# Patient Record
Sex: FEMALE | Race: WHITE | Employment: OTHER | URBAN - METROPOLITAN AREA
[De-identification: names, ages, dates, MRNs, and addresses within clinical notes are randomized per-mention and may not be internally consistent; named-entity substitution may affect disease eponyms.]

---

## 2017-01-20 ENCOUNTER — HOSPITAL ENCOUNTER (OUTPATIENT)
Dept: MAMMOGRAPHY | Age: 67
Discharge: HOME | End: 2017-01-20
Attending: FAMILY MEDICINE
Payer: COMMERCIAL

## 2017-01-20 DIAGNOSIS — Z12.31 VISIT FOR SCREENING MAMMOGRAM: ICD-10-CM

## 2017-01-20 PROCEDURE — 77067 SCR MAMMO BI INCL CAD: CPT

## 2017-04-06 PROBLEM — Z79.4 TYPE 2 DIABETES MELLITUS WITHOUT COMPLICATION, WITH LONG-TERM CURRENT USE OF INSULIN (HCC): Status: ACTIVE | Noted: 2017-04-06

## 2017-04-06 PROBLEM — E11.9 TYPE 2 DIABETES MELLITUS WITHOUT COMPLICATION, WITH LONG-TERM CURRENT USE OF INSULIN (HCC): Status: ACTIVE | Noted: 2017-04-06

## 2018-02-21 ENCOUNTER — HOSPITAL ENCOUNTER (OUTPATIENT)
Dept: MAMMOGRAPHY | Age: 68
Discharge: HOME | End: 2018-02-21
Attending: FAMILY MEDICINE
Payer: COMMERCIAL

## 2018-02-21 DIAGNOSIS — Z12.31 VISIT FOR SCREENING MAMMOGRAM: ICD-10-CM

## 2018-02-21 PROCEDURE — 77067 SCR MAMMO BI INCL CAD: CPT

## 2018-03-12 ENCOUNTER — HOSPITAL ENCOUNTER (OUTPATIENT)
Dept: PHYSICAL THERAPY | Age: 68
Discharge: HOME | End: 2018-03-12
Payer: COMMERCIAL

## 2018-03-12 PROCEDURE — G8991 OTHER PT/OT GOAL STATUS: HCPCS

## 2018-03-12 PROCEDURE — G8990 OTHER PT/OT CURRENT STATUS: HCPCS

## 2018-03-12 PROCEDURE — 97140 MANUAL THERAPY 1/> REGIONS: CPT

## 2018-03-12 PROCEDURE — 97166 OT EVAL MOD COMPLEX 45 MIN: CPT

## 2018-03-12 NOTE — PROGRESS NOTES
Ambulatory/Rehab Services H2 Model Falls Risk Assessment    Risk Factor Pts. ·   Confusion/Disorientation/Impulsivity  []    4 ·   Symptomatic Depression  []   2 ·   Altered Elimination  []   1 ·   Dizziness/Vertigo  []   1 ·   Gender (Male)  []   1 ·   Any administered antiepileptics (anticonvulsants):  []   2 ·   Any administered benzodiazepines:  []   1 ·   Visual Impairment (specify):  []   1 ·   Portable Oxygen Use  []   1 ·   Orthostatic ? BP  []   1 ·   History of Recent Falls (within 3 mos.)  []   5     Ability to Rise from Chair (choose one) Pts. ·   Ability to rise in a single movement  []   0 ·   Pushes up, successful in one attempt  [x]   1 ·   Multiple attempts, but successful  []   3 ·   Unable to rise without assistance  []   4   Total: (5 or greater = High Risk) 1     Falls Prevention Plan:   []                Physical Limitations to Exercise (specify):   []                Mobility Assistance Device (type):   []                Exercise/Equipment Adaptation (specify):    ©2010 [facility] Patent #[id number].  Federal Law prohibits the replication, distribution or use without written permission from [facility] [employer]

## 2018-03-12 NOTE — THERAPY EVALUATION
[facility]  : 1950  Primary: [facility]  Secondary: [address] [facility]  [address], [facility], [address]  [phone]   [phone]         OUTPATIENT OCCUPATIONAL THERAPY: Initial Assessment and Daily Note 3/12/2018    ICD-10: Treatment Diagnosis: I89.0, lymphedema not elsewhere classified  G89.0, pain not elsewhere classified  M62.81, muscle weakness generalized  Precautions/Allergies:   Codeine and Tramadol   Fall Risk Score: 1 (? 5 = High Risk)  MD Orders: eval and treat MEDICAL/REFERRING DIAGNOSIS:   Localized edema [R60.0]   DATE OF ONSET:    REFERRING PHYSICIAN: Ankur Flores MD  RETURN PHYSICIAN APPOINTMENT: to be determined      INITIAL ASSESSMENT:  Ms. Hayley Garcia presents with bilateral lower extremity 3+/5 pitting edema from her knees to her feet. She has history of THR in . She reports her swelling started in  and is not reversible. She has no tissue changes or history of cellulitis. 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 She is wearing 15-23 mmHg compression zipper socks daily at work. She reports tightness, limb heaviness, and pain. Pt is also diabetic with neuropathy in her hands and feet. She lives with her  and two adult children. She is here to manage her leg swelling. PLAN OF CARE:   PROBLEM LIST:  1. Edema/Girth  2. Decreased Knowledge of Precautions  3. Decreased Skin Integrity/Hygeine  4. Decreased [place] with Home Exercise Program INTERVENTIONS PLANNED  1. Skin care  2. Compression bandaging  3. Fitting for compression garment(s)  4. Manual therapy/Manual lymph drainage  5. Therapeutic exercise/Therapeutic activities  6. Patient Education  7. Compression pump trial prn  8.  kinesiotaping    TREATMENT PLAN:  Effective Dates: 3/12/18 TO 18.   Frequency/Duration:  2x a week for 90 days and upon reassessment will adjust frequency and duration as progress indicates. GOALS: (Goals have been discussed and agreed upon with patient.)  Short-Term Functional Goals: Time Frame: 45 days  1. The patient/caregiver will verbalize understanding of lymphedema precautions. 2. Patient will be independent with skin care regimen to decrease risk of cellulitis. 3. The patient/caregiver will be independent at donning and doffing  lower extremity compression bandages. 4. The patient/caregiver will be independent with self-manual lymph drainage techniques and show decrease in limb volume. 5. Patient will be independent in lymphatic exercises. Discharge Goals: Time Frame: 90 days  1. Patient's bilateral lower extremity circumferential measurements will decrease 5 to 10 cm on volumetric graph to maximize functional use in ADL's.    2. The patient/caregiver will be independent with home management of lymphedema. 3. Patient/caregiver will be independent donning and doffing bilateral lower extremity compression garment. Rehabilitation Potential For Stated Goals: Fair  Regarding Shabbir Huang's therapy, I certify that the treatment plan above will be carried out by a therapist or under their direction. Thank you for this referral,  Louis Carl OTR/L     Referring Physician Signature: Susie Scales MD _________________________  Date _________            The information in this section was collected on 3/12/2018   (except where otherwise noted). OCCUPATIONAL PROFILE & HISTORY:   History of Present Injury/Illness (Reason for Referral):    Ms. Ignacio Lucero presents with bilateral lower extremity 3+/5 pitting edema from her knees to her feet. She has history of THR in 2015. She reports her swelling started in 2017 and is not reversible. She has no tissue changes or history of cellulitis. 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   She is wearing 15-23 mmHg compression zipper socks daily at work. She reports tightness, limb heaviness, and pain. Pt is also diabetic with neuropathy in her hands and feet. She lives with her  and two adult children. She is here to manage her leg swelling. Past Medical History/Comorbidities:   Ms. Isidoro Light  has a past medical history of Chronic musculoskeletal pain; Cystocele with rectocele; Essential hypertension (2/9/2016); GERD (gastroesophageal reflux disease); Hip fracture requiring operative repair ([facility] (1/9/2015); Hip fracture, left ([facility] (1/7/2015); Hyperlipidemia; Hypothyroidism; Impaired mobility and ADLs; Pure hypercholesterolemia (2/9/2016); and Type 2 diabetes mellitus without complication ([facility] (1/8/2438). Ms. Isidoro Light  has a past surgical history that includes hx tonsillectomy; hx tubal ligation; hx dilation and curettage; hx colonoscopy; hx magali and bso; and hx hip fracture tx (Left). Social History/Living Environment:        Pt lives with her  and 2 adult children  Prior Level of Function/Work/Activity:  Pt works full time in a school [employer]. She is independent with ADLs/IADLs with difficulty donning and doffing socks and shoes due to s/p THR and limited hip flexion    Current Medications:    Current Outpatient Prescriptions:     TURMERIC, BULK,, by Does Not Apply route., Disp: , Rfl:     HUMALOG KWIKPEN 100 unit/mL kwikpen, INJECT 40  UNITS SUBCUTANEOUSLY AT BEDTIME, Disp: 15 Pen, Rfl: 11    ezetimibe (ZETIA) 10 mg tablet, Take 1 Tab by mouth daily. , Disp: 90 Tab, Rfl: 3    gabapentin (NEURONTIN) 600 mg tablet, TAKE ONE TABLET BY MOUTH THREE TIMES DAILY, Disp: 270 Tab, Rfl: 3    cyclobenzaprine (FLEXERIL) 10 mg tablet, Take 1 Tab by mouth daily. , Disp: 90 Tab, Rfl: 3    traMADol (ULTRAM) 50 mg tablet, TAKE ONE TABLET BY MOUTH TWICE DAILY AS NEEDED FOR 30 DAYS, Disp: 60 Tab, Rfl: 5    BD INSULIN PEN NEEDLE UF SHORT 31 gauge x 5/16\" ndle, USE  TWICE DAILY AS DIRECTED WITH  INSULIN  PENS, Disp: 100 Pen Needle, Rfl: 17    TOUJEO SOLOSTAR 300 unit/mL (1.5 mL) inpn, INJECT 70  UNITS SUBCUTANEOUSLY ONCE DAILY WITH DINNER, Disp: 18 Pen, Rfl: 11    glyBURIDE-metFORMIN (GLUCOVANCE) 5-500 mg per tablet, Take 1 Tab by mouth Daily (before breakfast). Indications: type 2 diabetes mellitus, Disp: 90 Tab, Rfl: 3    atorvastatin (LIPITOR) 80 mg tablet, Take 1 Tab by mouth daily. Indications: hyperlipidemia, Disp: 90 Tab, Rfl: 3    oxybutynin chloride XL (DITROPAN XL) 10 mg CR tablet, Take 1 Tab by mouth daily. , Disp: 30 Tab, Rfl: 11    meloxicam (MOBIC) 15 mg tablet, Take 1 Tab by mouth daily. , Disp: 30 Tab, Rfl: 11    cyanocobalamin 1,000 mcg tablet, Take 1,000 mcg by mouth daily. , Disp: , Rfl:     levothyroxine (SYNTHROID) 100 mcg tablet, TAKE ONE TABLET BY MOUTH ONCE DAILY BEFORE BREAKFAST FOR HYPOTHYROIDISM, Disp: 90 Tab, Rfl: 3    ONETOUCH ULTRA TEST strip, USE TO TEST BLOOD SUGARS THREE TIMES A DAY, Disp: 300 Strip, Rfl: 11    ONETOUCH ULTRA TEST strip, TEST BLOOD SUGAR TWICE A   DAY, Disp: 200 Strip, Rfl: 2    CALCIUM CARB/VIT D3/MINERALS (CALCIUM-VITAMIN D PO), Take  by mouth., Disp: , Rfl:     MULTIVITAMIN PO, Take  by mouth., Disp: , Rfl:             Date Last Reviewed:  3/12/2018     Complexity Level : Expanded review of therapy/medical records (1-2):  MODERATE COMPLEXITY   ASSESSMENT OF OCCUPATIONAL PERFORMANCE:   Palpation:          Pitting 3+/5 lower extremity edema from knees to feet bilaterally  Skin Integrity:          Excessive dryness, otherwise no tissue changes  Sensation:  Neuropathy in feet  Functional Mobility:  Ambulates without assistive device; independent  Activities of Daily Living Mod A shoes and socks due to limited hip flexion; has sock aid and long shoe horn at home  Edema/Girth:  3+ and pitting    Right Left    Initial Most Recent Initial Most Recent   Lower  Extremity 137 cm   139 cm        PRETREATMENT AFFECTED LIMB(s): right lower extremity  left lower extremity      Date:  3/12/18 3/12/18 Right / Left Right Left         Groin   []      []           8 inches   []      []           4 inches   []      []         PoplitealSpace   []      [] 32.0 cm 33.0 cm         8 inches   []      [] 33.0 33.5         4 inches   []      [] 29.5 30.0         Ankle   []      [] 24.0 24.5         Instep   []      [] 18.5 18.0       Measurements are taken in centimeters:  2.54 cm = 1 inch                Physical Skills Involved:  1. Sensation  2. Pain (Chronic)  3. Edema  4. Skin Integrity  5. tightness/heaviness Cognitive Skills Affected (resulting in the inability to perform in a timely and safe manner):  1. N/A Psychosocial Skills Affected:  1. Habits/Routines   Number of elements that affect the Plan of Care: 3-5:  MODERATE COMPLEXITY   CLINICAL DECISION MAKING:   Outcome Measure: Tool Used: Lymphedema Life Impact Scale   Score:  Initial: 14 Most Recent: X (Date: -- )   Interpretation of Score: The Lymphedema Life Impact Scale (LLIS) is a validated instrument that measures the physical, functional, and psychosocial concerns pertinent to patients with extremity lymphedema. The Scale's questionnaire is administered to patients to gauge impairments, activity limitations, and participation restrictions resulting from their lymphedema. Score 0 1-13 14-26 27-40 41-54 55-67 68   Modifier CH CI CJ CK CL CM CN     ? Other PT/OT Primary Functional Limitations:     - CURRENT STATUS: CJ - 20%-39% impaired, limited or restricted    - GOAL STATUS: CI - 1%-19% impaired, limited or restricted    - D/C STATUS:  ---------------To be determined---------------       Medical Necessity:   · Patient is expected to demonstrate progress in reduction of edema to reduce risk for cellulitis. Reason for Services/Other Comments:  · Patient continues to require skilled intervention due to bilateral lower extremity lymphedema.      Use of outcome tool(s) and clinical judgement create a POC that gives a: Questionable prediction of patient's progress: MODERATE COMPLEXITY   TREATMENT:   (In addition to Assessment/Re-Assessment sessions the following treatments were rendered)    Pre-treatment Symptoms/Complaints:  bilateral lower extremity 3+/5 pitting edema from her knees to her feet  Pain: Initial:   Pain Intensity 1: 2  Post Session:  2   Occupational Therapy Treatments:    OT eval(X  ) OT eval was completed. Pt received information on lymphedema and risk reduction/self management practices as outlined by the National Lymphedema Network. Therapeutic Exercise (minutes):     HEP:  As above; handouts given to patient for all exercises. Manual Therapy:  30 minutes    Pt was educated on lymphatic pathophysiology, complete decongestive therapy, precautions and skin integrity management. Manual Lymph Drainage:( minutes)           Lymph Nodes:    Cervical Supraclavicular Axillary Abdominal Inguinal Popliteal Antecubital   RIGHT []     []     []     []     []     []     []       LEFT []     []     []     []     []     []     []          Anastamoses:   Axillo-axillary Inguino-inguinal Axillo-inguinal Inguino-axillary   ANTERIOR []     []     []     []       POSTERIOR []     []     []     []       RIGHT []     []     []     []       LEFT []     []     []     []         Limbs:   []    RUE     []    LUE     []    RLE    []    LLE   Compression: ( minutes): Applied surgigrip stockinette for light compression. Pt will begin MLD and compression bandaging next treatment session    Treatment/Session Assessment:    · Response to Treatment:  Pt in agreement with POC and goals. · Compliance with Program/Exercises: Will assess as treatment progresses. · Recommendations/Intent for next treatment session: \"Next visit will focus on complete decongestive therapy\".   Total Treatment Duration:  OT Patient Time In/Time Out  Time In: 0400  Time Out: [address], OT

## 2018-03-26 ENCOUNTER — HOSPITAL ENCOUNTER (OUTPATIENT)
Dept: PHYSICAL THERAPY | Age: 68
Discharge: HOME | End: 2018-03-26
Payer: COMMERCIAL

## 2018-03-26 PROCEDURE — 97140 MANUAL THERAPY 1/> REGIONS: CPT

## 2018-03-26 NOTE — THERAPY EVALUATION
Alley Nunez  : 1950  Primary: [employer]  Secondary: [address] at [facility] Therapy  [address], [facility], [address]  [phone]   [phone]         OUTPATIENT OCCUPATIONAL THERAPY: Daily Note 3/26/2018    ICD-10: Treatment Diagnosis: I89.0, lymphedema not elsewhere classified  G89.0, pain not elsewhere classified  M62.81, muscle weakness generalized  Precautions/Allergies:   Codeine and Tramadol   Fall Risk Score: 1 (? 5 = High Risk)  MD Orders: eval and treat MEDICAL/REFERRING DIAGNOSIS:   Localized edema [R60.0]   DATE OF ONSET:    REFERRING PHYSICIAN: Jono Harper MD  RETURN PHYSICIAN APPOINTMENT: to be determined      INITIAL ASSESSMENT:  Ms. John Marley presents with bilateral lower extremity 3+/5 pitting edema from her knees to her feet. She has history of THR in . She reports her swelling started in  and is not reversible. She has no tissue changes or history of cellulitis. 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 She is wearing 15-23 mmHg compression zipper socks daily at work. She reports tightness, limb heaviness, and pain. Pt is also diabetic with neuropathy in her hands and feet. She lives with her  and two adult children. She is here to manage her leg swelling. PLAN OF CARE:   PROBLEM LIST:  1. Edema/Girth  2. Decreased Knowledge of Precautions  3. Decreased Skin Integrity/Hygeine  4. Decreased [place] with Home Exercise Program INTERVENTIONS PLANNED  1. Skin care  2. Compression bandaging  3. Fitting for compression garment(s)  4. Manual therapy/Manual lymph drainage  5. Therapeutic exercise/Therapeutic activities  6. Patient Education  7. Compression pump trial prn  8.  kinesiotaping    TREATMENT PLAN:  Effective Dates: 3/12/18 TO 18.   Frequency/Duration:  2x a week for 90 days and upon reassessment will adjust frequency and duration as progress indicates. GOALS: (Goals have been discussed and agreed upon with patient.)  Short-Term Functional Goals: Time Frame: 45 days  1. The patient/caregiver will verbalize understanding of lymphedema precautions. 2. Patient will be independent with skin care regimen to decrease risk of cellulitis. 3. The patient/caregiver will be independent at donning and doffing  lower extremity compression bandages. 4. The patient/caregiver will be independent with self-manual lymph drainage techniques and show decrease in limb volume. 5. Patient will be independent in lymphatic exercises. Discharge Goals: Time Frame: 90 days  1. Patient's bilateral lower extremity circumferential measurements will decrease 5 to 10 cm on volumetric graph to maximize functional use in ADL's.    2. The patient/caregiver will be independent with home management of lymphedema. 3. Patient/caregiver will be independent donning and doffing bilateral lower extremity compression garment. Rehabilitation Potential For Stated Goals: Fair  Regarding Michael Huang's therapy, I certify that the treatment plan above will be carried out by a therapist or under their direction. Thank you for this referral,  Bijan Blanchard OTR/L     Referring Physician Signature: Anahi Delgado MD _________________________  Date _________            The information in this section was collected on 3/26/2018   (except where otherwise noted). OCCUPATIONAL PROFILE & HISTORY:   History of Present Injury/Illness (Reason for Referral):    Ms. Chula Trejo presents with bilateral lower extremity 3+/5 pitting edema from her knees to her feet. She has history of THR in 2015. She reports her swelling started in 2017 and is not reversible. She has no tissue changes or history of cellulitis. 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 She is wearing 15-23 mmHg compression zipper socks daily at work. She reports tightness, limb heaviness, and pain. Pt is also diabetic with neuropathy in her hands and feet. She lives with her  and two adult children. She is here to manage her leg swelling. Past Medical History/Comorbidities:   Ms. Chula Trejo  has a past medical history of Chronic musculoskeletal pain; Cystocele with rectocele; Essential hypertension (2/9/2016); GERD (gastroesophageal reflux disease); Hip fracture requiring operative repair ([facility] (1/9/2015); [facility] fracture[facility] ([facility] (1/7/2015); Hyperlipidemia; Hypothyroidism; Impaired mobility and ADLs; Pure hypercholesterolemia (2/9/2016); and Type 2 diabetes mellitus without complication ([facility] (4/2/4547). Ms. Chula Trejo  has a past surgical history that includes hx tonsillectomy; hx tubal ligation; hx dilation and curettage; hx colonoscopy; hx magali and bso; and hx hip fracture tx (Left). Social History/Living Environment:        Pt lives with her  and 2 adult children  Prior Level of Function/Work/Activity:  Pt works full time in a school [employer]. She is independent with ADLs/IADLs with difficulty donning and doffing socks and shoes due to s/p THR and limited hip flexion    Current Medications:    Current Outpatient Prescriptions:     TURMERIC, BULK,, by Does Not Apply route., Disp: , Rfl:     HUMALOG KWIKPEN 100 unit/mL kwikpen, INJECT 40  UNITS SUBCUTANEOUSLY AT BEDTIME, Disp: 15 Pen, Rfl: 11    ezetimibe (ZETIA) 10 mg tablet, Take 1 Tab by mouth daily. , Disp: 90 Tab, Rfl: 3    gabapentin (NEURONTIN) 600 mg tablet, TAKE ONE TABLET BY MOUTH THREE TIMES DAILY, Disp: 270 Tab, Rfl: 3    cyclobenzaprine (FLEXERIL) 10 mg tablet, Take 1 Tab by mouth daily. , Disp: 90 Tab, Rfl: 3    traMADol (ULTRAM) 50 mg tablet, TAKE ONE TABLET BY MOUTH TWICE DAILY AS NEEDED FOR 30 DAYS, Disp: 60 Tab, Rfl: 5    BD INSULIN PEN NEEDLE UF SHORT 31 gauge x 5/16\" ndle, USE  TWICE DAILY AS DIRECTED WITH  INSULIN  PENS, Disp: 100 Pen Needle, Rfl: 17    TOUJEO SOLOSTAR 300 unit/mL (1.5 mL) inpn, INJECT 70  UNITS SUBCUTANEOUSLY ONCE DAILY WITH DINNER, Disp: 18 Pen, Rfl: 11    glyBURIDE-metFORMIN (GLUCOVANCE) 5-500 mg per tablet, Take 1 Tab by mouth Daily (before breakfast). Indications: type 2 diabetes mellitus, Disp: 90 Tab, Rfl: 3    atorvastatin (LIPITOR) 80 mg tablet, Take 1 Tab by mouth daily. Indications: hyperlipidemia, Disp: 90 Tab, Rfl: 3    oxybutynin chloride XL (DITROPAN XL) 10 mg CR tablet, Take 1 Tab by mouth daily. , Disp: 30 Tab, Rfl: 11    meloxicam (MOBIC) 15 mg tablet, Take 1 Tab by mouth daily. , Disp: 30 Tab, Rfl: 11    cyanocobalamin 1,000 mcg tablet, Take 1,000 mcg by mouth daily. , Disp: , Rfl:     levothyroxine (SYNTHROID) 100 mcg tablet, TAKE ONE TABLET BY MOUTH ONCE DAILY BEFORE BREAKFAST FOR HYPOTHYROIDISM, Disp: 90 Tab, Rfl: 3    ONETOUCH ULTRA TEST strip, USE TO TEST BLOOD SUGARS THREE TIMES A DAY, Disp: 300 Strip, Rfl: 11    ONETOUCH ULTRA TEST strip, TEST BLOOD SUGAR TWICE A   DAY, Disp: 200 Strip, Rfl: 2    CALCIUM CARB/VIT D3/MINERALS (CALCIUM-VITAMIN D PO), Take  by mouth., Disp: , Rfl:     MULTIVITAMIN PO, Take  by mouth., Disp: , Rfl:             Date Last Reviewed:  3/26/2018     Complexity Level : Expanded review of therapy/medical records (1-2):  MODERATE COMPLEXITY   ASSESSMENT OF OCCUPATIONAL PERFORMANCE:   Palpation:          Pitting 3+/5 lower extremity edema from knees to feet bilaterally  Skin Integrity:          Excessive dryness, otherwise no tissue changes  Sensation:  Neuropathy in feet  Functional Mobility:  Ambulates without assistive device; independent  Activities of Daily Living Mod A shoes and socks due to limited hip flexion; has sock aid and long shoe horn at home  Edema/Girth:  3+ and pitting    Right Left    Initial Most Recent Initial Most Recent   Lower  Extremity 137 cm   139 cm        PRETREATMENT AFFECTED LIMB(s): right lower extremity  left lower extremity      Date:  3/12/18 3/12/18        Right / Left Right Left         Groin   []      []           8 inches   []      []           4 inches   []      []         PoplitealSpace   []      [] 32.0 cm 33.0 cm         8 inches   []      [] 33.0 33.5         4 inches   []      [] 29.5 30.0         Ankle   []      [] 24.0 24.5         Instep   []      [] 18.5 18.0       Measurements are taken in centimeters:  2.54 cm = 1 inch                Physical Skills Involved:  1. Sensation  2. Pain (Chronic)  3. Edema  4. Skin Integrity  5. tightness/heaviness Cognitive Skills Affected (resulting in the inability to perform in a timely and safe manner):  1. N/A Psychosocial Skills Affected:  1. Habits/Routines   Number of elements that affect the Plan of Care: 3-5:  MODERATE COMPLEXITY   CLINICAL DECISION MAKING:   Outcome Measure: Tool Used: Lymphedema Life Impact Scale   Score:  Initial: 14 Most Recent: X (Date: -- )   Interpretation of Score: The Lymphedema Life Impact Scale (LLIS) is a validated instrument that measures the physical, functional, and psychosocial concerns pertinent to patients with extremity lymphedema. The Scale's questionnaire is administered to patients to gauge impairments, activity limitations, and participation restrictions resulting from their lymphedema. Score 0 1-13 14-26 27-40 41-54 55-67 68   Modifier CH CI CJ CK CL CM CN     ? Other PT/OT Primary Functional Limitations:     - CURRENT STATUS: CJ - 20%-39% impaired, limited or restricted    - GOAL STATUS: CI - 1%-19% impaired, limited or restricted    - D/C STATUS:  ---------------To be determined---------------       Medical Necessity:   · Patient is expected to demonstrate progress in reduction of edema to reduce risk for cellulitis. Reason for Services/Other Comments:  · Patient continues to require skilled intervention due to bilateral lower extremity lymphedema.      Use of outcome tool(s) and clinical judgement create a POC that gives a: Questionable prediction of patient's progress: MODERATE COMPLEXITY   TREATMENT:   (In addition to Assessment/Re-Assessment sessions the following treatments were rendered)  3/26/2018      Pre-treatment Symptoms/Complaints:  bilateral lower extremity 3+/5 pitting edema from her knees to her feet  Pain: Initial:   Pain Intensity 1: 2  Post Session:  2   Occupational Therapy Treatments:    OT eval(X  ) OT eval was completed. Pt received information on lymphedema and risk reduction/self management practices as outlined by the National Lymphedema Network. Therapeutic Exercise (minutes):     HEP:  As above; handouts given to patient for all exercises. Manual Therapy:  45 minutes   MLD followed by skin care and compression bandaging L LE          Manual Lymph Drainage:( 35 minutes)           Lymph Nodes:    Cervical Supraclavicular Axillary Abdominal Inguinal Popliteal Antecubital   RIGHT [x]     [x]     [x]     []     [x]     [x]     []       LEFT [x]     [x]     [x]     []     [x]     [x]     []          Anastamoses:   Axillo-axillary Inguino-inguinal Axillo-inguinal Inguino-axillary   ANTERIOR []     []     []     [x]       POSTERIOR []     []     []     []       RIGHT []     []     []     [x]       LEFT []     []     []     [x]         Limbs:   []    RUE     []    LUE     [x]    RLE    [x]    LLE   Compression: ( minutes): Applied Eucerin lotion B LEs. Multilayer compression bandaging L LE using surgigrip and 2 short stretch bandages. Surgigrip R LE. Pt is working in  full time and is unable to tolerate bandaging both legs consecutively. She had good results with surgigrip light compression over the weekend. Will request prescription for Easter Ink as this will enable her to have short stretch compression bilaterally and work. Treatment/Session Assessment:    · Response to Treatment:  Pt in agreement with POC and goals. · Compliance with Program/Exercises: Will assess as treatment progresses.   · Recommendations/Intent for next treatment session: \"Next visit will focus on complete decongestive therapy\".   Total Treatment Duration:  45 minutes  OT Patient Time In/Time Out  Time In: 0400  Time Out: 550 Uriel Kimble, OT

## 2018-04-02 ENCOUNTER — HOSPITAL ENCOUNTER (OUTPATIENT)
Dept: PHYSICAL THERAPY | Age: 68
Discharge: HOME | End: 2018-04-02
Payer: COMMERCIAL

## 2018-04-02 PROCEDURE — 97140 MANUAL THERAPY 1/> REGIONS: CPT

## 2018-04-02 NOTE — THERAPY EVALUATION
Vasquez Riosus  : 1950  Primary: [facility]  Secondary: [address] [place] at [facility] Therapy  [address], [facility], [address]  [phone]   [phone]         OUTPATIENT OCCUPATIONAL THERAPY: Daily Note 2018    ICD-10: Treatment Diagnosis: I89.0, lymphedema not elsewhere classified  G89.0, pain not elsewhere classified  M62.81, muscle weakness generalized  Precautions/Allergies:   Codeine and Tramadol   Fall Risk Score: 1 (? 5 = High Risk)  MD Orders: eval and treat MEDICAL/REFERRING DIAGNOSIS:   Localized edema [R60.0]   DATE OF ONSET:    REFERRING PHYSICIAN: Irena Keith MD  RETURN PHYSICIAN APPOINTMENT: to be determined      INITIAL ASSESSMENT:  Ms. Dionicio Piper presents with bilateral lower extremity 3+/5 pitting edema from her knees to her feet. She has history of THR in . She reports her swelling started in  and is not reversible. She has no tissue changes or history of cellulitis. 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 She is wearing 15-23 mmHg compression zipper socks daily at work. She reports tightness, limb heaviness, and pain. Pt is also diabetic with neuropathy in her hands and feet. She lives with her  and two adult children. She is here to manage her leg swelling. PLAN OF CARE:   PROBLEM LIST:  1. Edema/Girth  2. Decreased Knowledge of Precautions  3. Decreased Skin Integrity/Hygeine  4. Decreased [place] with Home Exercise Program INTERVENTIONS PLANNED  1. Skin care  2. Compression bandaging  3. Fitting for compression garment(s)  4. Manual therapy/Manual lymph drainage  5. Therapeutic exercise/Therapeutic activities  6. Patient Education  7. Compression pump trial prn  8.  kinesiotaping    TREATMENT PLAN:  Effective Dates: 3/12/18 TO 18.   Frequency/Duration:  2x a week for 90 days and upon reassessment will adjust frequency and duration as progress indicates. GOALS: (Goals have been discussed and agreed upon with patient.)  Short-Term Functional Goals: Time Frame: 45 days  1. The patient/caregiver will verbalize understanding of lymphedema precautions. 2. Patient will be independent with skin care regimen to decrease risk of cellulitis. 3. The patient/caregiver will be independent at donning and doffing  lower extremity compression bandages. 4. The patient/caregiver will be independent with self-manual lymph drainage techniques and show decrease in limb volume. 5. Patient will be independent in lymphatic exercises. Discharge Goals: Time Frame: 90 days  1. Patient's bilateral lower extremity circumferential measurements will decrease 5 to 10 cm on volumetric graph to maximize functional use in ADL's.    2. The patient/caregiver will be independent with home management of lymphedema. 3. Patient/caregiver will be independent donning and doffing bilateral lower extremity compression garment. Rehabilitation Potential For Stated Goals: Fair  Regarding Eduardo Huang's therapy, I certify that the treatment plan above will be carried out by a therapist or under their direction. Thank you for this referral,  Mora Hart OTR/L     Referring Physician Signature: Khloe Miner MD _________________________  Date _________            The information in this section was collected on 4/2/2018   (except where otherwise noted). OCCUPATIONAL PROFILE & HISTORY:   History of Present Injury/Illness (Reason for Referral):    Ms. Diamante Fuentes presents with bilateral lower extremity 3+/5 pitting edema from her knees to her feet. She has history of THR in 2015. She reports her swelling started in 2017 and is not reversible. She has no tissue changes or history of cellulitis. 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 She is wearing 15-23 mmHg compression zipper socks daily at work.   She reports tightness, limb heaviness, and pain. Pt is also diabetic with neuropathy in her hands and feet. She lives with her  and two adult children. She is here to manage her leg swelling. Past Medical History/Comorbidities:   Ms. Fidel Oleary  has a past medical history of Chronic musculoskeletal pain; Cystocele with rectocele; Essential hypertension (2/9/2016); GERD (gastroesophageal reflux disease); Hip fracture requiring operative repair ([facility] (1/9/2015); [facility] (1/7/2015); Hyperlipidemia; Hypothyroidism; Impaired mobility and ADLs; Pure hypercholesterolemia (2/9/2016); and Type 2 diabetes mellitus without complication ([facility] (7/0/3391). Ms. Fidel Oleary  has a past surgical history that includes hx tonsillectomy; hx tubal ligation; hx dilation and curettage; hx colonoscopy; hx magali and bso; and hx hip fracture tx (Left). Social History/Living Environment:        Pt lives with her  and 2 adult children  Prior Level of Function/Work/Activity:  Pt works full time in a school [employer]. She is independent with ADLs/IADLs with difficulty donning and doffing socks and shoes due to s/p THR and limited hip flexion    Current Medications:    Current Outpatient Prescriptions:     TURMERIC, BULK,, by Does Not Apply route., Disp: , Rfl:     HUMALOG KWIKPEN 100 unit/mL kwikpen, INJECT 40  UNITS SUBCUTANEOUSLY AT BEDTIME, Disp: 15 Pen, Rfl: 11    ezetimibe (ZETIA) 10 mg tablet, Take 1 Tab by mouth daily. , Disp: 90 Tab, Rfl: 3    gabapentin (NEURONTIN) 600 mg tablet, TAKE ONE TABLET BY MOUTH THREE TIMES DAILY, Disp: 270 Tab, Rfl: 3    cyclobenzaprine (FLEXERIL) 10 mg tablet, Take 1 Tab by mouth daily. , Disp: 90 Tab, Rfl: 3    traMADol (ULTRAM) 50 mg tablet, TAKE ONE TABLET BY MOUTH TWICE DAILY AS NEEDED FOR 30 DAYS, Disp: 60 Tab, Rfl: 5    BD INSULIN PEN NEEDLE UF SHORT 31 gauge x 5/16\" ndle, USE  TWICE DAILY AS DIRECTED WITH  INSULIN  PENS, Disp: 100 Pen Needle, Rfl: [facility]. 300 unit/mL (1.5 mL) inpn, INJECT 70  UNITS SUBCUTANEOUSLY ONCE DAILY WITH DINNER, Disp: 18 Pen, Rfl: 11    glyBURIDE-metFORMIN (GLUCOVANCE) 5-500 mg per tablet, Take 1 Tab by mouth Daily (before breakfast). Indications: type 2 diabetes mellitus, Disp: 90 Tab, Rfl: 3    atorvastatin (LIPITOR) 80 mg tablet, Take 1 Tab by mouth daily. Indications: hyperlipidemia, Disp: 90 Tab, Rfl: 3    oxybutynin chloride XL (DITROPAN XL) 10 mg CR tablet, Take 1 Tab by mouth daily. , Disp: 30 Tab, Rfl: 11    meloxicam (MOBIC) 15 mg tablet, Take 1 Tab by mouth daily. , Disp: 30 Tab, Rfl: 11    cyanocobalamin 1,000 mcg tablet, Take 1,000 mcg by mouth daily. , Disp: , Rfl:     levothyroxine (SYNTHROID) 100 mcg tablet, TAKE ONE TABLET BY MOUTH ONCE DAILY BEFORE BREAKFAST FOR HYPOTHYROIDISM, Disp: 90 Tab, Rfl: 3    ONETOUCH ULTRA TEST strip, USE TO TEST BLOOD SUGARS THREE TIMES A DAY, Disp: 300 Strip, Rfl: 11    ONETOUCH ULTRA TEST strip, TEST BLOOD SUGAR TWICE A   DAY, Disp: 200 Strip, Rfl: 2    CALCIUM CARB/VIT D3/MINERALS (CALCIUM-VITAMIN D PO), Take  by mouth., Disp: , Rfl:     MULTIVITAMIN PO, Take  by mouth., Disp: , Rfl:             Date Last Reviewed:  4/2/2018     Complexity Level : Expanded review of therapy/medical records (1-2):  MODERATE COMPLEXITY   ASSESSMENT OF OCCUPATIONAL PERFORMANCE:   Palpation:          Pitting 3+/5 lower extremity edema from knees to feet bilaterally  Skin Integrity:          Excessive dryness, otherwise no tissue changes  Sensation:  Neuropathy in feet  Functional Mobility:  Ambulates without assistive device; independent  Activities of Daily Living Mod A shoes and socks due to limited hip flexion; has sock aid and long shoe horn at home  Edema/Girth:  3+ and pitting    Right Left    Initial Most Recent Initial Most Recent   Lower  Extremity 137 cm   139 cm        PRETREATMENT AFFECTED LIMB(s): right lower extremity  left lower extremity      Date:  3/12/18 3/12/18        Right / Left Right Left         Groin   []      []           8 inches   []      []           4 inches   []      []         PoplitealSpace   []      [] 32.0 cm 33.0 cm         8 inches   []      [] 33.0 33.5         4 inches   []      [] 29.5 30.0         Ankle   []      [] 24.0 24.5         Instep   []      [] 18.5 18.0       Measurements are taken in centimeters:  2.54 cm = 1 inch                Physical Skills Involved:  1. Sensation  2. Pain (Chronic)  3. Edema  4. Skin Integrity  5. tightness/heaviness Cognitive Skills Affected (resulting in the inability to perform in a timely and safe manner):  1. N/A Psychosocial Skills Affected:  1. Habits/Routines   Number of elements that affect the Plan of Care: 3-5:  MODERATE COMPLEXITY   CLINICAL DECISION MAKING:   Outcome Measure: Tool Used: Lymphedema Life Impact Scale   Score:  Initial: 14 Most Recent: X (Date: -- )   Interpretation of Score: The Lymphedema Life Impact Scale (LLIS) is a validated instrument that measures the physical, functional, and psychosocial concerns pertinent to patients with extremity lymphedema. The Scale's questionnaire is administered to patients to gauge impairments, activity limitations, and participation restrictions resulting from their lymphedema. Score 0 1-13 14-26 27-40 41-54 55-67 68   Modifier CH CI CJ CK CL CM CN     ? Other PT/OT Primary Functional Limitations:     - CURRENT STATUS: CJ - 20%-39% impaired, limited or restricted    - GOAL STATUS: CI - 1%-19% impaired, limited or restricted    - D/C STATUS:  ---------------To be determined---------------       Medical Necessity:   · Patient is expected to demonstrate progress in reduction of edema to reduce risk for cellulitis. Reason for Services/Other Comments:  · Patient continues to require skilled intervention due to bilateral lower extremity lymphedema.      Use of outcome tool(s) and clinical judgement create a POC that gives a: Questionable prediction of patient's progress: MODERATE COMPLEXITY   TREATMENT:   (In addition to Assessment/Re-Assessment sessions the following treatments were rendered)  4/2/2018      Pre-treatment Symptoms/Complaints:  Pt with decrease of 13.5cm  R LE ans 9 cm L LE circumferencial volumetric measurements. Requested prescription for class 1 compression hose. Pain: Initial:   Pain Intensity 1: 2  Post Session:  2   Occupational Therapy Treatments:    OT eval(X  ) OT eval was completed. Pt received information on lymphedema and risk reduction/self management practices as outlined by the National Lymphedema Network. Therapeutic Exercise (minutes):     HEP:  As above; handouts given to patient for all exercises. Manual Therapy:  45 minutes   MLD followed by skin care and 20 minute compression pump trial.          Manual Lymph Drainage:( 40 minutes)           Lymph Nodes:    Cervical Supraclavicular Axillary Abdominal Inguinal Popliteal Antecubital   RIGHT [x]     [x]     [x]     []     [x]     [x]     []       LEFT [x]     [x]     [x]     []     [x]     [x]     []          Anastamoses:   Axillo-axillary Inguino-inguinal Axillo-inguinal Inguino-axillary   ANTERIOR []     []     []     [x]       POSTERIOR []     []     []     []       RIGHT []     []     []     [x]       LEFT []     []     []     [x]         Limbs:   []    RUE     []    LUE     [x]    RLE    [x]    LLE   Compression: (5  Minutes):  Surgrip applied. Pt deferred bandaging. Measured for compression hose and recommend Mediven 20-30 mm Hg, size 3, open toe natural standard length. Waiting for MD prescription. Treatment/Session Assessment:    · Response to Treatment:  Pt in agreement with POC and goals. · Compliance with Program/Exercises: Will assess as treatment progresses. · Recommendations/Intent for next treatment session: \"Next visit will focus on complete decongestive therapy\".   Total Treatment Duration:  45 minutes  OT Patient Time In/Time Out  Time In: 1100  Time Out: Jenn [address]

## 2018-04-05 ENCOUNTER — HOSPITAL ENCOUNTER (OUTPATIENT)
Dept: PHYSICAL THERAPY | Age: 68
Discharge: HOME | End: 2018-04-05
Payer: COMMERCIAL

## 2018-04-05 PROCEDURE — 97140 MANUAL THERAPY 1/> REGIONS: CPT

## 2018-04-09 ENCOUNTER — HOSPITAL ENCOUNTER (OUTPATIENT)
Dept: PHYSICAL THERAPY | Age: 68
Discharge: HOME | End: 2018-04-09
Payer: COMMERCIAL

## 2018-04-09 PROCEDURE — 97140 MANUAL THERAPY 1/> REGIONS: CPT

## 2018-04-09 NOTE — THERAPY EVALUATION
Blanca Hameed  : 1950  Primary: [employer]  Secondary: [address] at [facility]  [address], [facility], [address]  [phone]   [phone]         OUTPATIENT OCCUPATIONAL THERAPY: Daily Note 2018    ICD-10: Treatment Diagnosis: I89.0, lymphedema not elsewhere classified  G89.0, pain not elsewhere classified  M62.81, muscle weakness generalized  Precautions/Allergies:   Codeine and Tramadol   Fall Risk Score: 1 (? 5 = High Risk)  MD Orders: eval and treat MEDICAL/REFERRING DIAGNOSIS:   Localized edema [R60.0]   DATE OF ONSET:    REFERRING PHYSICIAN: Cameron Palm MD  RETURN PHYSICIAN APPOINTMENT: to be determined      INITIAL ASSESSMENT:  Ms. Fidel Oleary presents with bilateral lower extremity 3+/5 pitting edema from her knees to her feet. She has history of THR in . She reports her swelling started in  and is not reversible. She has no tissue changes or history of cellulitis. 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 She is wearing 15-23 mmHg compression zipper socks daily at work. She reports tightness, limb heaviness, and pain. Pt is also diabetic with neuropathy in her hands and feet. She lives with her  and two adult children. She is here to manage her leg swelling. PLAN OF CARE:   PROBLEM LIST:  1. Edema/Girth  2. Decreased Knowledge of Precautions  3. Decreased Skin Integrity/Hygeine  4. Decreased [place] with Home Exercise Program INTERVENTIONS PLANNED  1. Skin care  2. Compression bandaging  3. Fitting for compression garment(s)  4. Manual therapy/Manual lymph drainage  5. Therapeutic exercise/Therapeutic activities  6. Patient Education  7. Compression pump trial prn  8.  kinesiotaping    TREATMENT PLAN:  Effective Dates: 3/12/18 TO 18.   Frequency/Duration:  2x a week for 90 days and upon reassessment will adjust frequency and duration as progress indicates. GOALS: (Goals have been discussed and agreed upon with patient.)  Short-Term Functional Goals: Time Frame: 45 days  1. The patient/caregiver will verbalize understanding of lymphedema precautions. 2. Patient will be independent with skin care regimen to decrease risk of cellulitis. 3. The patient/caregiver will be independent at donning and doffing  lower extremity compression bandages. 4. The patient/caregiver will be independent with self-manual lymph drainage techniques and show decrease in limb volume. 5. Patient will be independent in lymphatic exercises. Discharge Goals: Time Frame: 90 days  1. Patient's bilateral lower extremity circumferential measurements will decrease 5 to 10 cm on volumetric graph to maximize functional use in ADL's.    2. The patient/caregiver will be independent with home management of lymphedema. 3. Patient/caregiver will be independent donning and doffing bilateral lower extremity compression garment. Rehabilitation Potential For Stated Goals: Fair  Regarding Marisol Huang's therapy, I certify that the treatment plan above will be carried out by a therapist or under their direction. Thank you for this referral,  Nina Valadez OTR/L     Referring Physician Signature: Afshan Benavidez MD _________________________  Date _________            The information in this section was collected on 4/9/2018   (except where otherwise noted). OCCUPATIONAL PROFILE & HISTORY:   History of Present Injury/Illness (Reason for Referral):    Ms. Lindajo Olszewski presents with bilateral lower extremity 3+/5 pitting edema from her knees to her feet. She has history of THR in 2015. She reports her swelling started in 2017 and is not reversible. She has no tissue changes or history of cellulitis. Ms. 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 She is wearing 15-23 mmHg compression zipper socks daily at work.   She reports tightness, limb heaviness, and pain. Pt is also diabetic with neuropathy in her hands and feet. She lives with her  and two adult children. She is here to manage her leg swelling. Past Medical History/Comorbidities:   Ms. Gladys Strauss  has a past medical history of Chronic musculoskeletal pain; Cystocele with rectocele; Essential hypertension (2/9/2016); GERD (gastroesophageal reflux disease); Hip fracture requiring operative repair ([facility] (1/9/2015); Hip fracture, left ([facility] (1/7/2015); Hyperlipidemia; Hypothyroidism; Impaired mobility and ADLs; Pure hypercholesterolemia (2/9/2016); and Type 2 diabetes mellitus without complication ([facility] (6/7/2290). Ms. Gladys Strauss  has a past surgical history that includes hx tonsillectomy; hx tubal ligation; hx dilation and curettage; hx colonoscopy; hx magali and bso; and hx hip fracture tx (Left). Social History/Living Environment:        Pt lives with her  and 2 adult children  Prior Level of Function/Work/Activity:  Pt works full time in a school [employer]. She is independent with ADLs/IADLs with difficulty donning and doffing socks and shoes due to s/p THR and limited hip flexion    Current Medications:    Current Outpatient Prescriptions:     TURMERIC, BULK,, by Does Not Apply route., Disp: , Rfl:     HUMALOG KWIKPEN 100 unit/mL kwikpen, INJECT 40  UNITS SUBCUTANEOUSLY AT BEDTIME, Disp: 15 Pen, Rfl: 11    ezetimibe (ZETIA) 10 mg tablet, Take 1 Tab by mouth daily. , Disp: 90 Tab, Rfl: 3    gabapentin (NEURONTIN) 600 mg tablet, TAKE ONE TABLET BY MOUTH THREE TIMES DAILY, Disp: 270 Tab, Rfl: 3    cyclobenzaprine (FLEXERIL) 10 mg tablet, Take 1 Tab by mouth daily. , Disp: 90 Tab, Rfl: 3    traMADol (ULTRAM) 50 mg tablet, TAKE ONE TABLET BY MOUTH TWICE DAILY AS NEEDED FOR 30 DAYS, Disp: 60 Tab, Rfl: 5    BD INSULIN PEN NEEDLE UF SHORT 31 gauge x 5/16\" ndle, USE  TWICE DAILY AS DIRECTED WITH  INSULIN  PENS, Disp: 100 Pen Needle, Rfl: [facility] 300 unit/mL (1.5 mL) inpn, INJECT 70  UNITS SUBCUTANEOUSLY ONCE DAILY WITH DINNER, Disp: 18 Pen, Rfl: 11    glyBURIDE-metFORMIN (GLUCOVANCE) 5-500 mg per tablet, Take 1 Tab by mouth Daily (before breakfast). Indications: type 2 diabetes mellitus, Disp: 90 Tab, Rfl: 3    atorvastatin (LIPITOR) 80 mg tablet, Take 1 Tab by mouth daily. Indications: hyperlipidemia, Disp: 90 Tab, Rfl: 3    oxybutynin chloride XL (DITROPAN XL) 10 mg CR tablet, Take 1 Tab by mouth daily. , Disp: 30 Tab, Rfl: 11    meloxicam (MOBIC) 15 mg tablet, Take 1 Tab by mouth daily. , Disp: 30 Tab, Rfl: 11    cyanocobalamin 1,000 mcg tablet, Take 1,000 mcg by mouth daily. , Disp: , Rfl:     levothyroxine (SYNTHROID) 100 mcg tablet, TAKE ONE TABLET BY MOUTH ONCE DAILY BEFORE BREAKFAST FOR HYPOTHYROIDISM, Disp: 90 Tab, Rfl: 3    ONETOUCH ULTRA TEST strip, USE TO TEST BLOOD SUGARS THREE TIMES A DAY, Disp: 300 Strip, Rfl: 11    ONETOUCH ULTRA TEST strip, TEST BLOOD SUGAR TWICE A   DAY, Disp: 200 Strip, Rfl: 2    CALCIUM CARB/VIT D3/MINERALS (CALCIUM-VITAMIN D PO), Take  by mouth., Disp: , Rfl:     MULTIVITAMIN PO, Take  by mouth., Disp: , Rfl:             Date Last Reviewed:  4/9/2018     Complexity Level : Expanded review of therapy/medical records (1-2):  MODERATE COMPLEXITY   ASSESSMENT OF OCCUPATIONAL PERFORMANCE:   Palpation:          Pitting 3+/5 lower extremity edema from knees to feet bilaterally  Skin Integrity:          Excessive dryness, otherwise no tissue changes  Sensation:  Neuropathy in feet  Functional Mobility:  Ambulates without assistive device; independent  Activities of Daily Living Mod A shoes and socks due to limited hip flexion; has sock aid and long shoe horn at home  Edema/Girth:  3+ and pitting    Right Left    Initial Most Recent Initial Most Recent   Lower  Extremity 137 cm   139 cm        PRETREATMENT AFFECTED LIMB(s): right lower extremity  left lower extremity      Date:  3/12/18 3/12/18        Right / Left Right Left         Groin   []      []           8 inches   []      []           4 inches   []      []         PoplitealSpace   []      [] 32.0 cm 33.0 cm         8 inches   []      [] 33.0 33.5         4 inches   []      [] 29.5 30.0         Ankle   []      [] 24.0 24.5         Instep   []      [] 18.5 18.0       Measurements are taken in centimeters:  2.54 cm = 1 inch                Physical Skills Involved:  1. Sensation  2. Pain (Chronic)  3. Edema  4. Skin Integrity  5. tightness/heaviness Cognitive Skills Affected (resulting in the inability to perform in a timely and safe manner):  1. N/A Psychosocial Skills Affected:  1. Habits/Routines   Number of elements that affect the Plan of Care: 3-5:  MODERATE COMPLEXITY   CLINICAL DECISION MAKING:   Outcome Measure: Tool Used: Lymphedema Life Impact Scale   Score:  Initial: 14 Most Recent: X (Date: -- )   Interpretation of Score: The Lymphedema Life Impact Scale (LLIS) is a validated instrument that measures the physical, functional, and psychosocial concerns pertinent to patients with extremity lymphedema. The Scale's questionnaire is administered to patients to gauge impairments, activity limitations, and participation restrictions resulting from their lymphedema. Score 0 1-13 14-26 27-40 41-54 55-67 68   Modifier CH CI CJ CK CL CM CN     ? Other PT/OT Primary Functional Limitations:     - CURRENT STATUS: CJ - 20%-39% impaired, limited or restricted    - GOAL STATUS: CI - 1%-19% impaired, limited or restricted    - D/C STATUS:  ---------------To be determined---------------       Medical Necessity:   · Patient is expected to demonstrate progress in reduction of edema to reduce risk for cellulitis. Reason for Services/Other Comments:  · Patient continues to require skilled intervention due to bilateral lower extremity lymphedema.      Use of outcome tool(s) and clinical judgement create a POC that gives a: Questionable prediction of patient's progress: MODERATE COMPLEXITY   TREATMENT:   (In addition to Assessment/Re-Assessment sessions the following treatments were rendered)  4/9/2018      Pre-treatment Symptoms/Complaints:  Pt with decrease of 13.5cm  R LE ans 9 cm L LE circumferencial volumetric measurements; stabilized over the weekend. Requested prescription for class 1 compression hose. Needs long term compression management garments  Pain: Initial:   Pain Intensity 1: 2  Post Session:  2   Occupational Therapy Treatments:    OT eval(X  ) OT eval was completed. Pt received information on lymphedema and risk reduction/self management practices as outlined by the National Lymphedema Network. Therapeutic Exercise (minutes):     HEP:  As above; handouts given to patient for all exercises. Manual Therapy:  60 minutes   MLD followed by skin care and 20 minute compression pump trial.          Manual Lymph Drainage:( 50 minutes)           Lymph Nodes:    Cervical Supraclavicular Axillary Abdominal Inguinal Popliteal Antecubital   RIGHT [x]     [x]     [x]     []     [x]     [x]     []       LEFT [x]     [x]     [x]     []     [x]     [x]     []          Anastamoses:   Axillo-axillary Inguino-inguinal Axillo-inguinal Inguino-axillary   ANTERIOR []     []     []     [x]       POSTERIOR []     []     []     []       RIGHT []     []     []     [x]       LEFT []     []     []     [x]         Limbs:   []    RUE     []    LUE     [x]    RLE    [x]    LLE   Compression: (10 Minutes):  Ardelia Amsler applied. Pt deferred bandaging. Measured for compression hose and recommend Mediven 20-30 mm Hg, size 3, open toe natural standard length. Waiting for MD prescription; refaxed request      Treatment/Session Assessment:    · Response to Treatment:  Pt in agreement with POC and goals. · Compliance with Program/Exercises:Good compliance  · Recommendations/Intent for next treatment session:  \"Next visit will focus on complete decongestive therapy and long term compression management  Total Treatment Duration:  45 minutes  OT Patient Time In/Time Out  Time In: 0400  Time Out: [address], OT

## 2018-04-12 ENCOUNTER — HOSPITAL ENCOUNTER (OUTPATIENT)
Dept: PHYSICAL THERAPY | Age: 68
Discharge: HOME | End: 2018-04-12
Payer: COMMERCIAL

## 2018-04-12 PROCEDURE — 97140 MANUAL THERAPY 1/> REGIONS: CPT

## 2018-04-12 NOTE — PROGRESS NOTES
Lindsey Dominguez  : 1950  Primary: [facility]  Secondary: [address] at [facility]  [address], [facility], [address]  [phone]   [phone]         OUTPATIENT OCCUPATIONAL THERAPY: Daily Note 2018    ICD-10: Treatment Diagnosis: I89.0, lymphedema not elsewhere classified  G89.0, pain not elsewhere classified  M62.81, muscle weakness generalized  Precautions/Allergies:   Codeine and Tramadol   Fall Risk Score: 1 (? 5 = High Risk)  MD Orders: eval and treat MEDICAL/REFERRING DIAGNOSIS:   Localized edema [R60.0]   DATE OF ONSET:    REFERRING PHYSICIAN: Whit Benoit MD  RETURN PHYSICIAN APPOINTMENT: to be determined      INITIAL ASSESSMENT:  Ms. Shelley Diaz presents with bilateral lower extremity 3+/5 pitting edema from her knees to her feet. She has history of THR in . She reports her swelling started in  and is not reversible. She has no tissue changes or history of cellulitis. 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 She is wearing 15-23 mmHg compression zipper socks daily at work. She reports tightness, limb heaviness, and pain. Pt is also diabetic with neuropathy in her hands and feet. She lives with her  and two adult children. She is here to manage her leg swelling. PLAN OF CARE:   PROBLEM LIST:  1. Edema/Girth  2. Decreased Knowledge of Precautions  3. Decreased Skin Integrity/Hygeine  4. Decreased [place] with Home Exercise Program INTERVENTIONS PLANNED  1. Skin care  2. Compression bandaging  3. Fitting for compression garment(s)  4. Manual therapy/Manual lymph drainage  5. Therapeutic exercise/Therapeutic activities  6. Patient Education  7. Compression pump trial prn  8.  kinesiotaping    TREATMENT PLAN:  Effective Dates: 3/12/18 TO 18.   Frequency/Duration:  2x a week for 90 days and upon reassessment will adjust frequency and duration as progress indicates. GOALS: (Goals have been discussed and agreed upon with patient.)  Short-Term Functional Goals: Time Frame: 45 days  1. The patient/caregiver will verbalize understanding of lymphedema precautions. 2. Patient will be independent with skin care regimen to decrease risk of cellulitis. 3. The patient/caregiver will be independent at donning and doffing  lower extremity compression bandages. 4. The patient/caregiver will be independent with self-manual lymph drainage techniques and show decrease in limb volume. 5. Patient will be independent in lymphatic exercises. Discharge Goals: Time Frame: 90 days  1. Patient's bilateral lower extremity circumferential measurements will decrease 5 to 10 cm on volumetric graph to maximize functional use in ADL's.    2. The patient/caregiver will be independent with home management of lymphedema. 3. Patient/caregiver will be independent donning and doffing bilateral lower extremity compression garment. Rehabilitation Potential For Stated Goals: Fair  Regarding Randy Huang's therapy, I certify that the treatment plan above will be carried out by a therapist or under their direction. Thank you for this referral,  Aravind Orozco, OTR/L     Referring Physician Signature: Cori Kearney MD _________________________  Date _________            The information in this section was collected on 4/2/2018   (except where otherwise noted). OCCUPATIONAL PROFILE & HISTORY:   History of Present Injury/Illness (Reason for Referral):    Ms. Flor Worley presents with bilateral lower extremity 3+/5 pitting edema from her knees to her feet. She has history of THR in 2015. She reports her swelling started in 2017 and is not reversible. She has no tissue changes or history of cellulitis. 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 She is wearing 15-23 mmHg compression zipper socks daily at work.   She reports tightness, limb heaviness, and pain. Pt is also diabetic with neuropathy in her hands and feet. She lives with her  and two adult children. She is here to manage her leg swelling. Past Medical History/Comorbidities:   Ms. Андрей Salinas  has a past medical history of Chronic musculoskeletal pain; Cystocele with rectocele; Essential hypertension (2/9/2016); GERD (gastroesophageal reflux disease); Hip fracture requiring operative repair ([facility] (1/9/2015); Hip fracture, left ([facility] (1/7/2015); Hyperlipidemia; Hypothyroidism; Impaired mobility and ADLs; Pure hypercholesterolemia (2/9/2016); and Type 2 diabetes mellitus without complication ([facility] (5/1/2414). Ms. Андрей Salinas  has a past surgical history that includes hx tonsillectomy; hx tubal ligation; hx dilation and curettage; hx colonoscopy; hx magali and bso; and hx hip fracture tx (Left). Social History/Living Environment:        Pt lives with her  and 2 adult children  Prior Level of Function/Work/Activity:  Pt works full time in a school [employer]. She is independent with ADLs/IADLs with difficulty donning and doffing socks and shoes due to s/p THR and limited hip flexion    Current Medications:    Current Outpatient Prescriptions:     TURMERIC, BULK,, by Does Not Apply route., Disp: , Rfl:     HUMALOG KWIKPEN 100 unit/mL kwikpen, INJECT 40  UNITS SUBCUTANEOUSLY AT BEDTIME, Disp: 15 Pen, Rfl: 11    ezetimibe (ZETIA) 10 mg tablet, Take 1 Tab by mouth daily. , Disp: 90 Tab, Rfl: 3    gabapentin (NEURONTIN) 600 mg tablet, TAKE ONE TABLET BY MOUTH THREE TIMES DAILY, Disp: 270 Tab, Rfl: 3    cyclobenzaprine (FLEXERIL) 10 mg tablet, Take 1 Tab by mouth daily. , Disp: 90 Tab, Rfl: 3    traMADol (ULTRAM) 50 mg tablet, TAKE ONE TABLET BY MOUTH TWICE DAILY AS NEEDED FOR 30 DAYS, Disp: 60 Tab, Rfl: 5    BD INSULIN PEN NEEDLE UF SHORT 31 gauge x 5/16\" ndle, USE  TWICE DAILY AS DIRECTED WITH  INSULIN  PENS, Disp: 100 Pen Needle, Rfl: [facility]. 300 unit/mL (1.5 mL) inpn, INJECT 70  UNITS SUBCUTANEOUSLY ONCE DAILY WITH DINNER, Disp: 18 Pen, Rfl: 11    glyBURIDE-metFORMIN (GLUCOVANCE) 5-500 mg per tablet, Take 1 Tab by mouth Daily (before breakfast). Indications: type 2 diabetes mellitus, Disp: 90 Tab, Rfl: 3    atorvastatin (LIPITOR) 80 mg tablet, Take 1 Tab by mouth daily. Indications: hyperlipidemia, Disp: 90 Tab, Rfl: 3    oxybutynin chloride XL (DITROPAN XL) 10 mg CR tablet, Take 1 Tab by mouth daily. , Disp: 30 Tab, Rfl: 11    meloxicam (MOBIC) 15 mg tablet, Take 1 Tab by mouth daily. , Disp: 30 Tab, Rfl: 11    cyanocobalamin 1,000 mcg tablet, Take 1,000 mcg by mouth daily. , Disp: , Rfl:     levothyroxine (SYNTHROID) 100 mcg tablet, TAKE ONE TABLET BY MOUTH ONCE DAILY BEFORE BREAKFAST FOR HYPOTHYROIDISM, Disp: 90 Tab, Rfl: 3    ONETOUCH ULTRA TEST strip, USE TO TEST BLOOD SUGARS THREE TIMES A DAY, Disp: 300 Strip, Rfl: 11    ONETOUCH ULTRA TEST strip, TEST BLOOD SUGAR TWICE A   DAY, Disp: 200 Strip, Rfl: 2    CALCIUM CARB/VIT D3/MINERALS (CALCIUM-VITAMIN D PO), Take  by mouth., Disp: , Rfl:     MULTIVITAMIN PO, Take  by mouth., Disp: , Rfl:             Date Last Reviewed:  4/2/2018     Complexity Level : Expanded review of therapy/medical records (1-2):  MODERATE COMPLEXITY   ASSESSMENT OF OCCUPATIONAL PERFORMANCE:   Palpation:          Pitting 3+/5 lower extremity edema from knees to feet bilaterally  Skin Integrity:          Excessive dryness, otherwise no tissue changes  Sensation:  Neuropathy in feet  Functional Mobility:  Ambulates without assistive device; independent  Activities of Daily Living Mod A shoes and socks due to limited hip flexion; has sock aid and long shoe horn at home  Edema/Girth:  3+ and pitting    Right Left    Initial Most Recent Initial Most Recent   Lower  Extremity 137 cm   139 cm        PRETREATMENT AFFECTED LIMB(s): right lower extremity  left lower extremity      Date:  3/12/18 3/12/18        Right / Left Right Left         Groin   []      []           8 inches   []      []           4 inches   []      []         PoplitealSpace   []      [] 32.0 cm 33.0 cm         8 inches   []      [] 33.0 33.5         4 inches   []      [] 29.5 30.0         Ankle   []      [] 24.0 24.5         Instep   []      [] 18.5 18.0       Measurements are taken in centimeters:  2.54 cm = 1 inch                Physical Skills Involved:  1. Sensation  2. Pain (Chronic)  3. Edema  4. Skin Integrity  5. tightness/heaviness Cognitive Skills Affected (resulting in the inability to perform in a timely and safe manner):  1. N/A Psychosocial Skills Affected:  1. Habits/Routines   Number of elements that affect the Plan of Care: 3-5:  MODERATE COMPLEXITY   CLINICAL DECISION MAKING:   Outcome Measure: Tool Used: Lymphedema Life Impact Scale   Score:  Initial: 14 Most Recent: X (Date: -- )   Interpretation of Score: The Lymphedema Life Impact Scale (LLIS) is a validated instrument that measures the physical, functional, and psychosocial concerns pertinent to patients with extremity lymphedema. The Scale's questionnaire is administered to patients to gauge impairments, activity limitations, and participation restrictions resulting from their lymphedema. Score 0 1-13 14-26 27-40 41-54 55-67 68   Modifier CH CI CJ CK CL CM CN     ? Other PT/OT Primary Functional Limitations:     - CURRENT STATUS: CJ - 20%-39% impaired, limited or restricted    - GOAL STATUS: CI - 1%-19% impaired, limited or restricted    - D/C STATUS:  ---------------To be determined---------------       Medical Necessity:   · Patient is expected to demonstrate progress in reduction of edema to reduce risk for cellulitis. Reason for Services/Other Comments:  · Patient continues to require skilled intervention due to bilateral lower extremity lymphedema.      Use of outcome tool(s) and clinical judgement create a POC that gives a: Questionable prediction of patient's progress: MODERATE COMPLEXITY   TREATMENT:   (In addition to Assessment/Re-Assessment sessions the following treatments were rendered)  4/2/2018      Pre-treatment Symptoms/Complaints:  Pt with decrease of 13.5cm  R LE ans 9 cm L LE circumferencial volumetric measurements. Requested prescription for class 1 compression hose. Pain: Initial:   Pain Intensity 1: 2  Post Session:  2   Occupational Therapy Treatments:    OT eval(X  ) OT eval was completed. Pt received information on lymphedema and risk reduction/self management practices as outlined by the National Lymphedema Network. Therapeutic Exercise (minutes):     HEP:  As above; handouts given to patient for all exercises. Manual Therapy:  45 minutes   MLD followed by skin care and 20 minute compression pump trial.          Manual Lymph Drainage:( 40 minutes)           Lymph Nodes:    Cervical Supraclavicular Axillary Abdominal Inguinal Popliteal Antecubital   RIGHT [x]     [x]     [x]     []     [x]     [x]     []       LEFT [x]     [x]     [x]     []     [x]     [x]     []          Anastamoses:   Axillo-axillary Inguino-inguinal Axillo-inguinal Inguino-axillary   ANTERIOR []     []     []     [x]       POSTERIOR []     []     []     []       RIGHT []     []     []     [x]       LEFT []     []     []     [x]         Limbs:   []    RUE     []    LUE     [x]    RLE    [x]    LLE   Compression: (5  Minutes):  Surgrip applied. Pt deferred bandaging. Measured for compression hose and recommend Mediven 20-30 mm Hg, size 3, open toe natural standard length. Waiting for MD prescription. Treatment/Session Assessment:    · Response to Treatment:  Pt in agreement with POC and goals. · Compliance with Program/Exercises: Will assess as treatment progresses. · Recommendations/Intent for next treatment session: \"Next visit will focus on complete decongestive therapy\".   Total Treatment Duration:  45 minutes  OT Patient Time In/Time Out  Time In: 1100  Time Out: Jenn Conteh

## 2018-04-12 NOTE — PROGRESS NOTES
Agueda Palm  : 1950  Primary: Jenna Fernandes [facility]  Secondary: [address] at [facility] Therapy  [address], [facility], [address]  [phone]   [phone]         OUTPATIENT OCCUPATIONAL THERAPY: Daily Note 2018    ICD-10: Treatment Diagnosis: I89.0, lymphedema not elsewhere classified  G89.0, pain not elsewhere classified  M62.81, muscle weakness generalized  Precautions/Allergies:   Codeine and Tramadol   Fall Risk Score: 1 (? 5 = High Risk)  MD Orders: eval and treat MEDICAL/REFERRING DIAGNOSIS:   Localized edema [R60.0]   DATE OF ONSET:    REFERRING PHYSICIAN: Nadia Infante MD  RETURN PHYSICIAN APPOINTMENT: to be determined      INITIAL ASSESSMENT:  Ms. Eben Kelsey presents with bilateral lower extremity 3+/5 pitting edema from her knees to her feet. She has history of THR in . She reports her swelling started in  and is not reversible. She has no tissue changes or history of cellulitis. 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 She is wearing 15-23 mmHg compression zipper socks daily at work. She reports tightness, limb heaviness, and pain. Pt is also diabetic with neuropathy in her hands and feet. She lives with her  and two adult children. She is here to manage her leg swelling. PLAN OF CARE:   PROBLEM LIST:  1. Edema/Girth  2. Decreased Knowledge of Precautions  3. Decreased Skin Integrity/Hygeine  4. Decreased [place] with Home Exercise Program INTERVENTIONS PLANNED  1. Skin care  2. Compression bandaging  3. Fitting for compression garment(s)  4. Manual therapy/Manual lymph drainage  5. Therapeutic exercise/Therapeutic activities  6. Patient Education  7. Compression pump trial prn  8.  kinesiotaping    TREATMENT PLAN:  Effective Dates: 3/12/18 TO 18.   Frequency/Duration:  2x a week for 90 days and upon reassessment will adjust frequency and duration as progress indicates. GOALS: (Goals have been discussed and agreed upon with patient.)  Short-Term Functional Goals: Time Frame: 45 days  1. The patient/caregiver will verbalize understanding of lymphedema precautions. 2. Patient will be independent with skin care regimen to decrease risk of cellulitis. 3. The patient/caregiver will be independent at donning and doffing  lower extremity compression bandages. 4. The patient/caregiver will be independent with self-manual lymph drainage techniques and show decrease in limb volume. 5. Patient will be independent in lymphatic exercises. Discharge Goals: Time Frame: 90 days  1. Patient's bilateral lower extremity circumferential measurements will decrease 5 to 10 cm on volumetric graph to maximize functional use in ADL's.    2. The patient/caregiver will be independent with home management of lymphedema. 3. Patient/caregiver will be independent donning and doffing bilateral lower extremity compression garment. Rehabilitation Potential For Stated Goals: Fair  Regarding Filiberto Huang's therapy, I certify that the treatment plan above will be carried out by a therapist or under their direction. Thank you for this referral,  Harshad Koch OTR/L     Referring Physician Signature: Gisel Mchugh MD _________________________  Date _________            The information in this section was collected on 4/12/2018   (except where otherwise noted). OCCUPATIONAL PROFILE & HISTORY:   History of Present Injury/Illness (Reason for Referral):    Ms. Lisa Stevens presents with bilateral lower extremity 3+/5 pitting edema from her knees to her feet. She has history of THR in 2015. She reports her swelling started in 2017 and is not reversible. She has no tissue changes or history of cellulitis. 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 She is wearing 15-23 mmHg compression zipper socks daily at work. She reports tightness, limb heaviness, and pain. Pt is also diabetic with neuropathy in her hands and feet. She lives with her  and two adult children. She is here to manage her leg swelling. Past Medical History/Comorbidities:   Ms. Julissa Fields  has a past medical history of Chronic musculoskeletal pain; Cystocele with rectocele; Essential hypertension (2/9/2016); GERD (gastroesophageal reflux disease); Hip fracture requiring operative repair ([facility] (1/9/2015); [facility] (1/7/2015); Hyperlipidemia; Hypothyroidism; Impaired mobility and ADLs; Pure hypercholesterolemia (2/9/2016); and Type 2 diabetes mellitus without complication ([facility] (3/3/3930). Ms. Julissa Fields  has a past surgical history that includes hx tonsillectomy; hx tubal ligation; hx dilation and curettage; hx colonoscopy; hx magali and bso; and hx hip fracture tx (Left). Social History/Living Environment:        Pt lives with her  and 2 adult children  Prior Level of Function/Work/Activity:  Pt works full time in a school [employer]. She is independent with ADLs/IADLs with difficulty donning and doffing socks and shoes due to s/p THR and limited hip flexion    Current Medications:    Current Outpatient Prescriptions:     TURMERIC, BULK,, by Does Not Apply route., Disp: , Rfl:     HUMALOG KWIKPEN 100 unit/mL kwikpen, INJECT 40  UNITS SUBCUTANEOUSLY AT BEDTIME, Disp: 15 Pen, Rfl: 11    ezetimibe (ZETIA) 10 mg tablet, Take 1 Tab by mouth daily. , Disp: 90 Tab, Rfl: 3    gabapentin (NEURONTIN) 600 mg tablet, TAKE ONE TABLET BY MOUTH THREE TIMES DAILY, Disp: 270 Tab, Rfl: 3    cyclobenzaprine (FLEXERIL) 10 mg tablet, Take 1 Tab by mouth daily. , Disp: 90 Tab, Rfl: 3    traMADol (ULTRAM) 50 mg tablet, TAKE ONE TABLET BY MOUTH TWICE DAILY AS NEEDED FOR 30 DAYS, Disp: 60 Tab, Rfl: 5    BD INSULIN PEN NEEDLE UF SHORT 31 gauge x 5/16\" ndle, USE  TWICE DAILY AS DIRECTED WITH  INSULIN  PENS, Disp: 100 Pen Needle, Rfl: 17    TOUJEO SOLOSTAR 300 unit/mL (1.5 mL) inpn, INJECT 70  UNITS SUBCUTANEOUSLY ONCE DAILY WITH DINNER, Disp: 18 Pen, Rfl: 11    glyBURIDE-metFORMIN (GLUCOVANCE) 5-500 mg per tablet, Take 1 Tab by mouth Daily (before breakfast). Indications: type 2 diabetes mellitus, Disp: 90 Tab, Rfl: 3    atorvastatin (LIPITOR) 80 mg tablet, Take 1 Tab by mouth daily. Indications: hyperlipidemia, Disp: 90 Tab, Rfl: 3    oxybutynin chloride XL (DITROPAN XL) 10 mg CR tablet, Take 1 Tab by mouth daily. , Disp: 30 Tab, Rfl: 11    meloxicam (MOBIC) 15 mg tablet, Take 1 Tab by mouth daily. , Disp: 30 Tab, Rfl: 11    cyanocobalamin 1,000 mcg tablet, Take 1,000 mcg by mouth daily. , Disp: , Rfl:     levothyroxine (SYNTHROID) 100 mcg tablet, TAKE ONE TABLET BY MOUTH ONCE DAILY BEFORE BREAKFAST FOR HYPOTHYROIDISM, Disp: 90 Tab, Rfl: 3    ONETOUCH ULTRA TEST strip, USE TO TEST BLOOD SUGARS THREE TIMES A DAY, Disp: 300 Strip, Rfl: 11    ONETOUCH ULTRA TEST strip, TEST BLOOD SUGAR TWICE A   DAY, Disp: 200 Strip, Rfl: 2    CALCIUM CARB/VIT D3/MINERALS (CALCIUM-VITAMIN D PO), Take  by mouth., Disp: , Rfl:     MULTIVITAMIN PO, Take  by mouth., Disp: , Rfl:             Date Last Reviewed:  4/12/2018     Complexity Level : Expanded review of therapy/medical records (1-2):  MODERATE COMPLEXITY   ASSESSMENT OF OCCUPATIONAL PERFORMANCE:   Palpation:          Pitting 3+/5 lower extremity edema from knees to feet bilaterally  Skin Integrity:          Excessive dryness, otherwise no tissue changes  Sensation:  Neuropathy in feet  Functional Mobility:  Ambulates without assistive device; independent  Activities of Daily Living Mod A shoes and socks due to limited hip flexion; has sock aid and long shoe horn at home  Edema/Girth:  3+ and pitting    Right Left    Initial Most Recent Initial Most Recent   Lower  Extremity 137 cm   139 cm        PRETREATMENT AFFECTED LIMB(s): right lower extremity  left lower extremity      Date:  3/12/18 3/12/18        Right / Left Right Left         Groin   []      []           8 inches   []      []           4 inches   []      []         PoplitealSpace   []      [] 32.0 cm 33.0 cm         8 inches   []      [] 33.0 33.5         4 inches   []      [] 29.5 30.0         Ankle   []      [] 24.0 24.5         Instep   []      [] 18.5 18.0       Measurements are taken in centimeters:  2.54 cm = 1 inch                Physical Skills Involved:  1. Sensation  2. Pain (Chronic)  3. Edema  4. Skin Integrity  5. tightness/heaviness Cognitive Skills Affected (resulting in the inability to perform in a timely and safe manner):  1. N/A Psychosocial Skills Affected:  1. Habits/Routines   Number of elements that affect the Plan of Care: 3-5:  MODERATE COMPLEXITY   CLINICAL DECISION MAKING:   Outcome Measure: Tool Used: Lymphedema Life Impact Scale   Score:  Initial: 14 Most Recent: X (Date: -- )   Interpretation of Score: The Lymphedema Life Impact Scale (LLIS) is a validated instrument that measures the physical, functional, and psychosocial concerns pertinent to patients with extremity lymphedema. The Scale's questionnaire is administered to patients to gauge impairments, activity limitations, and participation restrictions resulting from their lymphedema. Score 0 1-13 14-26 27-40 41-54 55-67 68   Modifier CH CI CJ CK CL CM CN     ? Other PT/OT Primary Functional Limitations:     - CURRENT STATUS: CJ - 20%-39% impaired, limited or restricted    - GOAL STATUS: CI - 1%-19% impaired, limited or restricted    - D/C STATUS:  ---------------To be determined---------------       Medical Necessity:   · Patient is expected to demonstrate progress in reduction of edema to reduce risk for cellulitis. Reason for Services/Other Comments:  · Patient continues to require skilled intervention due to bilateral lower extremity lymphedema.      Use of outcome tool(s) and clinical judgement create a POC that gives a: Questionable prediction of patient's progress: MODERATE COMPLEXITY   TREATMENT:   (In addition to Assessment/Re-Assessment sessions the following treatments were rendered)  4/12/2018      Pre-treatment Symptoms/Complaints:  Pt with decrease of 13.5cm  R LE ans 9 cm L LE circumferencial volumetric measurements; stabilized over the weekend. Received prescription for class 1 compression hose. Pain: Initial:   Pain Intensity 1: 2  Post Session:  2   Occupational Therapy Treatments:    OT eval(X  ) OT eval was completed. Pt received information on lymphedema and risk reduction/self management practices as outlined by the National Lymphedema Network. Therapeutic Exercise (minutes):     HEP:  As above; handouts given to patient for all exercises. Manual Therapy:  60 minutes   MLD followed by skin care and 30 minute compression pump trial.          Manual Lymph Drainage:( 55 minutes)           Lymph Nodes:    Cervical Supraclavicular Axillary Abdominal Inguinal Popliteal Antecubital   RIGHT [x]     [x]     [x]     []     [x]     [x]     []       LEFT [x]     [x]     [x]     []     [x]     [x]     []          Anastamoses:   Axillo-axillary Inguino-inguinal Axillo-inguinal Inguino-axillary   ANTERIOR []     []     []     [x]       POSTERIOR []     []     []     []       RIGHT []     []     []     [x]       LEFT []     []     []     [x]         Limbs:   []    RUE     []    LUE     [x]    RLE    [x]    LLE   Compression: (5 Minutes):  Surgrip applied. Pt deferred bandaging. Ordered compression hose: Mediven 20-30 mm Hg, size 3, open toe natural standard length. Treatment/Session Assessment:    · Response to Treatment:  Pt in agreement with POC and goals. · Compliance with Program/Exercises:Good compliance  · Recommendations/Intent for next treatment session:  \"Next visit will focus on complete decongestive therapy and long term compression management  Total Treatment Duration:  60 minutes  OT Patient Time In/Time Out  Time In: 0400  Time Out: [address], OT

## 2018-04-12 NOTE — PROGRESS NOTES
Mainor Nieto  : 1950  Primary: Abilio Johnson [facility]  Secondary: [address] at [place] [address] Therapy  [address], [address]  [phone]   [phone]         OUTPATIENT OCCUPATIONAL THERAPY: Daily Note 2018    ICD-10: Treatment Diagnosis: I89.0, lymphedema not elsewhere classified  G89.0, pain not elsewhere classified  M62.81, muscle weakness generalized  Precautions/Allergies:   Codeine and Tramadol   Fall Risk Score: 1 (? 5 = High Risk)  MD Orders: eval and treat MEDICAL/REFERRING DIAGNOSIS:   Localized edema [R60.0]   DATE OF ONSET:    REFERRING PHYSICIAN: Erika Macdonald MD  RETURN PHYSICIAN APPOINTMENT: to be determined      INITIAL ASSESSMENT:  Ms. Yuval Cooley presents with bilateral lower extremity 3+/5 pitting edema from her knees to her feet. She has history of THR in . She reports her swelling started in  and is not reversible. She has no tissue changes or history of cellulitis. 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 She is wearing 15-23 mmHg compression zipper socks daily at work. She reports tightness, limb heaviness, and pain. Pt is also diabetic with neuropathy in her hands and feet. She lives with her  and two adult children. She is here to manage her leg swelling. PLAN OF CARE:   PROBLEM LIST:  1. Edema/Girth  2. Decreased Knowledge of Precautions  3. Decreased Skin Integrity/Hygeine  4. Decreased [place] with Home Exercise Program INTERVENTIONS PLANNED  1. Skin care  2. Compression bandaging  3. Fitting for compression garment(s)  4. Manual therapy/Manual lymph drainage  5. Therapeutic exercise/Therapeutic activities  6. Patient Education  7. Compression pump trial prn  8.  kinesiotaping    TREATMENT PLAN:  Effective Dates: 3/12/18 TO 18.   Frequency/Duration:  2x a week for 90 days and upon reassessment will adjust frequency and duration as progress indicates. GOALS: (Goals have been discussed and agreed upon with patient.)  Short-Term Functional Goals: Time Frame: 45 days  1. The patient/caregiver will verbalize understanding of lymphedema precautions. 2. Patient will be independent with skin care regimen to decrease risk of cellulitis. 3. The patient/caregiver will be independent at donning and doffing  lower extremity compression bandages. 4. The patient/caregiver will be independent with self-manual lymph drainage techniques and show decrease in limb volume. 5. Patient will be independent in lymphatic exercises. Discharge Goals: Time Frame: 90 days  1. Patient's bilateral lower extremity circumferential measurements will decrease 5 to 10 cm on volumetric graph to maximize functional use in ADL's.    2. The patient/caregiver will be independent with home management of lymphedema. 3. Patient/caregiver will be independent donning and doffing bilateral lower extremity compression garment. Rehabilitation Potential For Stated Goals: Fair  Regarding Juan Huang's therapy, I certify that the treatment plan above will be carried out by a therapist or under their direction. Thank you for this referral,  Marilynn Kehr, OTR/L     Referring Physician Signature: Mike Jackson MD _________________________  Date _________            The information in this section was collected on 3/26/2018   (except where otherwise noted). OCCUPATIONAL PROFILE & HISTORY:   History of Present Injury/Illness (Reason for Referral):    Ms. Yusra Isbell presents with bilateral lower extremity 3+/5 pitting edema from her knees to her feet. She has history of THR in 2015. She reports her swelling started in 2017 and is not reversible. She has no tissue changes or history of cellulitis. 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 She is wearing 15-23 mmHg compression zipper socks daily at work. She reports tightness, limb heaviness, and pain. Pt is also diabetic with neuropathy in her hands and feet. She lives with her  and two adult children. She is here to manage her leg swelling. Past Medical History/Comorbidities:   Ms. Barb Donis  has a past medical history of Chronic musculoskeletal pain; Cystocele with rectocele; Essential hypertension (2/9/2016); GERD (gastroesophageal reflux disease); [facility] fracture requiring operative repair ([facility] (1/9/2015); [facility] ([facility]/7/2015); Hyperlipidemia; Hypothyroidism; Impaired mobility and ADLs; Pure hypercholesterolemia (2/9/2016); and Type 2 diabetes mellitus without complication ([facility] (4/2/9555). Ms. Barb Donis  has a past surgical history that includes hx tonsillectomy; hx tubal ligation; hx dilation and curettage; hx colonoscopy; hx magali and bso; and hx hip fracture tx (Left). Social History/Living Environment:        Pt lives with her  and 2 adult children  Prior Level of Function/Work/Activity:  Pt works full time in a school [employer]. She is independent with ADLs/IADLs with difficulty donning and doffing socks and shoes due to s/p THR and limited hip flexion    Current Medications:    Current Outpatient Prescriptions:     TURMERIC, BULK,, by Does Not Apply route., Disp: , Rfl:     HUMALOG KWIKPEN 100 unit/mL kwikpen, INJECT 40  UNITS SUBCUTANEOUSLY AT BEDTIME, Disp: 15 Pen, Rfl: 11    ezetimibe (ZETIA) 10 mg tablet, Take 1 Tab by mouth daily. , Disp: 90 Tab, Rfl: 3    gabapentin (NEURONTIN) 600 mg tablet, TAKE ONE TABLET BY MOUTH THREE TIMES DAILY, Disp: 270 Tab, Rfl: 3    cyclobenzaprine (FLEXERIL) 10 mg tablet, Take 1 Tab by mouth daily. , Disp: 90 Tab, Rfl: 3    traMADol (ULTRAM) 50 mg tablet, TAKE ONE TABLET BY MOUTH TWICE DAILY AS NEEDED FOR 30 DAYS, Disp: 60 Tab, Rfl: 5    BD INSULIN PEN NEEDLE UF SHORT 31 gauge x 5/16\" ndle, USE  TWICE DAILY AS DIRECTED WITH  INSULIN  PENS, Disp: 100 Pen Needle, Rfl: 17    TOUJEO SOLOSTAR 300 unit/mL (1.5 mL) inpn, INJECT 70  UNITS SUBCUTANEOUSLY ONCE DAILY WITH DINNER, Disp: 18 Pen, Rfl: 11    glyBURIDE-metFORMIN (GLUCOVANCE) 5-500 mg per tablet, Take 1 Tab by mouth Daily (before breakfast). Indications: type 2 diabetes mellitus, Disp: 90 Tab, Rfl: 3    atorvastatin (LIPITOR) 80 mg tablet, Take 1 Tab by mouth daily. Indications: hyperlipidemia, Disp: 90 Tab, Rfl: 3    oxybutynin chloride XL (DITROPAN XL) 10 mg CR tablet, Take 1 Tab by mouth daily. , Disp: 30 Tab, Rfl: 11    meloxicam (MOBIC) 15 mg tablet, Take 1 Tab by mouth daily. , Disp: 30 Tab, Rfl: 11    cyanocobalamin 1,000 mcg tablet, Take 1,000 mcg by mouth daily. , Disp: , Rfl:     levothyroxine (SYNTHROID) 100 mcg tablet, TAKE ONE TABLET BY MOUTH ONCE DAILY BEFORE BREAKFAST FOR HYPOTHYROIDISM, Disp: 90 Tab, Rfl: 3    ONETOUCH ULTRA TEST strip, USE TO TEST BLOOD SUGARS THREE TIMES A DAY, Disp: 300 Strip, Rfl: 11    ONETOUCH ULTRA TEST strip, TEST BLOOD SUGAR TWICE A   DAY, Disp: 200 Strip, Rfl: 2    CALCIUM CARB/VIT D3/MINERALS (CALCIUM-VITAMIN D PO), Take  by mouth., Disp: , Rfl:     MULTIVITAMIN PO, Take  by mouth., Disp: , Rfl:             Date Last Reviewed:  3/26/2018     Complexity Level : Expanded review of therapy/medical records (1-2):  MODERATE COMPLEXITY   ASSESSMENT OF OCCUPATIONAL PERFORMANCE:   Palpation:          Pitting 3+/5 lower extremity edema from knees to feet bilaterally  Skin Integrity:          Excessive dryness, otherwise no tissue changes  Sensation:  Neuropathy in feet  Functional Mobility:  Ambulates without assistive device; independent  Activities of Daily Living Mod A shoes and socks due to limited hip flexion; has sock aid and long shoe horn at home  Edema/Girth:  3+ and pitting    Right Left    Initial Most Recent Initial Most Recent   Lower  Extremity 137 cm   139 cm        PRETREATMENT AFFECTED LIMB(s): right lower extremity  left lower extremity      Date:  3/12/18 3/12/18        Right / Left Right Left         Groin   []      []           8 inches   []      []           4 inches   []      []         PoplitealSpace   []      [] 32.0 cm 33.0 cm         8 inches   []      [] 33.0 33.5         4 inches   []      [] 29.5 30.0         Ankle   []      [] 24.0 24.5         Instep   []      [] 18.5 18.0       Measurements are taken in centimeters:  2.54 cm = 1 inch                Physical Skills Involved:  1. Sensation  2. Pain (Chronic)  3. Edema  4. Skin Integrity  5. tightness/heaviness Cognitive Skills Affected (resulting in the inability to perform in a timely and safe manner):  1. N/A Psychosocial Skills Affected:  1. Habits/Routines   Number of elements that affect the Plan of Care: 3-5:  MODERATE COMPLEXITY   CLINICAL DECISION MAKING:   Outcome Measure: Tool Used: Lymphedema Life Impact Scale   Score:  Initial: 14 Most Recent: X (Date: -- )   Interpretation of Score: The Lymphedema Life Impact Scale (LLIS) is a validated instrument that measures the physical, functional, and psychosocial concerns pertinent to patients with extremity lymphedema. The Scale's questionnaire is administered to patients to gauge impairments, activity limitations, and participation restrictions resulting from their lymphedema. Score 0 1-13 14-26 27-40 41-54 55-67 68   Modifier CH CI CJ CK CL CM CN     ? Other PT/OT Primary Functional Limitations:     - CURRENT STATUS: CJ - 20%-39% impaired, limited or restricted    - GOAL STATUS: CI - 1%-19% impaired, limited or restricted    - D/C STATUS:  ---------------To be determined---------------       Medical Necessity:   · Patient is expected to demonstrate progress in reduction of edema to reduce risk for cellulitis. Reason for Services/Other Comments:  · Patient continues to require skilled intervention due to bilateral lower extremity lymphedema.      Use of outcome tool(s) and clinical judgement create a POC that gives a: Questionable prediction of patient's progress: MODERATE COMPLEXITY   TREATMENT:   (In addition to Assessment/Re-Assessment sessions the following treatments were rendered)  3/26/2018      Pre-treatment Symptoms/Complaints:  bilateral lower extremity 3+/5 pitting edema from her knees to her feet  Pain: Initial:   Pain Intensity 1: 2  Post Session:  2   Occupational Therapy Treatments:    OT eval(X  ) OT eval was completed. Pt received information on lymphedema and risk reduction/self management practices as outlined by the National Lymphedema Network. Therapeutic Exercise (minutes):     HEP:  As above; handouts given to patient for all exercises. Manual Therapy:  45 minutes   MLD followed by skin care and compression bandaging L LE          Manual Lymph Drainage:( 35 minutes)           Lymph Nodes:    Cervical Supraclavicular Axillary Abdominal Inguinal Popliteal Antecubital   RIGHT [x]     [x]     [x]     []     [x]     [x]     []       LEFT [x]     [x]     [x]     []     [x]     [x]     []          Anastamoses:   Axillo-axillary Inguino-inguinal Axillo-inguinal Inguino-axillary   ANTERIOR []     []     []     [x]       POSTERIOR []     []     []     []       RIGHT []     []     []     [x]       LEFT []     []     []     [x]         Limbs:   []    RUE     []    LUE     [x]    RLE    [x]    LLE   Compression: ( minutes): Applied Eucerin lotion B LEs. Multilayer compression bandaging L LE using surgigrip and 2 short stretch bandages. Surgigrip R LE. Pt is working in  full time and is unable to tolerate bandaging both legs consecutively. She had good results with surgigrip light compression over the weekend. Will request prescription for Carlotta Keshia as this will enable her to have short stretch compression bilaterally and work. Treatment/Session Assessment:    · Response to Treatment:  Pt in agreement with POC and goals. · Compliance with Program/Exercises: Will assess as treatment progresses.   · Recommendations/Intent for next treatment session: \"Next visit will focus on complete decongestive therapy\".   Total Treatment Duration:  45 minutes  OT Patient Time In/Time Out  Time In: 0400  Time Out: [address], OT

## 2018-04-12 NOTE — PROGRESS NOTES
Agueda Palm  : 1950  Primary: Jenna Fernandes [facility]  Secondary: [address] at [facility]  [address], [facility], [address]  [phone]   [phone]         OUTPATIENT OCCUPATIONAL THERAPY: Daily Note 2018    ICD-10: Treatment Diagnosis: I89.0, lymphedema not elsewhere classified  G89.0, pain not elsewhere classified  M62.81, muscle weakness generalized  Precautions/Allergies:   Codeine and Tramadol   Fall Risk Score: 1 (? 5 = High Risk)  MD Orders: eval and treat MEDICAL/REFERRING DIAGNOSIS:   Localized edema [R60.0]   DATE OF ONSET:    REFERRING PHYSICIAN: Nadia Infante MD  RETURN PHYSICIAN APPOINTMENT: to be determined      INITIAL ASSESSMENT:  Ms. Eben Kelsey presents with bilateral lower extremity 3+/5 pitting edema from her knees to her feet. She has history of THR in . She reports her swelling started in  and is not reversible. She has no tissue changes or history of cellulitis. 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 She is wearing 15-23 mmHg compression zipper socks daily at work. She reports tightness, limb heaviness, and pain. Pt is also diabetic with neuropathy in her hands and feet. She lives with her  and two adult children. She is here to manage her leg swelling. PLAN OF CARE:   PROBLEM LIST:  1. Edema/Girth  2. Decreased Knowledge of Precautions  3. Decreased Skin Integrity/Hygeine  4. Decreased [place] with Home Exercise Program INTERVENTIONS PLANNED  1. Skin care  2. Compression bandaging  3. Fitting for compression garment(s)  4. Manual therapy/Manual lymph drainage  5. Therapeutic exercise/Therapeutic activities  6. Patient Education  7. Compression pump trial prn  8.  kinesiotaping    TREATMENT PLAN:  Effective Dates: 3/12/18 TO 18.   Frequency/Duration:  2x a week for 90 days and upon reassessment will adjust frequency and duration as progress indicates. GOALS: (Goals have been discussed and agreed upon with patient.)  Short-Term Functional Goals: Time Frame: 45 days  1. The patient/caregiver will verbalize understanding of lymphedema precautions. 2. Patient will be independent with skin care regimen to decrease risk of cellulitis. 3. The patient/caregiver will be independent at donning and doffing  lower extremity compression bandages. 4. The patient/caregiver will be independent with self-manual lymph drainage techniques and show decrease in limb volume. 5. Patient will be independent in lymphatic exercises. Discharge Goals: Time Frame: 90 days  1. Patient's bilateral lower extremity circumferential measurements will decrease 5 to 10 cm on volumetric graph to maximize functional use in ADL's.    2. The patient/caregiver will be independent with home management of lymphedema. 3. Patient/caregiver will be independent donning and doffing bilateral lower extremity compression garment. Rehabilitation Potential For Stated Goals: Fair  Regarding Timothy Huang's therapy, I certify that the treatment plan above will be carried out by a therapist or under their direction. Thank you for this referral,  Beatriz Reyes OTR/L     Referring Physician Signature: Cameron Palm MD _________________________  Date _________            The information in this section was collected on 4/5/2018   (except where otherwise noted). OCCUPATIONAL PROFILE & HISTORY:   History of Present Injury/Illness (Reason for Referral):    Ms. Fidel Oleary presents with bilateral lower extremity 3+/5 pitting edema from her knees to her feet. She has history of THR in 2015. She reports her swelling started in 2017 and is not reversible. She has no tissue changes or history of cellulitis. 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 She is wearing 15-23 mmHg compression zipper socks daily at work.   She reports tightness, limb heaviness, and pain. Pt is also diabetic with neuropathy in her hands and feet. She lives with her  and two adult children. She is here to manage her leg swelling. Past Medical History/Comorbidities:   Ms. Lindajo Olszewski  has a past medical history of Chronic musculoskeletal pain; Cystocele with rectocele; Essential hypertension (2/9/2016); GERD (gastroesophageal reflux disease); Hip fracture requiring operative repair ([facility] (1/9/2015); Hip fracture[facility] left ([facility] (1/7/2015); Hyperlipidemia; Hypothyroidism; Impaired mobility and ADLs; Pure hypercholesterolemia (2/9/2016); and Type 2 diabetes mellitus without complication ([facility] (1/7/6250). Ms. Lindajo Olszewski  has a past surgical history that includes hx tonsillectomy; hx tubal ligation; hx dilation and curettage; hx colonoscopy; hx magali and bso; and hx hip fracture tx (Left). Social History/Living Environment:        Pt lives with her  and 2 adult children  Prior Level of Function/Work/Activity:  Pt works full time in a school [employer]. She is independent with ADLs/IADLs with difficulty donning and doffing socks and shoes due to s/p THR and limited hip flexion    Current Medications:    Current Outpatient Prescriptions:     TURMERIC, BULK,, by Does Not Apply route., Disp: , Rfl:     HUMALOG KWIKPEN 100 unit/mL kwikpen, INJECT 40  UNITS SUBCUTANEOUSLY AT BEDTIME, Disp: 15 Pen, Rfl: 11    ezetimibe (ZETIA) 10 mg tablet, Take 1 Tab by mouth daily. , Disp: 90 Tab, Rfl: 3    gabapentin (NEURONTIN) 600 mg tablet, TAKE ONE TABLET BY MOUTH THREE TIMES DAILY, Disp: 270 Tab, Rfl: 3    cyclobenzaprine (FLEXERIL) 10 mg tablet, Take 1 Tab by mouth daily. , Disp: 90 Tab, Rfl: 3    traMADol (ULTRAM) 50 mg tablet, TAKE ONE TABLET BY MOUTH TWICE DAILY AS NEEDED FOR 30 DAYS, Disp: 60 Tab, Rfl: 5    BD INSULIN PEN NEEDLE UF SHORT 31 gauge x 5/16\" ndle, USE  TWICE DAILY AS DIRECTED WITH  INSULIN  PENS, Disp: 100 Pen Needle, Rfl: [facility]. 300 unit/mL (1.5 mL) inpn, INJECT 70  UNITS SUBCUTANEOUSLY ONCE DAILY WITH DINNER, Disp: 18 Pen, Rfl: 11    glyBURIDE-metFORMIN (GLUCOVANCE) 5-500 mg per tablet, Take 1 Tab by mouth Daily (before breakfast). Indications: type 2 diabetes mellitus, Disp: 90 Tab, Rfl: 3    atorvastatin (LIPITOR) 80 mg tablet, Take 1 Tab by mouth daily. Indications: hyperlipidemia, Disp: 90 Tab, Rfl: 3    oxybutynin chloride XL (DITROPAN XL) 10 mg CR tablet, Take 1 Tab by mouth daily. , Disp: 30 Tab, Rfl: 11    meloxicam (MOBIC) 15 mg tablet, Take 1 Tab by mouth daily. , Disp: 30 Tab, Rfl: 11    cyanocobalamin 1,000 mcg tablet, Take 1,000 mcg by mouth daily. , Disp: , Rfl:     levothyroxine (SYNTHROID) 100 mcg tablet, TAKE ONE TABLET BY MOUTH ONCE DAILY BEFORE BREAKFAST FOR HYPOTHYROIDISM, Disp: 90 Tab, Rfl: 3    ONETOUCH ULTRA TEST strip, USE TO TEST BLOOD SUGARS THREE TIMES A DAY, Disp: 300 Strip, Rfl: 11    ONETOUCH ULTRA TEST strip, TEST BLOOD SUGAR TWICE A   DAY, Disp: 200 Strip, Rfl: 2    CALCIUM CARB/VIT D3/MINERALS (CALCIUM-VITAMIN D PO), Take  by mouth., Disp: , Rfl:     MULTIVITAMIN PO, Take  by mouth., Disp: , Rfl:             Date Last Reviewed:  4/5/2018     Complexity Level : Expanded review of therapy/medical records (1-2):  MODERATE COMPLEXITY   ASSESSMENT OF OCCUPATIONAL PERFORMANCE:   Palpation:          Pitting 3+/5 lower extremity edema from knees to feet bilaterally  Skin Integrity:          Excessive dryness, otherwise no tissue changes  Sensation:  Neuropathy in feet  Functional Mobility:  Ambulates without assistive device; independent  Activities of Daily Living Mod A shoes and socks due to limited hip flexion; has sock aid and long shoe horn at home  Edema/Girth:  3+ and pitting    Right Left    Initial Most Recent Initial Most Recent   Lower  Extremity 137 cm   139 cm        PRETREATMENT AFFECTED LIMB(s): right lower extremity  left lower extremity      Date:  3/12/18 3/12/18        Right / Left Right Left         Groin   []      []           8 inches   []      []           4 inches   []      []         PoplitealSpace   []      [] 32.0 cm 33.0 cm         8 inches   []      [] 33.0 33.5         4 inches   []      [] 29.5 30.0         Ankle   []      [] 24.0 24.5         Instep   []      [] 18.5 18.0       Measurements are taken in centimeters:  2.54 cm = 1 inch                Physical Skills Involved:  1. Sensation  2. Pain (Chronic)  3. Edema  4. Skin Integrity  5. tightness/heaviness Cognitive Skills Affected (resulting in the inability to perform in a timely and safe manner):  1. N/A Psychosocial Skills Affected:  1. Habits/Routines   Number of elements that affect the Plan of Care: 3-5:  MODERATE COMPLEXITY   CLINICAL DECISION MAKING:   Outcome Measure: Tool Used: Lymphedema Life Impact Scale   Score:  Initial: 14 Most Recent: X (Date: -- )   Interpretation of Score: The Lymphedema Life Impact Scale (LLIS) is a validated instrument that measures the physical, functional, and psychosocial concerns pertinent to patients with extremity lymphedema. The Scale's questionnaire is administered to patients to gauge impairments, activity limitations, and participation restrictions resulting from their lymphedema. Score 0 1-13 14-26 27-40 41-54 55-67 68   Modifier CH CI CJ CK CL CM CN     ? Other PT/OT Primary Functional Limitations:     - CURRENT STATUS: CJ - 20%-39% impaired, limited or restricted    - GOAL STATUS: CI - 1%-19% impaired, limited or restricted    - D/C STATUS:  ---------------To be determined---------------       Medical Necessity:   · Patient is expected to demonstrate progress in reduction of edema to reduce risk for cellulitis. Reason for Services/Other Comments:  · Patient continues to require skilled intervention due to bilateral lower extremity lymphedema.      Use of outcome tool(s) and clinical judgement create a POC that gives a: Questionable prediction of patient's progress: MODERATE COMPLEXITY   TREATMENT:   (In addition to Assessment/Re-Assessment sessions the following treatments were rendered)  4/5/2018      Pre-treatment Symptoms/Complaints:  Pt with decrease of 13.5cm  R LE ans 9 cm L LE circumferencial volumetric measurements. Requested prescription for class 1 compression hose. Pt is stabilizing with reduction; needs long term compression management garments  Pain: Initial:   Pain Intensity 1: 2  Post Session:  2   Occupational Therapy Treatments:    OT eval(X  ) OT eval was completed. Pt received information on lymphedema and risk reduction/self management practices as outlined by the National Lymphedema Network. Therapeutic Exercise (minutes):     HEP:  As above; handouts given to patient for all exercises. Manual Therapy:  60 minutes   MLD followed by skin care and 20 minute compression pump trial.          Manual Lymph Drainage:( 50 minutes)           Lymph Nodes:    Cervical Supraclavicular Axillary Abdominal Inguinal Popliteal Antecubital   RIGHT [x]     [x]     [x]     []     [x]     [x]     []       LEFT [x]     [x]     [x]     []     [x]     [x]     []          Anastamoses:   Axillo-axillary Inguino-inguinal Axillo-inguinal Inguino-axillary   ANTERIOR []     []     []     [x]       POSTERIOR []     []     []     []       RIGHT []     []     []     [x]       LEFT []     []     []     [x]         Limbs:   []    RUE     []    LUE     [x]    RLE    [x]    LLE   Compression: (10 Minutes):  Orest Scottie applied. Pt deferred bandaging. Measured for compression hose and recommend Mediven 20-30 mm Hg, size 3, open toe natural standard length. Waiting for MD prescription; refaxed request      Treatment/Session Assessment:    · Response to Treatment:  Pt in agreement with POC and goals. · Compliance with Program/Exercises:Good compliance  · Recommendations/Intent for next treatment session:  \"Next visit will focus on complete decongestive therapy and long term compression management  Total Treatment Duration:  45 minutes  OT Patient Time In/Time Out  Time In: 1100  Time Out: [address], OT

## 2018-04-12 NOTE — PROGRESS NOTES
Don Woods  : 1950  Primary: [facility]  Secondary: [address] at [facility]  [address], [facility], [address]  [phone]   [phone]         OUTPATIENT OCCUPATIONAL THERAPY: Daily Note 2018    ICD-10: Treatment Diagnosis: I89.0, lymphedema not elsewhere classified  G89.0, pain not elsewhere classified  M62.81, muscle weakness generalized  Precautions/Allergies:   Codeine and Tramadol   Fall Risk Score: 1 (? 5 = High Risk)  MD Orders: eval and treat MEDICAL/REFERRING DIAGNOSIS:   Localized edema [R60.0]   DATE OF ONSET:    REFERRING PHYSICIAN: Shaye Garcia MD  RETURN PHYSICIAN APPOINTMENT: to be determined      INITIAL ASSESSMENT:  Ms. Barbara Lynn presents with bilateral lower extremity 3+/5 pitting edema from her knees to her feet. She has history of THR in . She reports her swelling started in  and is not reversible. She has no tissue changes or history of cellulitis. 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 She is wearing 15-23 mmHg compression zipper socks daily at work. She reports tightness, limb heaviness, and pain. Pt is also diabetic with neuropathy in her hands and feet. She lives with her  and two adult children. She is here to manage her leg swelling. PLAN OF CARE:   PROBLEM LIST:  1. Edema/Girth  2. Decreased Knowledge of Precautions  3. Decreased Skin Integrity/Hygeine  4. Decreased [place] with Home Exercise Program INTERVENTIONS PLANNED  1. Skin care  2. Compression bandaging  3. Fitting for compression garment(s)  4. Manual therapy/Manual lymph drainage  5. Therapeutic exercise/Therapeutic activities  6. Patient Education  7. Compression pump trial prn  8.  kinesiotaping    TREATMENT PLAN:  Effective Dates: 3/12/18 TO 18.   Frequency/Duration:  2x a week for 90 days and upon reassessment will adjust frequency and duration as progress indicates. GOALS: (Goals have been discussed and agreed upon with patient.)  Short-Term Functional Goals: Time Frame: 45 days  1. The patient/caregiver will verbalize understanding of lymphedema precautions. 2. Patient will be independent with skin care regimen to decrease risk of cellulitis. 3. The patient/caregiver will be independent at donning and doffing  lower extremity compression bandages. 4. The patient/caregiver will be independent with self-manual lymph drainage techniques and show decrease in limb volume. 5. Patient will be independent in lymphatic exercises. Discharge Goals: Time Frame: 90 days  1. Patient's bilateral lower extremity circumferential measurements will decrease 5 to 10 cm on volumetric graph to maximize functional use in ADL's.    2. The patient/caregiver will be independent with home management of lymphedema. 3. Patient/caregiver will be independent donning and doffing bilateral lower extremity compression garment. Rehabilitation Potential For Stated Goals: Fair  Regarding Mary Huang's therapy, I certify that the treatment plan above will be carried out by a therapist or under their direction. Thank you for this referral,  Mikel Roger, OTR/L     Referring Physician Signature: Roman Agudelo MD _________________________  Date _________            The information in this section was collected on 4/9/2018   (except where otherwise noted). OCCUPATIONAL PROFILE & HISTORY:   History of Present Injury/Illness (Reason for Referral):    Ms. Андрей Salinas presents with bilateral lower extremity 3+/5 pitting edema from her knees to her feet. She has history of THR in 2015. She reports her swelling started in 2017 and is not reversible. She has no tissue changes or history of cellulitis. Ms. 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 She is wearing 15-23 mmHg compression zipper socks daily at work.   She reports tightness, limb heaviness, and pain. Pt is also diabetic with neuropathy in her hands and feet. She lives with her  and two adult children. She is here to manage her leg swelling. Past Medical History/Comorbidities:   Ms. Maxwell John  has a past medical history of Chronic musculoskeletal pain; Cystocele with rectocele; Essential hypertension (2/9/2016); GERD (gastroesophageal reflux disease); Hip fracture requiring operative repair ([facility] (1/9/2015); [facility] ([facility] (1/7/2015); Hyperlipidemia; Hypothyroidism; Impaired mobility and ADLs; Pure hypercholesterolemia (2/9/2016); and Type 2 diabetes mellitus without complication ([facility] (6/7/9770). Ms. Maxwell John  has a past surgical history that includes hx tonsillectomy; hx tubal ligation; hx dilation and curettage; hx colonoscopy; hx magali and bso; and hx hip fracture tx (Left). Social History/Living Environment:        Pt lives with her  and 2 adult children  Prior Level of Function/Work/Activity:  Pt works full time in a school [employer]. She is independent with ADLs/IADLs with difficulty donning and doffing socks and shoes due to s/p THR and limited hip flexion    Current Medications:    Current Outpatient Prescriptions:     TURMERIC, BULK,, by Does Not Apply route., Disp: , Rfl:     HUMALOG KWIKPEN 100 unit/mL kwikpen, INJECT 40  UNITS SUBCUTANEOUSLY AT BEDTIME, Disp: 15 Pen, Rfl: 11    ezetimibe (ZETIA) 10 mg tablet, Take 1 Tab by mouth daily. , Disp: 90 Tab, Rfl: 3    gabapentin (NEURONTIN) 600 mg tablet, TAKE ONE TABLET BY MOUTH THREE TIMES DAILY, Disp: 270 Tab, Rfl: 3    cyclobenzaprine (FLEXERIL) 10 mg tablet, Take 1 Tab by mouth daily. , Disp: 90 Tab, Rfl: 3    traMADol (ULTRAM) 50 mg tablet, TAKE ONE TABLET BY MOUTH TWICE DAILY AS NEEDED FOR 30 DAYS, Disp: 60 Tab, Rfl: 5    BD INSULIN PEN NEEDLE UF SHORT 31 gauge x 5/16\" ndle, USE  TWICE DAILY AS DIRECTED WITH  INSULIN  PENS, Disp: 100 Pen Needle, Rfl: [facility] 300 unit/mL (1.5 mL) inpn, INJECT 70  UNITS SUBCUTANEOUSLY ONCE DAILY WITH DINNER, Disp: 18 Pen, Rfl: 11    glyBURIDE-metFORMIN (GLUCOVANCE) 5-500 mg per tablet, Take 1 Tab by mouth Daily (before breakfast). Indications: type 2 diabetes mellitus, Disp: 90 Tab, Rfl: 3    atorvastatin (LIPITOR) 80 mg tablet, Take 1 Tab by mouth daily. Indications: hyperlipidemia, Disp: 90 Tab, Rfl: 3    oxybutynin chloride XL (DITROPAN XL) 10 mg CR tablet, Take 1 Tab by mouth daily. , Disp: 30 Tab, Rfl: 11    meloxicam (MOBIC) 15 mg tablet, Take 1 Tab by mouth daily. , Disp: 30 Tab, Rfl: 11    cyanocobalamin 1,000 mcg tablet, Take 1,000 mcg by mouth daily. , Disp: , Rfl:     levothyroxine (SYNTHROID) 100 mcg tablet, TAKE ONE TABLET BY MOUTH ONCE DAILY BEFORE BREAKFAST FOR HYPOTHYROIDISM, Disp: 90 Tab, Rfl: 3    ONETOUCH ULTRA TEST strip, USE TO TEST BLOOD SUGARS THREE TIMES A DAY, Disp: 300 Strip, Rfl: 11    ONETOUCH ULTRA TEST strip, TEST BLOOD SUGAR TWICE A   DAY, Disp: 200 Strip, Rfl: 2    CALCIUM CARB/VIT D3/MINERALS (CALCIUM-VITAMIN D PO), Take  by mouth., Disp: , Rfl:     MULTIVITAMIN PO, Take  by mouth., Disp: , Rfl:             Date Last Reviewed:  4/9/2018     Complexity Level : Expanded review of therapy/medical records (1-2):  MODERATE COMPLEXITY   ASSESSMENT OF OCCUPATIONAL PERFORMANCE:   Palpation:          Pitting 3+/5 lower extremity edema from knees to feet bilaterally  Skin Integrity:          Excessive dryness, otherwise no tissue changes  Sensation:  Neuropathy in feet  Functional Mobility:  Ambulates without assistive device; independent  Activities of Daily Living Mod A shoes and socks due to limited hip flexion; has sock aid and long shoe horn at home  Edema/Girth:  3+ and pitting    Right Left    Initial Most Recent Initial Most Recent   Lower  Extremity 137 cm   139 cm        PRETREATMENT AFFECTED LIMB(s): right lower extremity  left lower extremity      Date:  3/12/18 3/12/18        Right / Left Right Left         Groin   []      []           8 inches   []      []           4 inches   []      []         PoplitealSpace   []      [] 32.0 cm 33.0 cm         8 inches   []      [] 33.0 33.5         4 inches   []      [] 29.5 30.0         Ankle   []      [] 24.0 24.5         Instep   []      [] 18.5 18.0       Measurements are taken in centimeters:  2.54 cm = 1 inch                Physical Skills Involved:  1. Sensation  2. Pain (Chronic)  3. Edema  4. Skin Integrity  5. tightness/heaviness Cognitive Skills Affected (resulting in the inability to perform in a timely and safe manner):  1. N/A Psychosocial Skills Affected:  1. Habits/Routines   Number of elements that affect the Plan of Care: 3-5:  MODERATE COMPLEXITY   CLINICAL DECISION MAKING:   Outcome Measure: Tool Used: Lymphedema Life Impact Scale   Score:  Initial: 14 Most Recent: X (Date: -- )   Interpretation of Score: The Lymphedema Life Impact Scale (LLIS) is a validated instrument that measures the physical, functional, and psychosocial concerns pertinent to patients with extremity lymphedema. The Scale's questionnaire is administered to patients to gauge impairments, activity limitations, and participation restrictions resulting from their lymphedema. Score 0 1-13 14-26 27-40 41-54 55-67 68   Modifier CH CI CJ CK CL CM CN     ? Other PT/OT Primary Functional Limitations:     - CURRENT STATUS: CJ - 20%-39% impaired, limited or restricted    - GOAL STATUS: CI - 1%-19% impaired, limited or restricted    - D/C STATUS:  ---------------To be determined---------------       Medical Necessity:   · Patient is expected to demonstrate progress in reduction of edema to reduce risk for cellulitis. Reason for Services/Other Comments:  · Patient continues to require skilled intervention due to bilateral lower extremity lymphedema.      Use of outcome tool(s) and clinical judgement create a POC that gives a: Questionable prediction of patient's progress: MODERATE COMPLEXITY   TREATMENT:   (In addition to Assessment/Re-Assessment sessions the following treatments were rendered)  4/9/2018      Pre-treatment Symptoms/Complaints:  Pt with decrease of 13.5cm  R LE ans 9 cm L LE circumferencial volumetric measurements; stabilized over the weekend. Requested prescription for class 1 compression hose. Needs long term compression management garments  Pain: Initial:   Pain Intensity 1: 2  Post Session:  2   Occupational Therapy Treatments:    OT eval(X  ) OT eval was completed. Pt received information on lymphedema and risk reduction/self management practices as outlined by the National Lymphedema Network. Therapeutic Exercise (minutes):     HEP:  As above; handouts given to patient for all exercises. Manual Therapy:  60 minutes   MLD followed by skin care and 20 minute compression pump trial.          Manual Lymph Drainage:( 50 minutes)           Lymph Nodes:    Cervical Supraclavicular Axillary Abdominal Inguinal Popliteal Antecubital   RIGHT [x]     [x]     [x]     []     [x]     [x]     []       LEFT [x]     [x]     [x]     []     [x]     [x]     []          Anastamoses:   Axillo-axillary Inguino-inguinal Axillo-inguinal Inguino-axillary   ANTERIOR []     []     []     [x]       POSTERIOR []     []     []     []       RIGHT []     []     []     [x]       LEFT []     []     []     [x]         Limbs:   []    RUE     []    LUE     [x]    RLE    [x]    LLE   Compression: (10 Minutes):  Beryle Cohens applied. Pt deferred bandaging. Measured for compression hose and recommend Mediven 20-30 mm Hg, size 3, open toe natural standard length. Waiting for MD prescription; refaxed request      Treatment/Session Assessment:    · Response to Treatment:  Pt in agreement with POC and goals. · Compliance with Program/Exercises:Good compliance  · Recommendations/Intent for next treatment session:  \"Next visit will focus on complete decongestive therapy and long term compression management  Total Treatment Duration:  45 minutes  OT Patient Time In/Time Out  Time In: 0400  Time Out: [address], OT

## 2018-04-19 ENCOUNTER — HOSPITAL ENCOUNTER (OUTPATIENT)
Dept: PHYSICAL THERAPY | Age: 68
Discharge: HOME | End: 2018-04-19
Payer: COMMERCIAL

## 2018-04-19 PROCEDURE — 97140 MANUAL THERAPY 1/> REGIONS: CPT

## 2018-04-19 NOTE — PROGRESS NOTES
Cristopher Berkowitz  : 1950  Primary: [facility]  Secondary: [address] at [facility]  [address], [facility], [address]  [phone]   [phone]         OUTPATIENT OCCUPATIONAL THERAPY: Daily Note and Discharge 2018    ICD-10: Treatment Diagnosis: I89.0, lymphedema not elsewhere classified  G89.0, pain not elsewhere classified  M62.81, muscle weakness generalized  Precautions/Allergies:   Codeine and Tramadol   Fall Risk Score: 1 (? 5 = High Risk)  MD Orders: eval and treat MEDICAL/REFERRING DIAGNOSIS:   Localized edema [R60.0]   DATE OF ONSET:    REFERRING PHYSICIAN: Afshan Benavidez MD  RETURN PHYSICIAN APPOINTMENT: to be determined    DISCHARGE:  Ms. Lindajo Olszewski completed 7 lymphedema therapy treatment sessions. She has achieved her goals. Pt was educated on lymphatic pathophysiology, complete decongestive therapy, precautions and skin integrity management. She is independent with self MLD and donning and doffing class 1 compression knee highs for long term management. She purchased 20-30 mmHg Mediven Comfort stockings. Ms. Lindajo Olszewski had demonstrated reduction in circumferencial volumetric measurements of 13.5 cm R LE and 9.0 cm L LE. At discharge, she measures 9.0 cm reduction R LE and 3.5 cm reduction L LE; she has not been wearing compression and has been working in [employer] 8 hours a day. Pt feels she is able to manage her lymphedema with compression stockings, self MLD, elevation and skin care; her Lymphedema Life Impact Scale reduced from 20% to 39% impairment to 1% to 19% impairment or restriction due to lymphedema. Pt has received maximum benefits from therapy and is in agreement with discharge from therapy. INITIAL ASSESSMENT:  Ms. Lindajo Olszewski presents with bilateral lower extremity 3+/5 pitting edema from her knees to her feet. She has history of THR in .   She reports her swelling started in 2017 and is not reversible. She has no tissue changes or history of cellulitis. 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 She is wearing 15-23 mmHg compression zipper socks daily at work. She reports tightness, limb heaviness, and pain. Pt is also diabetic with neuropathy in her hands and feet. She lives with her  and two adult children. She is here to manage her leg swelling. PLAN OF CARE:   PROBLEM LIST:  1. Edema/Girth  2. Decreased Knowledge of Precautions  3. Decreased Skin Integrity/Hygeine  4. Decreased [place] with Home Exercise Program INTERVENTIONS PLANNED  1. Skin care  2. Compression bandaging  3. Fitting for compression garment(s)  4. Manual therapy/Manual lymph drainage  5. Therapeutic exercise/Therapeutic activities  6. Patient Education  7. Compression pump trial prn  8.  kinesiotaping    TREATMENT PLAN:  Effective Dates: 3/12/18 TO 6/9/18. Frequency/Duration:  2x a week for 90 days and upon reassessment will adjust frequency and duration as progress indicates. GOALS: (Goals have been discussed and agreed upon with patient.)  Short-Term Functional Goals: Time Frame: 45 days  1. The patient/caregiver will verbalize understanding of lymphedema precautions. Goal met  2. Patient will be independent with skin care regimen to decrease risk of cellulitis. Goal met  3. The patient/caregiver will be independent at donning and doffing  lower extremity compression bandages. Not met; pt declined bandaging  4. The patient/caregiver will be independent with self-manual lymph drainage techniques and show decrease in limb volume. Goal met  5. Patient will be independent in lymphatic exercises. Goal met  Discharge Goals: Time Frame: 90 days  1. Patient's bilateral lower extremity circumferential measurements will decrease 5 to 10 cm on volumetric graph to maximize functional use in ADL's. Goal met   2.  The patient/caregiver will be independent with home management of lymphedema. Goal met  3. Patient/caregiver will be independent donning and doffing bilateral lower extremity compression garment. Goal met    Thank you for this referral,  MARIA C Johnson/SREE                 The information in this section was collected on 4/19/2018   (except where otherwise noted). OCCUPATIONAL PROFILE & HISTORY:   History of Present Injury/Illness (Reason for Referral):    Ms. Lindajo Olszewski presents with bilateral lower extremity 3+/5 pitting edema from her knees to her feet. She has history of THR in 2015. She reports her swelling started in 2017 and is not reversible. She has no tissue changes or history of cellulitis. 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 She is wearing 15-23 mmHg compression zipper socks daily at work. She reports tightness, limb heaviness, and pain. Pt is also diabetic with neuropathy in her hands and feet. She lives with her  and two adult children. She is here to manage her leg swelling. Past Medical History/Comorbidities:   Ms. Lindajo Olszewski  has a past medical history of Chronic musculoskeletal pain; Cystocele with rectocele; Essential hypertension (2/9/2016); GERD (gastroesophageal reflux disease); Hip fracture requiring operative repair ([facility] [address] (1/9/2015); Hip fracture, left ([address] (1/7/2015); Hyperlipidemia; Hypothyroidism; Impaired mobility and ADLs; Pure hypercholesterolemia (2/9/2016); and Type 2 diabetes mellitus without complication ([address] (4/0/6962). Ms. Lindajo Olszewski  has a past surgical history that includes hx tonsillectomy; hx tubal ligation; hx dilation and curettage; hx colonoscopy; hx magali and bso; and hx hip fracture tx (Left). Social History/Living Environment:        Pt lives with her  and 2 adult children  Prior Level of Function/Work/Activity:  Pt works full time in a school [employer].   She is independent with ADLs/IADLs with difficulty donning and doffing socks and shoes due to s/p THR and limited hip flexion    Current Medications:    Current Outpatient Prescriptions:     TURMERIC, BULK,, by Does Not Apply route., Disp: , Rfl:     HUMALOG KWIKPEN 100 unit/mL kwikpen, INJECT 40  UNITS SUBCUTANEOUSLY AT BEDTIME, Disp: 15 Pen, Rfl: 11    ezetimibe (ZETIA) 10 mg tablet, Take 1 Tab by mouth daily. , Disp: 90 Tab, Rfl: 3    gabapentin (NEURONTIN) 600 mg tablet, TAKE ONE TABLET BY MOUTH THREE TIMES DAILY, Disp: 270 Tab, Rfl: 3    cyclobenzaprine (FLEXERIL) 10 mg tablet, Take 1 Tab by mouth daily. , Disp: 90 Tab, Rfl: 3    traMADol (ULTRAM) 50 mg tablet, TAKE ONE TABLET BY MOUTH TWICE DAILY AS NEEDED FOR 30 DAYS, Disp: 60 Tab, Rfl: 5    BD INSULIN PEN NEEDLE UF SHORT 31 gauge x 5/16\" ndle, USE  TWICE DAILY AS DIRECTED WITH  INSULIN  PENS, Disp: 100 Pen Needle, Rfl: 17    TOUJEO SOLOSTAR 300 unit/mL (1.5 mL) inpn, INJECT 70  UNITS SUBCUTANEOUSLY ONCE DAILY WITH DINNER, Disp: 18 Pen, Rfl: 11    glyBURIDE-metFORMIN (GLUCOVANCE) 5-500 mg per tablet, Take 1 Tab by mouth Daily (before breakfast). Indications: type 2 diabetes mellitus, Disp: 90 Tab, Rfl: 3    atorvastatin (LIPITOR) 80 mg tablet, Take 1 Tab by mouth daily. Indications: hyperlipidemia, Disp: 90 Tab, Rfl: 3    oxybutynin chloride XL (DITROPAN XL) 10 mg CR tablet, Take 1 Tab by mouth daily. , Disp: 30 Tab, Rfl: 11    meloxicam (MOBIC) 15 mg tablet, Take 1 Tab by mouth daily. , Disp: 30 Tab, Rfl: 11    cyanocobalamin 1,000 mcg tablet, Take 1,000 mcg by mouth daily. , Disp: , Rfl:     levothyroxine (SYNTHROID) 100 mcg tablet, TAKE ONE TABLET BY MOUTH ONCE DAILY BEFORE BREAKFAST FOR HYPOTHYROIDISM, Disp: 90 Tab, Rfl: 3    ONETOUCH ULTRA TEST strip, USE TO TEST BLOOD SUGARS THREE TIMES A DAY, Disp: 300 Strip, Rfl: 11    ONETOUCH ULTRA TEST strip, TEST BLOOD SUGAR TWICE A   DAY, Disp: 200 Strip, Rfl: 2    CALCIUM CARB/VIT D3/MINERALS (CALCIUM-VITAMIN D PO), Take  by mouth., Disp: , Rfl:     MULTIVITAMIN PO, Take  by mouth., Disp: , Rfl:             Date Last Reviewed:  4/19/2018     Complexity Level : Expanded review of therapy/medical records (1-2):  MODERATE COMPLEXITY   ASSESSMENT OF OCCUPATIONAL PERFORMANCE:   Palpation:          Pitting 3+/5 lower extremity edema from knees to feet bilaterally  Skin Integrity:          Excessive dryness, otherwise no tissue changes  Sensation:  Neuropathy in feet  Functional Mobility:  Ambulates without assistive device; independent  Activities of Daily Living Mod A shoes and socks due to limited hip flexion; has sock aid and long shoe horn at home  Edema/Girth:  3+ and pitting    Right Left    Initial Most Recent Initial Most Recent   Lower  Extremity 137 cm  128.0 cm  4/19/18 139 cm  136.5 cm  4/19/18      PRETREATMENT AFFECTED LIMB(s): right lower extremity  left lower extremity      Date:  3/12/18 3/12/18 4/19/18 4/19/18      Right / Left Right Left Right Left       Groin   []      []           8 inches   []      []           4 inches   []      []         PoplitealSpace   []      [] 32.0 cm 33.0 cm 31.0 cm 31.5 cm       8 inches   []      [] 33.0 33.5 30.0 32.5       4 inches   []      [] 29.5 30.0 26.0 30.0       Ankle   []      [] 24.0 24.5 23.0 24.5       Instep   []      [] 18.5 18.0 18.0 18.0     Measurements are taken in centimeters:  2.54 cm = 1 inch                Physical Skills Involved:  1. Sensation  2. Pain (Chronic)  3. Edema  4. Skin Integrity  5. tightness/heaviness Cognitive Skills Affected (resulting in the inability to perform in a timely and safe manner):  1. N/A Psychosocial Skills Affected:  1. Habits/Routines   Number of elements that affect the Plan of Care: 3-5:  MODERATE COMPLEXITY   CLINICAL DECISION MAKING:   Outcome Measure: Tool Used: Lymphedema Life Impact Scale   Score:  Initial: 14 Most Recent: 7 (Date: 4/19/18 )   Interpretation of Score:  The Lymphedema Life Impact Scale (LLIS) is a validated instrument that measures the physical, functional, and psychosocial concerns pertinent to patients with extremity lymphedema. The Scale's questionnaire is administered to patients to gauge impairments, activity limitations, and participation restrictions resulting from their lymphedema. Score 0 1-13 14-26 27-40 41-54 55-67 68   Modifier CH CI CJ CK CL CM CN     ? Other PT/OT Primary Functional Limitations:     - CURRENT STATUS: CJ - 20%-39% impaired, limited or restricted    - GOAL STATUS: CI - 1%-19% impaired, limited or restricted    - D/C STATUS:  CI - 1%-19% impaired, limited or restricted       Medical Necessity:   · Patient is expected to demonstrate progress in reduction of edema to reduce risk for cellulitis. Reason for Services/Other Comments:  · Patient continues to require skilled intervention due to bilateral lower extremity lymphedema. Use of outcome tool(s) and clinical judgement create a POC that gives a: Questionable prediction of patient's progress: MODERATE COMPLEXITY   TREATMENT:   (In addition to Assessment/Re-Assessment sessions the following treatments were rendered)  4/19/2018      Pre-treatment Symptoms/Complaints:  Pt with decrease of 9.0 cm  R LE and 3.5 cm L LE circumferencial volumetric measurements; stabilized over the weekend. Purchased   class 1 compression hose. Pain: Initial:   Pain Intensity 1: 2  Post Session:  2   Occupational Therapy Treatments:    OT eval(X  ) OT eval was completed. Pt received information on lymphedema and risk reduction/self management practices as outlined by the National Lymphedema Network. Therapeutic Exercise (minutes):     HEP:  As above; handouts given to patient for all exercises.   Manual Therapy:  60 minutes   MLD followed by skin care and 30 minute compression pump trial.          Manual Lymph Drainage:( 60 minutes)  Reviewed self MLD and muscle pumping exercises to facilitate lymph flow          Lymph Nodes:    Cervical Supraclavicular Axillary Abdominal Inguinal Popliteal Antecubital   RIGHT [x]     [x]     [x]     []     [x]     [x]     []       LEFT [x]     [x]     [x]     []     [x]     [x]     []          Anastamoses:   Axillo-axillary Inguino-inguinal Axillo-inguinal Inguino-axillary   ANTERIOR []     []     []     [x]       POSTERIOR []     []     []     []       RIGHT []     []     []     [x]       LEFT []     []     []     [x]         Limbs:   []    RUE     []    LUE     [x]    RLE    [x]    LLE   Compression: ( Minutes):  Pt received compression hose: Mediven 20-30 mm Hg, size 3, open toe natural standard length. She will don at home. Treatment/Session Assessment:    Response to Treatment: Pt feels she is able to manage her lymphedema with compression stockings, self MLD, elevation and skin care; her Lymphedema Life Impact Scale reduced from 20% to 39% impairment to 1% to 19% impairment or restriction due to lymphedema. Pt has received maximum benefits from therapy and is in agreement with discharge from therapy.   · Compliance with Program/Exercises:Good compliance  · Recommendations/Intent for next treatment session:  Discharge visit today  Total Treatment Duration:  60 minutes  OT Patient Time In/Time Out  Time In: 0400  Time Out: [address], OT

## 2018-06-12 PROBLEM — R53.83 OTHER FATIGUE: Status: ACTIVE | Noted: 2018-06-12

## 2018-06-12 PROBLEM — G62.9 PERIPHERAL NEUROPATHY: Status: ACTIVE | Noted: 2018-06-12

## 2018-06-12 PROBLEM — E55.9 VITAMIN D DEFICIENCY: Status: ACTIVE | Noted: 2018-06-12

## 2018-07-09 ENCOUNTER — APPOINTMENT (OUTPATIENT)
Dept: CT IMAGING | Age: 68
DRG: 065 | End: 2018-07-09
Attending: EMERGENCY MEDICINE
Payer: MEDICARE

## 2018-07-09 ENCOUNTER — APPOINTMENT (OUTPATIENT)
Dept: MRI IMAGING | Age: 68
DRG: 065 | End: 2018-07-09
Attending: INTERNAL MEDICINE
Payer: MEDICARE

## 2018-07-09 ENCOUNTER — APPOINTMENT (OUTPATIENT)
Dept: GENERAL RADIOLOGY | Age: 68
DRG: 065 | End: 2018-07-09
Attending: EMERGENCY MEDICINE
Payer: MEDICARE

## 2018-07-09 ENCOUNTER — APPOINTMENT (OUTPATIENT)
Dept: ULTRASOUND IMAGING | Age: 68
DRG: 065 | End: 2018-07-09
Attending: INTERNAL MEDICINE
Payer: MEDICARE

## 2018-07-09 ENCOUNTER — HOSPITAL ENCOUNTER (INPATIENT)
Age: 68
LOS: 2 days | Discharge: HOME HEALTH | DRG: 065 | End: 2018-07-11
Attending: EMERGENCY MEDICINE | Admitting: INTERNAL MEDICINE
Payer: MEDICARE

## 2018-07-09 ENCOUNTER — APPOINTMENT (OUTPATIENT)
Dept: CT IMAGING | Age: 68
DRG: 065 | End: 2018-07-09
Attending: PSYCHIATRY & NEUROLOGY
Payer: MEDICARE

## 2018-07-09 DIAGNOSIS — R47.1 DYSARTHRIA: Primary | ICD-10-CM

## 2018-07-09 DIAGNOSIS — N30.00 ACUTE CYSTITIS WITHOUT HEMATURIA: ICD-10-CM

## 2018-07-09 DIAGNOSIS — G45.9 TRANSIENT CEREBRAL ISCHEMIA, UNSPECIFIED TYPE: ICD-10-CM

## 2018-07-09 DIAGNOSIS — R73.9 HYPERGLYCEMIA: ICD-10-CM

## 2018-07-09 DIAGNOSIS — E11.9 TYPE 2 DIABETES MELLITUS WITHOUT COMPLICATION, WITH LONG-TERM CURRENT USE OF INSULIN (HCC): ICD-10-CM

## 2018-07-09 DIAGNOSIS — I63.02 CEREBROVASCULAR ACCIDENT (CVA) DUE TO THROMBOSIS OF BASILAR ARTERY (HCC): ICD-10-CM

## 2018-07-09 DIAGNOSIS — Z79.4 TYPE 2 DIABETES MELLITUS WITHOUT COMPLICATION, WITH LONG-TERM CURRENT USE OF INSULIN (HCC): ICD-10-CM

## 2018-07-09 PROBLEM — N39.0 UTI (URINARY TRACT INFECTION): Status: ACTIVE | Noted: 2018-07-09

## 2018-07-09 PROBLEM — I63.9 CVA (CEREBRAL VASCULAR ACCIDENT) (HCC): Status: ACTIVE | Noted: 2018-07-09

## 2018-07-09 PROBLEM — R47.81 SLURRED SPEECH: Status: ACTIVE | Noted: 2018-07-09

## 2018-07-09 LAB
ALBUMIN SERPL-MCNC: 3 G/DL (ref 3.2–4.6)
ALBUMIN/GLOB SERPL: 0.8 {RATIO} (ref 1.2–3.5)
ALP SERPL-CCNC: 120 U/L (ref 50–136)
ALT SERPL-CCNC: 58 U/L (ref 12–65)
AMPHET UR QL SCN: NEGATIVE
ANION GAP SERPL CALC-SCNC: 10 MMOL/L (ref 7–16)
AST SERPL-CCNC: 52 U/L (ref 15–37)
ATRIAL RATE: 83 BPM
BACTERIA URNS QL MICRO: ABNORMAL /HPF
BARBITURATES UR QL SCN: NEGATIVE
BASOPHILS # BLD: 0 K/UL (ref 0–0.2)
BASOPHILS NFR BLD: 0 % (ref 0–2)
BENZODIAZ UR QL: NEGATIVE
BILIRUB SERPL-MCNC: 0.6 MG/DL (ref 0.2–1.1)
BUN SERPL-MCNC: 14 MG/DL (ref 8–23)
CALCIUM SERPL-MCNC: 9.8 MG/DL (ref 8.3–10.4)
CALCULATED P AXIS, ECG09: 27 DEGREES
CALCULATED R AXIS, ECG10: -21 DEGREES
CALCULATED T AXIS, ECG11: 20 DEGREES
CANNABINOIDS UR QL SCN: NEGATIVE
CASTS URNS QL MICRO: 0 /LPF
CHLORIDE SERPL-SCNC: 105 MMOL/L (ref 98–107)
CO2 SERPL-SCNC: 26 MMOL/L (ref 21–32)
COCAINE UR QL SCN: NEGATIVE
CREAT SERPL-MCNC: 0.86 MG/DL (ref 0.6–1)
CRYSTALS URNS QL MICRO: 0 /LPF
DIAGNOSIS, 93000: NORMAL
DIFFERENTIAL METHOD BLD: ABNORMAL
EOSINOPHIL # BLD: 0.1 K/UL (ref 0–0.8)
EOSINOPHIL NFR BLD: 3 % (ref 0.5–7.8)
EPI CELLS #/AREA URNS HPF: 0 /HPF
ERYTHROCYTE [DISTWIDTH] IN BLOOD BY AUTOMATED COUNT: 15.4 % (ref 11.9–14.6)
ETHANOL SERPL-MCNC: <3 MG/DL
GLOBULIN SER CALC-MCNC: 3.9 G/DL (ref 2.3–3.5)
GLUCOSE BLD STRIP.AUTO-MCNC: 256 MG/DL (ref 65–100)
GLUCOSE BLD STRIP.AUTO-MCNC: 308 MG/DL (ref 65–100)
GLUCOSE BLD STRIP.AUTO-MCNC: 330 MG/DL (ref 65–100)
GLUCOSE BLD STRIP.AUTO-MCNC: 361 MG/DL (ref 65–100)
GLUCOSE SERPL-MCNC: 378 MG/DL (ref 65–100)
HCT VFR BLD AUTO: 32.3 % (ref 35.8–46.3)
HGB BLD-MCNC: 10.3 G/DL (ref 11.7–15.4)
IMM GRANULOCYTES # BLD: 0 K/UL (ref 0–0.5)
IMM GRANULOCYTES NFR BLD AUTO: 0 % (ref 0–5)
INR PPP: 1.1
LYMPHOCYTES # BLD: 1.2 K/UL (ref 0.5–4.6)
LYMPHOCYTES NFR BLD: 27 % (ref 13–44)
MCH RBC QN AUTO: 25.5 PG (ref 26.1–32.9)
MCHC RBC AUTO-ENTMCNC: 31.9 G/DL (ref 31.4–35)
MCV RBC AUTO: 80 FL (ref 79.6–97.8)
METHADONE UR QL: NEGATIVE
MONOCYTES # BLD: 0.6 K/UL (ref 0.1–1.3)
MONOCYTES NFR BLD: 12 % (ref 4–12)
MUCOUS THREADS URNS QL MICRO: 0 /LPF
NEUTS SEG # BLD: 2.6 K/UL (ref 1.7–8.2)
NEUTS SEG NFR BLD: 58 % (ref 43–78)
OPIATES UR QL: NEGATIVE
OTHER OBSERVATIONS,UCOM: ABNORMAL
P-R INTERVAL, ECG05: 158 MS
PCP UR QL: NEGATIVE
PLATELET # BLD AUTO: 122 K/UL (ref 150–450)
PMV BLD AUTO: 10 FL (ref 10.8–14.1)
POTASSIUM SERPL-SCNC: 4.1 MMOL/L (ref 3.5–5.1)
PROT SERPL-MCNC: 6.9 G/DL (ref 6.3–8.2)
PROTHROMBIN TIME: 14.1 SEC (ref 11.5–14.5)
Q-T INTERVAL, ECG07: 356 MS
QRS DURATION, ECG06: 82 MS
QTC CALCULATION (BEZET), ECG08: 418 MS
RBC # BLD AUTO: 4.04 M/UL (ref 4.05–5.25)
RBC #/AREA URNS HPF: 0 /HPF
SODIUM SERPL-SCNC: 141 MMOL/L (ref 136–145)
TROPONIN I SERPL-MCNC: <0.02 NG/ML (ref 0.02–0.05)
VENTRICULAR RATE, ECG03: 83 BPM
WBC # BLD AUTO: 4.5 K/UL (ref 4.3–11.1)
WBC URNS QL MICRO: ABNORMAL /HPF

## 2018-07-09 PROCEDURE — 84484 ASSAY OF TROPONIN QUANT: CPT | Performed by: EMERGENCY MEDICINE

## 2018-07-09 PROCEDURE — 82962 GLUCOSE BLOOD TEST: CPT

## 2018-07-09 PROCEDURE — 70450 CT HEAD/BRAIN W/O DYE: CPT

## 2018-07-09 PROCEDURE — 97161 PT EVAL LOW COMPLEX 20 MIN: CPT

## 2018-07-09 PROCEDURE — 65660000000 HC RM CCU STEPDOWN

## 2018-07-09 PROCEDURE — G8998 SWALLOW D/C STATUS: HCPCS

## 2018-07-09 PROCEDURE — 97530 THERAPEUTIC ACTIVITIES: CPT

## 2018-07-09 PROCEDURE — 70496 CT ANGIOGRAPHY HEAD: CPT

## 2018-07-09 PROCEDURE — 71045 X-RAY EXAM CHEST 1 VIEW: CPT

## 2018-07-09 PROCEDURE — 74011636637 HC RX REV CODE- 636/637: Performed by: INTERNAL MEDICINE

## 2018-07-09 PROCEDURE — 99218 HC RM OBSERVATION: CPT

## 2018-07-09 PROCEDURE — 70551 MRI BRAIN STEM W/O DYE: CPT

## 2018-07-09 PROCEDURE — 74011000258 HC RX REV CODE- 258: Performed by: EMERGENCY MEDICINE

## 2018-07-09 PROCEDURE — 96365 THER/PROPH/DIAG IV INF INIT: CPT | Performed by: EMERGENCY MEDICINE

## 2018-07-09 PROCEDURE — 85610 PROTHROMBIN TIME: CPT | Performed by: EMERGENCY MEDICINE

## 2018-07-09 PROCEDURE — 97165 OT EVAL LOW COMPLEX 30 MIN: CPT

## 2018-07-09 PROCEDURE — 99221 1ST HOSP IP/OBS SF/LOW 40: CPT | Performed by: PHYSICAL MEDICINE & REHABILITATION

## 2018-07-09 PROCEDURE — 74011000250 HC RX REV CODE- 250: Performed by: INTERNAL MEDICINE

## 2018-07-09 PROCEDURE — 77030020263 HC SOL INJ SOD CL0.9% LFCR 1000ML

## 2018-07-09 PROCEDURE — 87086 URINE CULTURE/COLONY COUNT: CPT | Performed by: EMERGENCY MEDICINE

## 2018-07-09 PROCEDURE — G8979 MOBILITY GOAL STATUS: HCPCS

## 2018-07-09 PROCEDURE — G8996 SWALLOW CURRENT STATUS: HCPCS

## 2018-07-09 PROCEDURE — 74011250637 HC RX REV CODE- 250/637: Performed by: INTERNAL MEDICINE

## 2018-07-09 PROCEDURE — 93880 EXTRACRANIAL BILAT STUDY: CPT

## 2018-07-09 PROCEDURE — 74011250637 HC RX REV CODE- 250/637: Performed by: EMERGENCY MEDICINE

## 2018-07-09 PROCEDURE — 80307 DRUG TEST PRSMV CHEM ANLYZR: CPT | Performed by: EMERGENCY MEDICINE

## 2018-07-09 PROCEDURE — 74011250636 HC RX REV CODE- 250/636: Performed by: INTERNAL MEDICINE

## 2018-07-09 PROCEDURE — 74011000258 HC RX REV CODE- 258: Performed by: INTERNAL MEDICINE

## 2018-07-09 PROCEDURE — 81015 MICROSCOPIC EXAM OF URINE: CPT | Performed by: EMERGENCY MEDICINE

## 2018-07-09 PROCEDURE — 96361 HYDRATE IV INFUSION ADD-ON: CPT | Performed by: EMERGENCY MEDICINE

## 2018-07-09 PROCEDURE — 85025 COMPLETE CBC W/AUTO DIFF WBC: CPT | Performed by: EMERGENCY MEDICINE

## 2018-07-09 PROCEDURE — G8997 SWALLOW GOAL STATUS: HCPCS

## 2018-07-09 PROCEDURE — 93005 ELECTROCARDIOGRAM TRACING: CPT | Performed by: EMERGENCY MEDICINE

## 2018-07-09 PROCEDURE — 87186 SC STD MICRODIL/AGAR DIL: CPT | Performed by: EMERGENCY MEDICINE

## 2018-07-09 PROCEDURE — 81003 URINALYSIS AUTO W/O SCOPE: CPT | Performed by: EMERGENCY MEDICINE

## 2018-07-09 PROCEDURE — 92610 EVALUATE SWALLOWING FUNCTION: CPT

## 2018-07-09 PROCEDURE — G8978 MOBILITY CURRENT STATUS: HCPCS

## 2018-07-09 PROCEDURE — 74011636320 HC RX REV CODE- 636/320: Performed by: INTERNAL MEDICINE

## 2018-07-09 PROCEDURE — 99285 EMERGENCY DEPT VISIT HI MDM: CPT | Performed by: EMERGENCY MEDICINE

## 2018-07-09 PROCEDURE — 80053 COMPREHEN METABOLIC PANEL: CPT | Performed by: EMERGENCY MEDICINE

## 2018-07-09 PROCEDURE — C8929 TTE W OR WO FOL WCON,DOPPLER: HCPCS

## 2018-07-09 PROCEDURE — 74011250636 HC RX REV CODE- 250/636: Performed by: EMERGENCY MEDICINE

## 2018-07-09 PROCEDURE — 82962 GLUCOSE BLOOD TEST: CPT | Performed by: INTERNAL MEDICINE

## 2018-07-09 PROCEDURE — 87088 URINE BACTERIA CULTURE: CPT | Performed by: EMERGENCY MEDICINE

## 2018-07-09 RX ORDER — INSULIN GLARGINE 100 [IU]/ML
20 INJECTION, SOLUTION SUBCUTANEOUS DAILY
Status: DISCONTINUED | OUTPATIENT
Start: 2018-07-09 | End: 2018-07-10

## 2018-07-09 RX ORDER — SODIUM CHLORIDE 0.9 % (FLUSH) 0.9 %
5-10 SYRINGE (ML) INJECTION EVERY 8 HOURS
Status: DISCONTINUED | OUTPATIENT
Start: 2018-07-09 | End: 2018-07-11 | Stop reason: HOSPADM

## 2018-07-09 RX ORDER — INSULIN GLARGINE 100 [IU]/ML
70 INJECTION, SOLUTION SUBCUTANEOUS
Status: DISCONTINUED | OUTPATIENT
Start: 2018-07-09 | End: 2018-07-09 | Stop reason: SDUPTHER

## 2018-07-09 RX ORDER — ATORVASTATIN CALCIUM 40 MG/1
80 TABLET, FILM COATED ORAL DAILY
Status: DISCONTINUED | OUTPATIENT
Start: 2018-07-09 | End: 2018-07-11 | Stop reason: HOSPADM

## 2018-07-09 RX ORDER — SODIUM CHLORIDE 0.9 % (FLUSH) 0.9 %
5-10 SYRINGE (ML) INJECTION AS NEEDED
Status: DISCONTINUED | OUTPATIENT
Start: 2018-07-09 | End: 2018-07-11 | Stop reason: HOSPADM

## 2018-07-09 RX ORDER — HEPARIN SODIUM 5000 [USP'U]/ML
5000 INJECTION, SOLUTION INTRAVENOUS; SUBCUTANEOUS EVERY 12 HOURS
Status: DISCONTINUED | OUTPATIENT
Start: 2018-07-09 | End: 2018-07-11 | Stop reason: HOSPADM

## 2018-07-09 RX ORDER — OXYBUTYNIN CHLORIDE 10 MG/1
10 TABLET, EXTENDED RELEASE ORAL DAILY
Status: DISCONTINUED | OUTPATIENT
Start: 2018-07-09 | End: 2018-07-11 | Stop reason: HOSPADM

## 2018-07-09 RX ORDER — GUAIFENESIN 100 MG/5ML
324 LIQUID (ML) ORAL
Status: COMPLETED | OUTPATIENT
Start: 2018-07-09 | End: 2018-07-09

## 2018-07-09 RX ORDER — BISACODYL 5 MG
5 TABLET, DELAYED RELEASE (ENTERIC COATED) ORAL DAILY PRN
Status: DISCONTINUED | OUTPATIENT
Start: 2018-07-09 | End: 2018-07-11 | Stop reason: HOSPADM

## 2018-07-09 RX ORDER — CLOPIDOGREL BISULFATE 75 MG/1
75 TABLET ORAL DAILY
Status: DISCONTINUED | OUTPATIENT
Start: 2018-07-09 | End: 2018-07-10

## 2018-07-09 RX ORDER — ACETAMINOPHEN 325 MG/1
650 TABLET ORAL
Status: DISCONTINUED | OUTPATIENT
Start: 2018-07-09 | End: 2018-07-11 | Stop reason: HOSPADM

## 2018-07-09 RX ORDER — INSULIN GLARGINE 100 [IU]/ML
70 INJECTION, SOLUTION SUBCUTANEOUS
Status: DISCONTINUED | OUTPATIENT
Start: 2018-07-09 | End: 2018-07-09

## 2018-07-09 RX ORDER — TRAMADOL HYDROCHLORIDE 50 MG/1
50 TABLET ORAL
Status: DISCONTINUED | OUTPATIENT
Start: 2018-07-09 | End: 2018-07-11 | Stop reason: HOSPADM

## 2018-07-09 RX ORDER — GABAPENTIN 300 MG/1
600 CAPSULE ORAL 3 TIMES DAILY
Status: DISCONTINUED | OUTPATIENT
Start: 2018-07-09 | End: 2018-07-11 | Stop reason: HOSPADM

## 2018-07-09 RX ORDER — PERPHENAZINE 16 MG
200 TABLET ORAL DAILY
COMMUNITY
End: 2020-04-01

## 2018-07-09 RX ORDER — INSULIN LISPRO 100 [IU]/ML
INJECTION, SOLUTION INTRAVENOUS; SUBCUTANEOUS
Status: DISCONTINUED | OUTPATIENT
Start: 2018-07-09 | End: 2018-07-11 | Stop reason: HOSPADM

## 2018-07-09 RX ORDER — SODIUM CHLORIDE 9 MG/ML
100 INJECTION, SOLUTION INTRAVENOUS CONTINUOUS
Status: DISPENSED | OUTPATIENT
Start: 2018-07-09 | End: 2018-07-10

## 2018-07-09 RX ORDER — ONDANSETRON 2 MG/ML
4 INJECTION INTRAMUSCULAR; INTRAVENOUS
Status: DISCONTINUED | OUTPATIENT
Start: 2018-07-09 | End: 2018-07-11 | Stop reason: HOSPADM

## 2018-07-09 RX ORDER — EZETIMIBE 10 MG/1
10 TABLET ORAL DAILY
Status: DISCONTINUED | OUTPATIENT
Start: 2018-07-09 | End: 2018-07-11 | Stop reason: HOSPADM

## 2018-07-09 RX ORDER — LEVOTHYROXINE SODIUM 100 UG/1
100 TABLET ORAL
Status: DISCONTINUED | OUTPATIENT
Start: 2018-07-09 | End: 2018-07-11 | Stop reason: HOSPADM

## 2018-07-09 RX ORDER — METOPROLOL TARTRATE 5 MG/5ML
5 INJECTION INTRAVENOUS
Status: DISCONTINUED | OUTPATIENT
Start: 2018-07-09 | End: 2018-07-11 | Stop reason: HOSPADM

## 2018-07-09 RX ORDER — HEPARIN SODIUM 5000 [USP'U]/ML
5000 INJECTION, SOLUTION INTRAVENOUS; SUBCUTANEOUS EVERY 8 HOURS
Status: DISCONTINUED | OUTPATIENT
Start: 2018-07-09 | End: 2018-07-09

## 2018-07-09 RX ORDER — ZINC GLUCONATE 10 MG
250 LOZENGE ORAL DAILY
COMMUNITY
End: 2020-04-01

## 2018-07-09 RX ORDER — CYCLOBENZAPRINE HCL 10 MG
10 TABLET ORAL DAILY
Status: DISCONTINUED | OUTPATIENT
Start: 2018-07-09 | End: 2018-07-11 | Stop reason: HOSPADM

## 2018-07-09 RX ORDER — INSULIN GLARGINE 100 [IU]/ML
50 INJECTION, SOLUTION SUBCUTANEOUS
Status: DISCONTINUED | OUTPATIENT
Start: 2018-07-09 | End: 2018-07-11 | Stop reason: HOSPADM

## 2018-07-09 RX ORDER — ASPIRIN 81 MG/1
81 TABLET ORAL DAILY
Status: DISCONTINUED | OUTPATIENT
Start: 2018-07-09 | End: 2018-07-11 | Stop reason: HOSPADM

## 2018-07-09 RX ORDER — SODIUM CHLORIDE 0.9 % (FLUSH) 0.9 %
10 SYRINGE (ML) INJECTION
Status: COMPLETED | OUTPATIENT
Start: 2018-07-09 | End: 2018-07-09

## 2018-07-09 RX ADMIN — INSULIN GLARGINE 50 UNITS: 100 INJECTION, SOLUTION SUBCUTANEOUS at 21:11

## 2018-07-09 RX ADMIN — Medication 10 ML: at 11:41

## 2018-07-09 RX ADMIN — SODIUM CHLORIDE 1000 ML: 900 INJECTION, SOLUTION INTRAVENOUS at 03:37

## 2018-07-09 RX ADMIN — Medication 10 ML: at 05:58

## 2018-07-09 RX ADMIN — GABAPENTIN 600 MG: 300 CAPSULE ORAL at 21:09

## 2018-07-09 RX ADMIN — HEPARIN SODIUM 5000 UNITS: 5000 INJECTION, SOLUTION INTRAVENOUS; SUBCUTANEOUS at 21:10

## 2018-07-09 RX ADMIN — INSULIN LISPRO 8 UNITS: 100 INJECTION, SOLUTION INTRAVENOUS; SUBCUTANEOUS at 16:30

## 2018-07-09 RX ADMIN — ASPIRIN 81 MG 324 MG: 81 TABLET ORAL at 04:30

## 2018-07-09 RX ADMIN — Medication 5 ML: at 21:15

## 2018-07-09 RX ADMIN — HEPARIN SODIUM 5000 UNITS: 5000 INJECTION, SOLUTION INTRAVENOUS; SUBCUTANEOUS at 06:25

## 2018-07-09 RX ADMIN — CEFTRIAXONE SODIUM: 1 INJECTION, POWDER, FOR SOLUTION INTRAMUSCULAR; INTRAVENOUS at 04:34

## 2018-07-09 RX ADMIN — SODIUM CHLORIDE 100 ML/HR: 900 INJECTION, SOLUTION INTRAVENOUS at 05:55

## 2018-07-09 RX ADMIN — PERFLUTREN 1 ML: 6.52 INJECTION, SUSPENSION INTRAVENOUS at 08:00

## 2018-07-09 RX ADMIN — ACETAMINOPHEN 650 MG: 325 TABLET ORAL at 21:10

## 2018-07-09 RX ADMIN — SODIUM CHLORIDE 100 ML: 900 INJECTION, SOLUTION INTRAVENOUS at 11:41

## 2018-07-09 RX ADMIN — IOPAMIDOL 80 ML: 755 INJECTION, SOLUTION INTRAVENOUS at 11:41

## 2018-07-09 RX ADMIN — INSULIN LISPRO 8 UNITS: 100 INJECTION, SOLUTION INTRAVENOUS; SUBCUTANEOUS at 21:11

## 2018-07-09 RX ADMIN — ASPIRIN 81 MG: 81 TABLET, COATED ORAL at 12:31

## 2018-07-09 RX ADMIN — LEVOTHYROXINE SODIUM 100 MCG: 100 TABLET ORAL at 06:24

## 2018-07-09 RX ADMIN — INSULIN GLARGINE 20 UNITS: 100 INJECTION, SOLUTION SUBCUTANEOUS at 10:00

## 2018-07-09 NOTE — PROGRESS NOTES
TRANSFER - IN REPORT:    Verbal report received from Pipe traore RN on Ralph Bailey  being received from ER for routine progression of care      Report consisted of patients Situation, Background, Assessment and   Recommendations(SBAR). Information from the following report(s) SBAR, ED Summary and MAR was reviewed with the receiving nurse. Opportunity for questions and clarification was provided. Assessment completed upon patients arrival to unit and care assumed.

## 2018-07-09 NOTE — PROGRESS NOTES
Problem: Dysphagia (Adult)  Goal: *Acute Goals and Plan of Care (Insert Text)  STG:  Pt will participate in a ST evaluation x1. OBSERVATION Speech language pathology: bedside swallow note: Initial Assessment    NAME/AGE/GENDER: Ada Nair is a 80 y.o. female  DATE: 7/9/2018  PRIMARY DIAGNOSIS: Slurred speech       ICD-10: Treatment Diagnosis: dysphagia, other 13.19  INTERDISCIPLINARY COLLABORATION: Registered Nurse and MD  PRECAUTIONS/ALLERGIES: Codeine and Tramadol  ASSESSMENT:Based on the objective data described below, Ms. Alston swallowing is essentially WFL. Pt reported having a hx of Bell's Palsy which affected her left side. She reported when she is tired it droops. No droop observed this date. Pt given trials thin liquids, pureed, mixed consistency and solids. Mastication WFL. No signs/sx aspiration observed. Min dysarthria observed evidenced by minimally decreased rate and precision. Will follow for ST evaluation. Patient will benefit from skilled intervention to address the below impairments. ?????? ? ? This section established at most recent assessment??????????  PROBLEM LIST (Impairments causing functional limitations):  1. Dysarthria   REHABILITATION POTENTIAL FOR STATED GOALS: Good  PLAN OF CARE:   Patient will benefit from skilled intervention to address the following impairments. RECOMMENDATIONS AND PLANNED INTERVENTIONS (Benefits and precautions of therapy have been discussed with the patient.):  · continue prescribed diet  MEDICATIONS:  · With liquid  COMPENSATORY STRATEGIES/MODIFICATIONS INCLUDING:  · None  OTHER RECOMMENDATIONS (including follow up treatment recommendations):   · ST evaluation  RECOMMENDED DIET MODIFICATIONS DISCUSSED WITH:  · Hospitalist  · Nursing  · Patient  FREQUENCY/DURATION: Continue to follow patient 3 times a week for duration of hospital stay to address above goals. RECOMMENDED REHABILITATION/EQUIPMENT: (at time of discharge pending progress): Due to the probability of continued deficits (see above) this patient will likely need continued skilled speech therapy after discharge. SUBJECTIVE:   Pt cooperative. History of Present Injury/Illness: Ms. Raleigh Nichole  has a past medical history of Chronic musculoskeletal pain; Cystocele with rectocele; Essential hypertension (2/9/2016); GERD (gastroesophageal reflux disease); Hip fracture requiring operative repair ([facility] [address] (1/9/2015); Hip fracture, left ([facility] [address] (1/7/2015); Hyperlipidemia; Hypothyroidism; Impaired mobility and ADLs; Pure hypercholesterolemia (2/9/2016); and Type 2 diabetes mellitus without complication ([facility] [address] (3/1/9600). .    She also  has a past surgical history that includes hx tonsillectomy; hx tubal ligation; hx dilation and curettage; hx colonoscopy; hx magali and bso; and hx hip fracture tx (Left). Present Symptoms: CVA   Pain Intensity 1: 0  Current Medications:   No current facility-administered medications on file prior to encounter. Current Outpatient Prescriptions on File Prior to Encounter   Medication Sig Dispense Refill    naproxen (NAPROSYN) 500 mg tablet Take 500 mg by mouth two (2) times daily (with meals).  gabapentin (NEURONTIN) 600 mg tablet TAKE ONE TABLET BY MOUTH THREE TIMES DAILY 270 Tab 3    HUMALOG KWIKPEN 100 unit/mL kwikpen INJECT 40  UNITS SUBCUTANEOUSLY AT BEDTIME 15 Pen 11    ezetimibe (ZETIA) 10 mg tablet Take 1 Tab by mouth daily. 90 Tab 3    cyclobenzaprine (FLEXERIL) 10 mg tablet Take 1 Tab by mouth daily. 90 Tab 3    traMADol (ULTRAM) 50 mg tablet TAKE ONE TABLET BY MOUTH TWICE DAILY AS NEEDED FOR 30 DAYS 60 Tab 5    glyBURIDE-metFORMIN (GLUCOVANCE) 5-500 mg per tablet Take 1 Tab by mouth Daily (before breakfast). Indications: type 2 diabetes mellitus 90 Tab 3    atorvastatin (LIPITOR) 80 mg tablet Take 1 Tab by mouth daily.  Indications: hyperlipidemia 90 Tab 3    levothyroxine (SYNTHROID) 100 mcg tablet TAKE ONE TABLET BY MOUTH ONCE DAILY BEFORE BREAKFAST FOR HYPOTHYROIDISM 90 Tab 3    MULTIVITAMIN PO Take  by mouth.  solifenacin (VESICARE) 10 mg tablet Take 10 mg by mouth daily.  ONETOUCH ULTRA BLUE TEST STRIP strip USE TO TEST BLOOD SUGARS THREE TIMES A  Strip 11    TURMERIC, BULK, by Does Not Apply route.  BD INSULIN PEN NEEDLE UF SHORT 31 gauge x 5/16\" ndle USE  TWICE DAILY AS DIRECTED WITH  INSULIN  PENS 100 Pen Needle 17    TOUJEO SOLOSTAR 300 unit/mL (1.5 mL) inpn INJECT 70  UNITS SUBCUTANEOUSLY ONCE DAILY WITH DINNER 18 Pen 11    oxybutynin chloride XL (DITROPAN XL) 10 mg CR tablet Take 1 Tab by mouth daily. 30 Tab 11    meloxicam (MOBIC) 15 mg tablet Take 1 Tab by mouth daily. 30 Tab 11    cyanocobalamin 1,000 mcg tablet Take 1,000 mcg by mouth daily.  ONETOUCH ULTRA TEST strip TEST BLOOD SUGAR TWICE A    Strip 2    CALCIUM CARB/VIT D3/MINERALS (CALCIUM-VITAMIN D PO) Take  by mouth. Current Dietary Status:  Regular     History of reflux:   Social History/Home Situation: with spouse, son and daughter   Home Environment: Private residence  One/Two Story Residence: One story  Living Alone: No  Support Systems: Spouse/Significant Other/Partner, Child(trey)  Patient Expects to be Discharged to[de-identified] Private residence  Current DME Used/Available at Home: None  OBJECTIVE:   Respiratory Status:  Room air     CXR Results: no acute abnormality  MRI/CT Results: Acute or subacute small right pontine infarct. Oral Motor Structure/Speech:  Oral-Motor Structure/Motor Speech  Labial: No impairment  Dentition: Natural (missing a few teeth)  Oral Hygiene: adequate  Lingual: No impairment    Cognitive and Communication Status:  Neurologic State: Alert  Orientation Level: Oriented X4  Cognition: Follows commands             BEDSIDE SWALLOW EVALUATION  Oral Assessment:  Oral Assessment  Labial: No impairment  Dentition: Natural (missing a few teeth)  Oral Hygiene: adequate  Lingual: No impairment  P.O.  Trials:  Patient Position: upright in bed    The patient was given teaspoon to straw amounts of the following:   Consistency Presented: Mixed consistency; Solid; Thin liquid  How Presented: Self-fed/presented;Cup/sip;Spoon;Straw;Successive swallows    ORAL PHASE:  Bolus Acceptance: No impairment  Bolus Formation/Control: No impairment  Propulsion: No impairment     Oral Residue: None    PHARYNGEAL PHASE:  Initiation of Swallow: No impairment  Laryngeal Elevation: Functional  Aspiration Signs/Symptoms: None  Vocal Quality: No impairment                OTHER OBSERVATIONS:  Rate/bite size: WNL   Endurance: WNL   Comments: Tool Used: Dysphagia Outcome and Severity Scale (MEHREEN)    Score Comments   Normal Diet  [] 7 With no strategies or extra time needed   Functional Swallow  [] 6 May have mild oral or pharyngeal delay       Mild Dysphagia    [] 5 Which may require one diet consistency restricted (those who demonstrate penetration which is entirely cleared on MBS would be included)   Mild-Moderate Dysphagia  [] 4 With 1-2 diet consistencies restricted       Moderate Dysphagia  [] 3 With 2 or more diet consistencies restricted       Moderately Severe Dysphagia  [] 2 With partial PO strategies (trials with ST only)       Severe Dysphagia  [] 1 With inability to tolerate any PO safely          Score:  Initial: 7 Most Recent: X (Date: -- )   Interpretation of Tool: The Dysphagia Outcome and Severity Scale (MEHREEN) is a simple, easy-to-use, 7-point scale developed to systematically rate the functional severity of dysphagia based on objective assessment and make recommendations for diet level, independence level, and type of nutrition. Score 7 6 5 4 3 2 1   Modifier CH CI CJ CK CL CM CN   ?  Swallowing:     - CURRENT STATUS: CH - 0% impaired, limited or restricted    - GOAL STATUS:  CH - 0% impaired, limited or restricted    - D/C STATUS:  CH - 0% impaired, limited or restricted  Payor: [employer] Harris / [address]: [address] / Product Type: Managed Care Medicare /     TREATMENT:    (In addition to Assessment/Re-Assessment sessions the following treatments were rendered)  Assessment/Reassessment only, no treatment provided today  MODALITIES:                                                                    ORAL MOTOR  EXERCISES:                                                                                                                                                                      LARYNGEAL / PHARYNGEAL EXERCISES:                                                                                                                                     __________________________________________________________________________________________________  Safety:   After treatment position/precautions:  · Upright in Bed  Treatment Assessment:   . Progression/Medical Necessity:   · Skilled intervention continues to be required due to medical complications. Compliance with Program/Exercises: Will assess as treatment progresses. Reason for Continuation of Services/Other Comments:  · Patient continues to require skilled intervention due to need for ST evaluation. Recommendations/Intent for next treatment session: \"Treatment next visit will focus on ST evaluation\".      Total Treatment Duration:  Time In: 6730  Time Out: [address], [address] CCC-SLP

## 2018-07-09 NOTE — IP AVS SNAPSHOT
[place] Ego 
 
 
 [address] [facility] 322 [facility] 
[phone] Patient: Guillermo Rasheed MRN: [id number] [id number] About your hospitalization You were admitted on:  July 9, 2018 You last received care in the:  [facility] 7 MED SURG You were discharged on:  July 11, 2018 Why you were hospitalized Your primary diagnosis was:  Slurred Speech Your diagnoses also included:  Type 2 Diabetes Mellitus Without Complication, With Long-Term Current Use Of Insulin (Hcc), Uti (Urinary Tract Infection), Cva (Cerebral Vascular Accident) ([facility]) Follow-up Information Follow up With Details Comments Contact Info [address]  They will contact you within 48 hours of discharge to schedule initial visit. [address] 230 [facility] [address] 
[phone] Deneen Longoria MD On 7/18/2018 10:40 am [address] 650 [facility] [address] 
[phone] Your Scheduled Appointments Wednesday July 18, 2018 10:40 AM EDT Office Visit with Deneen Longoria MD  
[address] ([address] [place] [address]) [address] 650 [facility] [address]  
[phone] Discharge Orders None A check monica indicates which time of day the medication should be taken. My Medications START taking these medications Instructions Each Dose to Equal  
 Morning Noon Evening Bedtime  
 aspirin delayed-release 81 mg tablet Start taking on:  7/12/2018 Your next dose is:  Tomorrow Morning Take 1 Tab by mouth daily. 81 mg  
    
  
   
   
   
  
 cephALEXin 500 mg capsule Commonly known as:  Sal Bipin Your next dose is: This evening Take 1 Cap by mouth two (2) times a day for 3 days. 500 mg Saccharomyces boulardii 250 mg capsule Commonly known as:  Rancho Controls Your next dose is: This evening Take 1 Cap by mouth two (2) times a day for 7 days. 250 mg CONTINUE taking these medications Instructions Each Dose to Equal  
 Morning Noon Evening Bedtime Alpha Lipoic Acid 600 mg Cap Your next dose is:  Tomorrow Morning Take 200 mg by mouth daily. 200 mg  
    
  
   
   
   
  
 atorvastatin 80 mg tablet Commonly known as:  LIPITOR Your next dose is:  Tomorrow Morning Take 1 Tab by mouth daily. Indications: hyperlipidemia 80 mg  
    
  
   
   
   
  
 BD ULTRA-FINE SHORT PEN NEEDLE 31 gauge x 5/16\" Ndle Generic drug:  Insulin Needles (Disposable) USE  TWICE DAILY AS DIRECTED WITH  INSULIN  PENS  
     
   
   
   
  
 CALCIUM-VITAMIN D PO Your next dose is:  Tomorrow Morning Take  by mouth.  
     
  
   
   
   
  
 cyanocobalamin 1,000 mcg tablet Your next dose is:  Tomorrow Morning Take 1,000 mcg by mouth daily. 1000 mcg  
    
  
   
   
   
  
 cyclobenzaprine 10 mg tablet Commonly known as:  FLEXERIL Your next dose is:  Tomorrow Morning Take 1 Tab by mouth daily. 10 mg  
    
  
   
   
   
  
 ezetimibe 10 mg tablet Commonly known as:  Glory Plant Your next dose is:  Tomorrow Morning Take 1 Tab by mouth daily. 10 mg  
    
  
   
   
   
  
 gabapentin 600 mg tablet Commonly known as:  NEURONTIN Your next dose is:  TODAY, evening and bedtime TAKE ONE TABLET BY MOUTH THREE TIMES DAILY  
     
  
   
   
  
   
  
  
 glyBURIDE-metFORMIN 5-500 mg per tablet Commonly known as:  Thamas Raker Your next dose is:  Tomorrow Morning Take 1 Tab by mouth Daily (before breakfast). Indications: type 2 diabetes mellitus 1 Tab HumaLOG KwikPen Insulin 100 unit/mL kwikpen Generic drug:  insulin lispro Your next dose is: Take tonight  INJECT 40  UNITS SUBCUTANEOUSLY AT BEDTIME  
     
   
   
   
  
  
 levothyroxine 100 mcg tablet Commonly known as:  SYNTHROID Your next dose is:  Tomorrow Morning TAKE ONE TABLET BY MOUTH ONCE DAILY BEFORE BREAKFAST FOR HYPOTHYROIDISM  
     
  
   
   
   
  
 magnesium 250 mg Tab Your next dose is:  Tomorrow Morning Take 250 mg by mouth daily. 250 mg  
    
  
   
   
   
  
 meloxicam 15 mg tablet Commonly known as:  MOBIC Your next dose is:  Tomorrow Morning Take 1 Tab by mouth daily. 15 mg MULTIVITAMIN PO Your next dose is:  Tomorrow Morning Take  by mouth. * ONETOUCH ULTRA TEST strip Generic drug:  glucose blood VI test strips TEST BLOOD SUGAR TWICE A   DAY  
     
   
   
   
  
 * ONETOUCH ULTRA BLUE TEST STRIP strip Generic drug:  glucose blood VI test strips USE TO TEST BLOOD SUGARS THREE TIMES A DAY  
     
   
   
   
  
 oxybutynin chloride XL 10 mg CR tablet Commonly known as:  DITROPAN XL Your next dose is:  Tomorrow Morning Take 1 Tab by mouth daily. 10 mg  
    
  
   
   
   
  
 TOUJEO SOLOSTAR U-300 INSULIN 300 unit/mL (1.5 mL) Inpn Generic drug:  insulin glargine Your next dose is: This evening INJECT 70  UNITS SUBCUTANEOUSLY ONCE DAILY WITH DINNER  
     
   
   
  
   
  
 traMADol 50 mg tablet Commonly known as:  ULTRAM  
Your next dose is: Take on as needed schedule TAKE ONE TABLET BY MOUTH TWICE DAILY AS NEEDED FOR 30 DAYS  
     
   
   
   
  
 TURMERIC (BULK) Your next dose is:  Resume home schedule 
  
   
 by Does Not Apply route. VESIcare 10 mg tablet Generic drug:  solifenacin Your next dose is:  Tomorrow Morning Take 10 mg by mouth daily. 10 mg  
    
  
   
   
   
  
 VISION FORMULA (WITH LUTEIN) tablet Generic drug:  vit a,c & e-lutein-minerals Your next dose is:  Tomorrow Morning Take 1 Tab by mouth daily. 1 Tab * Notice: This list has 2 medication(s) that are the same as other medications prescribed for you. Read the directions carefully, and ask your doctor or other care provider to review them with you. STOP taking these medications NAPROSYN 500 mg tablet Generic drug:  naproxen Where to Get Your Medications These medications were sent to [facility], [address] [facility] [address] Phone:  [phone] Saccharomyces boulardii 250 mg capsule Information on where to get these meds will be given to you by the nurse or doctor. ! Ask your nurse or doctor about these medications  
  aspirin delayed-release 81 mg tablet  
 cephALEXin 500 mg capsule Opioid Education Prescription Opioids: What You Need to Know: 
 
Prescription opioids can be used to help relieve moderate-to-severe pain and are often prescribed following a surgery or injury, or for certain health conditions. These medications can be an important part of treatment but also come with serious risks. Opioids are strong pain medicines. Examples include hydrocodone, oxycodone, fentanyl, and morphine. Heroin is an example of an illegal opioid. It is important to work with your health care provider to make sure you are getting the safest, most effective care. WHAT ARE THE RISKS AND SIDE EFFECTS OF OPIOID USE? Prescription opioids carry serious risks of addiction and overdose, especially with prolonged use. An opioid overdose, often marked by slow breathing, can cause sudden death. The use of prescription opioids can have a number of side effects as well, even when taken as directed. · Tolerance-meaning you might need to take more of a medication for the same pain relief · Physical dependence-meaning you have symptoms of withdrawal when the medication is stopped.   Withdrawal symptoms can include nausea, sweating, chills, diarrhea, stomach cramps, and muscle aches. Withdrawal can last up to several weeks, depending on which drug you took and how long you took it. · Increased sensitivity to pain · Constipation · Nausea, vomiting, and dry mouth · Sleepiness and dizziness · Confusion · Depression · Low levels of testosterone that can result in lower sex drive, energy, and strength · Itching and sweating RISKS ARE GREATER WITH:      
· History of drug misuse, substance use disorder, or overdose · Mental health conditions (such as depression or anxiety) · Sleep apnea · Older age (72 years or older) · Pregnancy Avoid alcohol while taking prescription opioids. Also, unless specifically advised by your health care provider, medications to avoid include: · Benzodiazepines (such as Xanax or Valium) · Muscle relaxants (such as Soma or Flexeril) · Hypnotics (such as Ambien or Lunesta) · Other prescription opioids KNOW YOUR OPTIONS Talk to your health care provider about ways to manage your pain that don't involve prescription opioids. Some of these options may actually work better and have fewer risks and side effects. Options may include: 
· Pain relievers such as acetaminophen, ibuprofen, and naproxen · Some medications that are also used for depression or seizures · Physical therapy and exercise · Counseling to help patients learn how to cope better with triggers of pain and stress. · Application of heat or cold compress · Massage therapy · Relaxation techniques Be Informed Make sure you know the name of your medication, how much and how often to take it, and its potential risks & side effects. IF YOU ARE PRESCRIBED OPIOIDS FOR PAIN: 
· Never take opioids in greater amounts or more often than prescribed. Remember the goal is not to be pain-free but to manage your pain at a tolerable level. · Follow up with your primary care provider to: · Work together to create a plan on how to manage your pain. · Talk about ways to help manage your pain that don't involve prescription opioids. · Talk about any and all concerns and side effects. · Help prevent misuse and abuse. · Never sell or share prescription opioids · Help prevent misuse and abuse. · Store prescription opioids in a secure place and out of reach of others (this may include visitors, children, friends, and family). · Safely dispose of unused/unwanted prescription opioids: Find your community drug take-back program or your pharmacy mail-back program, or flush them down the toilet, following guidance from the Food and Drug Administration (www.fda.gov/Drugs/ResourcesForYou). · Visit www.cdc.gov/drugoverdose to learn about the risks of opioid abuse and overdose. · If you believe you may be struggling with addiction, tell your health care provider and ask for guidance or call [employer] at 2-647-424-SXMR. Discharge Instructions Stroke: After Your Visit Your Care Instructions You have had a stroke. Risk factors for stroke include being overweight, smoking, and sedentary lifestyle. This means that the blood flow to a part of your brain was blocked for some time, which damages the nerve cells in that part of the brain. The part of your body controlled by that part of your brain may not function properly now. The brain is an amazing organ that can heal itself to some degree. The stroke you had damaged part of your brain, but other parts of your brain may take over in some way for the damaged areas. You have already started this process. Going home may be hard for you and your family. The more you can try to do for yourself, the better. Remember to take each day one at a time. Follow-up care is a key part of your treatment and safety.  Be sure to make and go to all appointments, and call your doctor if you are having problems. Its also a good idea to know your test results and keep a list of the medicines you take. How can you care for yourself at home? Enter a stroke rehabilitation (rehab) program, if your doctor recommends it. Physical, speech, and occupational therapies can help you manage bathing, dressing, eating, and other basics of daily living. Eat a heart-healthy diet that is low in cholesterol, saturated fat, and salt. Eat lots of fresh fruits and vegetables and foods high in fiber. Increase your activities slowly. Take short rest breaks when you get tired. Gradually increase the amount you walk. Start out by walking a little more than you did the day before. Do not drive until your doctor says it is okay. It is normal to feel sad or depressed after a stroke. If the blues last, talk to your doctor. If you are having problems with urine leakage, go to the bathroom at regular times, including when you first wake up and at bedtime. Also, limit fluids after dinner. If you are constipated, drink plenty of fluids, enough so that your urine is light yellow or clear like water. If you have kidney, heart, or liver disease and have to limit fluids, talk with your doctor before you increase the amount of fluids you drink. Set up a regular time for using the toilet. If you continue to have constipation, your doctor may suggest using a bulking agent, such as Metamucil, or a stool softener, laxative, or enema. Medicines Take your medicines exactly as prescribed. Call your doctor if you think you are having a problem with your medicine. You may be taking several medicines. ACE (angiotensin-converting enzyme) inhibitors, angiotensin II receptor blockers (ARBs), beta-blockers, diuretics (water pills), and calcium channel blockers control your blood pressure. Statins help lower cholesterol.  Your doctor may also prescribe medicines for depression, pain, sleep problems, anxiety, or agitation. If your doctor has given you medicine that prevents blood clots, such as warfarin (Coumadin), aspirin combined with extended-release dipyridamole (Aggrenox), clopidogrel (Plavix), or aspirin to prevent another stroke, you should: 
Tell your dentist, pharmacist, and other health professionals that you take these medicines. Watch for unusual bruising or bleeding, such as blood in your urine, red or black stools, or bleeding from your nose or gums. Get regular blood tests to check your clotting time if you are taking Coumadin. Wear medical alert jewelry that says you take blood thinners. You can buy this at most drugsAscenta Therapeuticses. Do not take any over-the-counter medicines or herbal products without talking to your doctor first. 
If you take birth control pills or hormone replacement therapy, talk to your doctor about whether they are right for you. For family members and caregivers Make the home safe. Set up a room so that your loved one does not have to climb stairs. Be sure the bathroom is on the same floor. Move throw rugs and furniture that could cause falls, and make sure that the lighting is good. Put grab bars and seats in tubs and showers. Find out what your loved one can do and what he or she needs help with. Try not to do things for your loved one that your loved one can do on his or her own. Help him or her learn and practice new skills. Visit and talk with your loved one often. Try doing activities together that you both enjoy, such as playing cards or board games. Keep in touch with your loved one's friends as much as you can, and encourage them to visit. Take care of yourself. Do not try to do everything yourself. Ask other family members to help. Eat well, get enough rest, and take time to do things that you enjoy. Keep up with your own doctor visits, and make sure to take your medicines regularly. Get out of the house as much as you can. Join a local support group. Find out if you qualify for home health care visits to help with rehab or for adult day care. When should you call for help? Call 911 anytime you think you may need emergency care. For example, call if: 
You have signs of another stroke. These may include: 
Sudden numbness, paralysis, or weakness in your face, arm, or leg, especially on only one side of your body. New problems with walking or balance. Sudden vision changes. Drooling or slurred speech. New problems speaking or understanding simple statements, or you feel confused. A sudden, severe headache that is different from past headaches. Call 911 even if these symptoms go away in a few minutes. You cough up blood. You vomit blood or what looks like coffee grounds. You pass maroon or very bloody stools. Call your doctor now or seek immediate medical care if: 
You have new bruises or blood spots under your skin. You have a nosebleed. Your gums bleed when you brush your teeth. You have blood in your urine. Your stools are black and tarlike or have streaks of blood. You have vaginal bleeding when you are not having your period, or heavy period bleeding. You have new symptoms that may be related to your stroke, such as falls or trouble swallowing. Watch closely for changes in your health, and be sure to contact your doctor if you have any problems. Where can you learn more? Go to Comprehend Systems.be Enter U375  in the search box to learn more about \"Stroke: After Your Visit\". © 5165-5589 Healthwise, Incorporated. Care instructions adapted under license by Shena Myles (which disclaims liability or warranty for this information).  This care instruction is for use with your licensed healthcare professional. If you have questions about a medical condition or this instruction, always ask your healthcare professional. [employer] disclaims any warranty or liability for your use of this information. DISCHARGE SUMMARY from Nurse PATIENT INSTRUCTIONS: 
 
 
F-face looks uneven A-arms unable to move or move unevenly S-speech slurred or non-existent T-time-call 911 as soon as signs and symptoms begin-DO NOT go Back to bed or wait to see if you get better-TIME IS BRAIN. Warning Signs of HEART ATTACK Call 911 if you have these symptoms: 
? Chest discomfort. Most heart attacks involve discomfort in the center of the chest that lasts more than a few minutes, or that goes away and comes back. It can feel like uncomfortable pressure, squeezing, fullness, or pain. ? Discomfort in other areas of the upper body. Symptoms can include pain or discomfort in one or both arms, the back, neck, jaw, or stomach. ? Shortness of breath with or without chest discomfort. ? Other signs may include breaking out in a cold sweat, nausea, or lightheadedness. Don't wait more than five minutes to call [address]! Fast action can save your life. Calling 911 is almost always the fastest way to get lifesaving treatment. Emergency Medical Services staff can begin treatment when they arrive  up to an hour sooner than if someone gets to the hospital by car. The discharge information has been reviewed with the patient. The patient verbalized understanding. Discharge medications reviewed with the patient and appropriate educational materials and side effects teaching were provided. ___________________________________________________________________________________________________________________________________ MyChart Announcement  We are excited to announce that we are making your provider's discharge notes available to you in [employer]. You will see these notes when they are completed and signed by the physician that discharged you from your recent hospital stay. If you have any questions or concerns about any information you see in [employer], please call the Health Information Department where you were seen or reach out to your Primary Care Provider for more information about your plan of care. Introducing [facility]! [address] introduces [employer] patient portal. Now you can access parts of your medical record, email your doctor's office, and request medication refills online. 1. In your internet browser, go to [email or web address] 2. Click on the First Time User? Click Here link in the Sign In box. You will see the New Member Sign Up page. 3. Enter your [employer] Access Code exactly as it appears below. You will not need to use this code after youve completed the sign-up process. If you do not sign up before the expiration date, you must request a new code. · [employer] Access Code: [email or web address] Expires: 9/10/2018 11:45 AM 
 
4. Enter the last four digits of your Social Security Number (xxxx) and Date of Birth (mm/dd/yyyy) as indicated and click Submit. You will be taken to the next sign-up page. 5. Create a [employer] ID. This will be your [employer] login ID and cannot be changed, so think of one that is secure and easy to remember. 6. Create a [employer] password. You can change your password at any time. 7. Enter your Password Reset Question and Answer. This can be used at a later time if you forget your password. 8. Enter your e-mail address. You will receive e-mail notification when new information is available in [address] 9. Click Sign Up. You can now view and download portions of your medical record. 10. Click the Download Summary menu link to download a portable copy of your medical information. If you have questions, please visit the Frequently Asked Questions section of the MyChart website. Remember, [employer] is NOT to be used for urgent needs. For medical emergencies, dial 911. Now available from your iPhone and Android! Introducing Juni Hanson As a [address] [place] [address] patient, I wanted to make you aware of our electronic visit tool called Juni Hanson. [facility] [address] [address] [facility] allows you to connect within minutes with a medical provider 24 hours a day, seven days a week via a mobile device or tablet or logging into a secure website from your computer. You can access Juni Hanson from anywhere in the [place]. A virtual visit might be right for you when you have a simple condition and feel like you just dont want to get out of bed, or cant get away from work for an appointment, when your regular [address] [place] [address] provider is not available (evenings, weekends or holidays), or when youre out of town and need minor care. Electronic visits cost only $49 and if the [facility] [address] 24[facility]7 provider determines a prescription is needed to treat your condition, one can be electronically transmitted to a nearby pharmacy*. Please take a moment to enroll today if you have not already done so. The enrollment process is free and takes just a few minutes. To enroll, please download the [facility] jeni to your tablet or phone, or visit [email or web address] to enroll on your computer. And, as an [address] [place] [address] patient with a [employer]In account, the results of your visits will be scanned into your electronic medical record and your primary care provider will be able to view the scanned results. We urge you to continue to see your regular [address] [place] [address] provider for your ongoing medical care.   And while your primary care provider may not be the one available when you seek a Juni Hanson virtual visit, the peace of mind you get from getting a real diagnosis real time can be priceless. For more information on Juni Hanson, view our Frequently Asked Questions (FAQs) at [email or web address]. Sincerely, 
 
Joseph Tadeo MD 
Chief Medical Officer [address] *:  certain medications cannot be prescribed via Juni Hanson Providers Seen During Your Hospitalization Provider Specialty Primary office phone [facility] MD Emergency Medicine [phone] Jez Black MD Internal Medicine [phone] Your Primary Care Physician (PCP) Primary Care Physician Office Phone Office Fax Penny Valle [phone] You are allergic to the following Allergen Reactions Codeine Other (comments) lightheadedness Tramadol Unknown (comments) HEADACHE, ONLY IF TAKEN AT NIGHT Recent Documentation Height Weight BMI OB Status Smoking Status 1.702 m 70.3 kg 24.28 kg/m2 Postmenopausal Never Smoker Emergency Contacts Name Discharge Info Relation Home Work Mobile Neris Pedraza  Spouse [3] [phone] Patient Belongings The following personal items are in your possession at time of discharge: 
     Visual Aid: Glasses             Clothing: At bedside Please provide this summary of care documentation to your next provider. Signatures-by signing, you are acknowledging that this After Visit Summary has been reviewed with you and you have received a copy. Patient Signature:  ____________________________________________________________ Date:  ____________________________________________________________  
  
Elsy Areli Provider Signature:  ____________________________________________________________ Date:  ____________________________________________________________

## 2018-07-09 NOTE — PROGRESS NOTES
[facility] [place] - INPATIENT  Face to Face Encounter    Patients Name: Roxanne Bazzi    YOB: 1950    Ordering Physician: Dr. Girma Talley    Primary Diagnosis: Slurred speech  CVA (cerebral vascular accident) [facility])    Date of Face to Face:   7/9/2018                                  Face to Face Encounter findings are related to primary reason for home care:   yes. 1. I certify that the patient needs intermittent care as follows: physical therapy: strengthening, stretching/ROM, transfer training, gait/stair training, balance training and pt/caregiver education    2. I certify that this patient is homebound, that is: 1) patient requires the use of a walker device, special transportation, or assistance of another to leave the home; or 2) patient's condition makes leaving the home medically contraindicated; and 3) patient has a normal inability to leave the home and leaving the home requires considerable and taxing effort. Patient may leave the home for infrequent and short duration for medical reasons, and occasional absences for non-medical reasons. Homebound status is due to the following functional limitations: Patient with strength deficits limiting the performance of all ADL's without caregiver assistance or the use of an assistive device. Patient with poor safety awareness and is at risk for falls without assistance of another person and the use of an assistive device. Patient with poor ambulation endurance limiting their safe ability to ascend/descend the required number of steps to leave the home. 3. I certify that this patient is under my care and that I, or a nurse practitioner or  [id number], or clinical nurse specialist, or certified nurse midwife, working with me, had a Face-to-Face Encounter that meets the physician Face-to-Face Encounter requirements.   The following are the clinical findings from the [address] [place] [address] encounter that support the need for skilled services and is a summary of the encounter: see hospital chart    See hospital [facility]  7/9/2018      THE FOLLOWING TO BE COMPLETED BY THE COMMUNITY PHYSICIAN:    I concur with the findings described above from the F2F encounter that this patient is homebound and in need of a skilled service.     Certifying Physician: _____________________________________      Printed Certifying Physician Name: _____________________________________    Date: _________________

## 2018-07-09 NOTE — PROGRESS NOTES
During IDT rounds on this date, team discussed plan of care and discharge needs with patient and daughter. Both patient and daughter are in agreement for her to return home with [facility] for PT at discharge. PT recommends patient ambulate with a rolling walker, and patient informs she has one at home already. No other anticipated needs at this time. CM will remain available. Patient is expected to discharge between 1-2 days. Care Management Interventions  PCP Verified by CM: Yes  Mode of Transport at Discharge:  Other (see comment) (family)  Transition of Care Consult (CM Consult): [address]: Yes  Discharge Durable Medical Equipment: No (Patient already has a rolling walker)  Physical Therapy Consult: Yes  Occupational Therapy Consult: Yes  Speech Therapy Consult: Yes  Confirm Follow Up Transport: Family  Plan discussed with Pt/Family/Caregiver: Yes  Freedom of Choice Offered: Yes  Discharge Location  Discharge Placement: Home with home health

## 2018-07-09 NOTE — CONSULTS
Physical Medicine & Rehabilitation Note-consult    Patient: Lora Mcgrath MRN: [id number]  SSN: [id number]    YOB: 1950  Age: 80 y.o. Sex: female      Admit Date: 7/9/2018  Admitting Physician: Julio Cesar Abad MD    Medical Decision Making/Plan/Recommend: Functional deficit, mobility, gait impairment. Mild left sided motor deficits, impaired control. Mild functional deficits, mobility, gait imapirement. Continue acute PT, OT. Continue gait training, transfer training, balance activities, exercises to improve strength and balance. Rehab plans discussed with patient. Patient requesting home discharge. Will follow rehab course to facilitate safe home discharge. Thank you for the opportunity to participate in the care of this patient. Chief Complaint : Gait dysfunction secondary to below. Admit Diagnosis: Slurred speech  acute or subacute small right pontine infarct. Type 2 diabetes mellitus without complication, with long-term current use of insulin ([address] (4/6/2017)  UTI (urinary tract infection) (7/9/2018)  weakness  Pain  DVT risk  Acute Rehab Dx:  Mild left sided weakness, impaired control.    Dysarthria  Aphasia  Mobility and ambulation deficits  Self Care/ADL deficits    Medical Dx:  Past Medical History:   Diagnosis Date    Chronic musculoskeletal pain     Cystocele with rectocele     H/O    Essential hypertension 2/9/2016    GERD (gastroesophageal reflux disease)     diet controlled     Hip fracture requiring operative repair ([address] 1/9/2015    H/O LFT HIP REPAIR    Hip fracture, left ([address] 1/7/2015    Hyperlipidemia     Hypothyroidism     Impaired mobility and ADLs     Pure hypercholesterolemia 2/9/2016    Type 2 diabetes mellitus without complication ([address] 5/4/0554     Subjective:     Date of Evaluation:  July 9, 2018    HPI: Lora Mcgrath is a 80 y.o. female patient at [facility] who was admitted on 7/9/2018  by Julio Cesar Abad MD with below mentioned medical history ,is being seen for Physical Medicine and Rehabilitation consult. Shruthi Romero with history of DM2, hypertension, hypercholesterolemia, presented with acute onset slurred speech and mild gait disturbance. MRI showed an acute or subacute small right pontine infarct. Patient also had serum glucose level over 350, as well as UTI. Patient was admitted with diagnosis of acute CVA and started on medical management for acute stroke and secondary prevention. She is started on iv rocephin for UTI, insulin coverage for hyperglycemia. Patient has started to participate in therapies with acute PT, OT and ST. Patient appears to have trace right UE weakness, mild right sided dysmetria,, impaired control of LUE, LLE.  limiting her mobility, ambulation and ADLs. Patient is managing her gait with contact guard assist. We are consulted to assist with rehab needs and placement. Shruthi Romero is seen and examined today. Medical Records reviewed. Patient states she was independent with all activities prior to admission but bad mild unsteadiness to her gait. Current Level of Function:   bed mobility - SBA, transfers - SBA, decreased balance , ambulation - 48' with RW and min A.       Prior Level of Function/Work/Activity:  Independent, living with  and daughter, taking care of sick , no falls but often feels \"unsteady\" when walking    Family History   Problem Relation Age of Onset    Cancer Mother     Cancer Father     Breast Cancer Neg Hx       Social History   Substance Use Topics    Smoking status: Never Smoker    Smokeless tobacco: Never Used    Alcohol use No     Past Surgical History:   Procedure Laterality Date    HX COLONOSCOPY      HX DILATION AND CURETTAGE      HX HIP FRACTURE TX Left     HX MT AND BSO      HX TONSILLECTOMY      HX TUBAL LIGATION        Prior to Admission medications    Medication Sig Start Date End Date Taking? Authorizing Provider   vit a,c & e-lutein-minerals (VISION FORMULA, WITH LUTEIN,) tablet Take 1 Tab by mouth daily. Yes Historical Provider   magnesium 250 mg tab Take 250 mg by mouth daily. Yes Historical Provider   Alpha Lipoic Acid 600 mg cap Take 200 mg by mouth daily. Yes Historical Provider   naproxen (NAPROSYN) 500 mg tablet Take 500 mg by mouth two (2) times daily (with meals). Yes Historical Provider   gabapentin (NEURONTIN) 600 mg tablet TAKE ONE TABLET BY MOUTH THREE TIMES DAILY 6/12/18  Yes Nataly Camacoh MD   HUMALOG KWIKPEN 100 unit/mL kwikpen INJECT 40  UNITS SUBCUTANEOUSLY AT BEDTIME 2/6/18  Yes Lauren Trevino MD   ezetimibe (ZETIA) 10 mg tablet Take 1 Tab by mouth daily. 1/22/18  Yes Lauren Trevino MD   cyclobenzaprine (FLEXERIL) 10 mg tablet Take 1 Tab by mouth daily. 1/22/18  Yes Lauren Trevino MD   traMADol Tania Grater) 50 mg tablet TAKE ONE TABLET BY MOUTH TWICE DAILY AS NEEDED FOR 30 DAYS 1/22/18  Yes Lauren Trevino MD   glyBURIDE-metFORMIN (GLUCOVANCE) 5-500 mg per tablet Take 1 Tab by mouth Daily (before breakfast). Indications: type 2 diabetes mellitus 11/8/17  Yes Lauren Trevino MD   atorvastatin (LIPITOR) 80 mg tablet Take 1 Tab by mouth daily. Indications: hyperlipidemia 11/8/17  Yes Lauren Trevino MD   levothyroxine (SYNTHROID) 100 mcg tablet TAKE ONE TABLET BY MOUTH ONCE DAILY BEFORE BREAKFAST FOR HYPOTHYROIDISM 7/17/17  Yes Lauren Trevino MD   MULTIVITAMIN PO Take  by mouth. Yes Historical Provider   solifenacin (VESICARE) 10 mg tablet Take 10 mg by mouth daily. Historical Provider   [address] TEST STRIP strip USE TO TEST BLOOD SUGARS THREE TIMES A DAY 4/30/18   MD ALEE Hoover, BULK, by Does Not Apply route.     Historical Provider   BD INSULIN PEN NEEDLE UF SHORT 31 gauge x 5/16\" ndle USE  TWICE DAILY AS DIRECTED WITH  INSULIN  PENS 1/2/18   MD LAUREL HooverOSTAR 300 unit/mL (1.5 mL) inpn INJECT 70  UNITS SUBCUTANEOUSLY ONCE DAILY WITH DINNER 18   Jaycob Quiles MD   oxybutynin chloride XL (DITROPAN XL) 10 mg CR tablet Take 1 Tab by mouth daily. 17   Jaycob Quiles MD   meloxicam (MOBIC) 15 mg tablet Take 1 Tab by mouth daily. 17   Jaycob Quiles MD   cyanocobalamin 1,000 mcg tablet Take 1,000 mcg by mouth daily. Historical Provider   [facility] ULTRA TEST strip TEST BLOOD SUGAR TWICE A   DAY 16   Jaycob Quiles MD   CALCIUM CARB/VIT D3/MINERALS (CALCIUM-VITAMIN D PO) Take  by mouth. Historical Provider     Allergies   Allergen Reactions    Codeine Other (comments)     lightheadedness    Tramadol Unknown (comments)     HEADACHE, ONLY IF TAKEN AT NIGHT         Review of Systems: Denies chest pain, shortness of breath, cough, headache, visual problems, abdominal pain, dysurea, calf pain. Pertinent positives are as noted in the medical records and unremarkable otherwise. Objective:     Vitals:  Blood pressure 146/75, pulse 67, temperature 97.6 °F (36.4 °C), resp. rate 20, height 5' 7\" (1.702 m), weight 155 lb (70.3 kg), SpO2 96 %. Temp (24hrs), Av.2 °F (36.8 °C), Min:97.6 °F (36.4 °C), Max:98.8 °F (37.1 °C)    BMI (calculated): 24.3 (18 0133)   Intake and Output:       Physical Exam:   General: Alert and age appropriately oriented. No acute cardio respiratory distress. HEENT: Normocephalic,no scleral icterus  Oral mucosa moist without cyanosis, No bruit, No JVD. Lungs: Clear to auscultation  bilaterally. Respiration even and unlabored   Heart: Regular rate and rhythm, S1, S2   No  murmurs, clicks, rub or gallops   Abdomen: Soft, non-tender, nondistended. Bowel sounds present. No organomegaly. Genitourinary: defered   Neuromuscular:      PERRL, EOMI, no nystagmus  Follows simple commands consistently. Able to identify, recall repeat.   Mood appropriate.   + mild dysarthria  LUE     Shoulder abduction   5/5              Elbow flexion:   5/5               Wrist extension:  5/5              Finger flexion;   5/5   ADMQ:  5 /5  RUE    Shoulder abduction: 5 /5                Elbow flexion:   5/5    Wrist extension: 5 /5   Finger flexion:   /5   ADMQ:  5 /5  LLE     Hip flexion:  5 /5              Knee extension:  5 /5    Ankle dorsiflexion:   5/5   Ankle plantarflexion:   5/5        RLE     Hip flexion:  5 /5   Knee extension:  5 /5    Ankle dorsiflexion: 5  /5   Ankle plantarflexion:  5 /5  Sensory - intact  + mild dysmetria ; left  + FTN, + HTS  No atrophy, no fasciculations, no tremors. Skin/extremity: No rashes, no erythema. Calf non tender BLE. Labs/Studies:  Recent Results (from the past 72 hour(s))   PROTHROMBIN TIME + INR    Collection Time: 07/09/18  1:36 AM   Result Value Ref Range    Prothrombin time 14.1 11.5 - 14.5 sec    INR 1.1     GLUCOSE, POC    Collection Time: 07/09/18  1:38 AM   Result Value Ref Range    Glucose (POC) 361 (H) 65 - 100 mg/dL   CBC WITH AUTOMATED DIFF    Collection Time: 07/09/18  1:45 AM   Result Value Ref Range    WBC 4.5 4.3 - 11.1 K/uL    RBC 4.04 (L) 4.05 - 5.25 M/uL    HGB 10.3 (L) 11.7 - 15.4 g/dL    HCT 32.3 (L) 35.8 - 46.3 %    MCV 80.0 79.6 - 97.8 FL    MCH 25.5 (L) 26.1 - 32.9 PG    MCHC 31.9 31.4 - 35.0 g/dL    RDW 15.4 (H) 11.9 - 14.6 %    PLATELET 471 (L) 031 - 450 K/uL    MPV 10.0 (L) 10.8 - 14.1 FL    DF AUTOMATED      NEUTROPHILS 58 43 - 78 %    LYMPHOCYTES 27 13 - 44 %    MONOCYTES 12 4.0 - 12.0 %    EOSINOPHILS 3 0.5 - 7.8 %    BASOPHILS 0 0.0 - 2.0 %    IMMATURE GRANULOCYTES 0 0.0 - 5.0 %    ABS. NEUTROPHILS 2.6 1.7 - 8.2 K/UL    ABS. LYMPHOCYTES 1.2 0.5 - 4.6 K/UL    ABS. MONOCYTES 0.6 0.1 - 1.3 K/UL    ABS. EOSINOPHILS 0.1 0.0 - 0.8 K/UL    ABS. BASOPHILS 0.0 0.0 - 0.2 K/UL    ABS. IMM.  GRANS. 0.0 0.0 - 0.5 K/UL   METABOLIC PANEL, COMPREHENSIVE    Collection Time: 07/09/18  1:45 AM   Result Value Ref Range    Sodium 141 136 - 145 mmol/L    Potassium 4.1 3.5 - 5.1 mmol/L    Chloride 105 98 - 107 mmol/L    CO2 26 21 - 32 mmol/L    Anion gap 10 7 - 16 mmol/L    Glucose 378 (H) 65 - 100 mg/dL    BUN 14 8 - 23 MG/DL    Creatinine 0.86 0.6 - 1.0 MG/DL    GFR est AA >60 >60 ml/min/1.73m2    GFR est non-AA >60 >60 ml/min/1.73m2    Calcium 9.8 8.3 - 10.4 MG/DL    Bilirubin, total 0.6 0.2 - 1.1 MG/DL    ALT (SGPT) 58 12 - 65 U/L    AST (SGOT) 52 (H) 15 - 37 U/L    Alk. phosphatase 120 50 - 136 U/L    Protein, total 6.9 6.3 - 8.2 g/dL    Albumin 3.0 (L) 3.2 - 4.6 g/dL    Globulin 3.9 (H) 2.3 - 3.5 g/dL    A-G Ratio 0.8 (L) 1.2 - 3.5     TROPONIN I    Collection Time: 07/09/18  1:45 AM   Result Value Ref Range    Troponin-I, Qt. <0.02 (L) 0.02 - 0.05 NG/ML   ETHYL ALCOHOL    Collection Time: 07/09/18  1:45 AM   Result Value Ref Range    ALCOHOL(ETHYL),SERUM <3 MG/DL   EKG, 12 LEAD, INITIAL    Collection Time: 07/09/18  1:45 AM   Result Value Ref Range    Ventricular Rate 83 BPM    Atrial Rate 83 BPM    P-R Interval 158 ms    QRS Duration 82 ms    Q-T Interval 356 ms    QTC Calculation (Bezet) 418 ms    Calculated P Axis 27 degrees    Calculated R Axis -21 degrees    Calculated T Axis 20 degrees    Diagnosis       !! AGE AND GENDER SPECIFIC ECG ANALYSIS !!   Normal sinus rhythm  Minimal voltage criteria for LVH, may be normal variant  Inferior infarct , age undetermined  Abnormal ECG  When compared with ECG of 07-JAN-2015 11:03,  Inferior infarct is now Present  Confirmed by Caitlin Lam [id number]) on 7/9/2018 6:59:31 AM     URINE MICROSCOPIC    Collection Time: 07/09/18  2:12 AM   Result Value Ref Range    WBC 10-20 0 /hpf    RBC 0 0 /hpf    Epithelial cells 0 0 /hpf    Bacteria 4+ (H) 0 /hpf    Casts 0 0 /lpf    Crystals, urine 0 0 /LPF    Mucus 0 0 /lpf    Other observations RESULTS VERIFIED MANUALLY     DRUG SCREEN, URINE    Collection Time: 07/09/18  2:12 AM   Result Value Ref Range    PCP(PHENCYCLIDINE) NEGATIVE       BENZODIAZEPINES NEGATIVE       COCAINE NEGATIVE AMPHETAMINES NEGATIVE       METHADONE NEGATIVE       THC (TH-CANNABINOL) NEGATIVE       OPIATES NEGATIVE       BARBITURATES NEGATIVE      CULTURE, URINE    Collection Time: 07/09/18  2:12 AM   Result Value Ref Range    Special Requests: NO SPECIAL REQUESTS      Culture result:        NO GROWTH AFTER SHORT PERIOD OF INCUBATION. FURTHER RESULTS TO FOLLOW AFTER OVERNIGHT INCUBATION.    GLUCOSE, POC    Collection Time: 07/09/18  4:46 AM   Result Value Ref Range    Glucose (POC) 256 (H) 65 - 100 mg/dL       Functional Assessment:  Reviewed participation and progress in therapies  Bed Mobility:  Rolling: Stand-by assistance  Supine to Sit: Stand-by assistance  Sit to Supine: Stand-by assistance  Scooting: Stand-by assistance  Wheelchair Mobility:  Transfers:  Sit to Stand: Stand-by assistance  Stand to Sit: Stand-by assistance  Gait:  Base of Support: Narrowed  Speed/Rocio: Fluctuations  Step Length: Left shortened;Right shortened  Gait Abnormalities: Path deviations;Trunk sway increased;Decreased step clearance  Distance (ft): 50 Feet (ft)  Assistive Device: Other (comment) (none)  Ambulation - Level of Assistance: Contact guard assistance      Ambulation:       Impression/Plan:     Principal Problem:    Slurred speech (7/9/2018)    Active Problems:    Type 2 diabetes mellitus without complication, with long-term current use of insulin (HCC) (4/6/2017)      UTI (urinary tract infection) (7/9/2018)        Current Facility-Administered Medications   Medication Dose Route Frequency Provider Last Rate Last Dose    atorvastatin (LIPITOR) tablet 80 mg  80 mg Oral DAILY Ying Silvestre MD        cyclobenzaprine (FLEXERIL) tablet 10 mg  10 mg Oral DAILY Ying Silvestre MD        ezetimibe (ZETIA) tablet 10 mg  10 mg Oral DAILY Ying Silvestre MD        gabapentin (NEURONTIN) capsule 600 mg  600 mg Oral TID Ying Silvestre MD        levothyroxine (SYNTHROID) tablet 100 mcg  100 mcg Oral SATISH Hussein Meryle Oyster, MD   100 mcg at 07/09/18 4530    oxybutynin chloride XL (DITROPAN XL) tablet 10 mg  10 mg Oral DAILY Rhina Montalvo MD        traMADol [place]) tablet 50 mg  50 mg Oral Q12H PRN Rhina Montalvo MD        sodium chloride (NS) flush 5-10 mL  5-10 mL IntraVENous Q8H Rhina Montalvo MD   10 mL at 07/09/18 0558    sodium chloride (NS) flush 5-10 mL  5-10 mL IntraVENous PRN Rhina Montalvo MD        0.9% sodium chloride infusion  100 mL/hr IntraVENous CONTINUOUS Rhina Montalvo  mL/hr at 07/09/18 0555 100 mL/hr at 07/09/18 0555    ondansetron (ZOFRAN) injection 4 mg  4 mg IntraVENous Q6H PRN Rhina Montalvo MD        acetaminophen (TYLENOL) tablet 650 mg  650 mg Oral Q4H PRN Rhina Montalvo MD        clopidogrel (PLAVIX) tablet 75 mg  75 mg Oral DAILY Rhina Montalvo MD        bisacodyl (DULCOLAX) tablet 5 mg  5 mg Oral DAILY PRN Rhina Montalvo MD        insulin lispro (HUMALOG) injection   SubCUTAneous AC&HS Rhina Montalvo MD        metoprolol (LOPRESSOR) injection 5 mg  5 mg IntraVENous Q6H PRN Rhina Montalvo MD        [START ON 7/10/2018] cefTRIAXone (ROCEPHIN) 1 g in 0.9% sodium chloride (MBP/ADV) 50 mL  1 g IntraVENous Q24H Rhina Montalvo MD        insulin glargine (LANTUS) injection 70 Units  70 Units SubCUTAneous QHS Rhina Montalvo MD        heparin (porcine) injection 5,000 Units  5,000 Units SubCUTAneous Q12H Cam Dugan MD                   Thank you for the opportunity to participate in the care of this patient.     Signed By: Reba Aldrich MD     July 9, 2018

## 2018-07-09 NOTE — PROGRESS NOTES
Problem: Self Care Deficits Care Plan (Adult)  Goal: *Acute Goals and Plan of Care (Insert Text)  1. Patient will feed self entire meal independently. 2. Patient will complete total body dressing and bathing with modified independence and adaptive equipment as needed. 3. Patient will participate in BUE therapeutic exercises to increase strength in LUE to at least 4+/5 for participation in ADLs and functional transfers. 4. Patient will participate in [address] [place] [address] therapeutic activities to increase coordination in LUE to [facility] for bimanual fine motor ADLs. 5. Patient will complete functional transfers with modified independence and adaptive equipment as needed. Timeframe: 7 visits       OCCUPATIONAL THERAPY: Initial Assessment, Daily Note and Treatment Day: 1st 7/9/2018  INPATIENT: Hospital Day: 1  Payor: Nallely Colon / Plan: [address] [place] [address] HMO / Product Type: Managed Care Medicare /      NAME/AGE/GENDER: Bello Morejon is a 80 y.o. female   PRIMARY DIAGNOSIS:  Slurred speech  CVA (cerebral vascular accident) ([facility] [address] Slurred speech Slurred speech        ICD-10: Treatment Diagnosis:    · Other lack of cordination (R27.8)   Precautions/Allergies:     Codeine and Tramadol      ASSESSMENT:     Ms. David Ma is a 80year old female admitted with slurred speech and UTI. MRI shows R pontine infarct. At baseline patient lives with her . She is typically independent with all ADLs, IADLs. She recently retired. She is R hand dominant. Patient supine in bed upon arrival, agreeable to OT evaluation. Reports chronic pain 2/10 in her left hip. Alert and oriented. BUE assessment reveals ROM, strength, coordination WFL to RUE. LUE strength 4/5 and impaired coordination. 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 Patient also reports chronic lymphedema to LLE and noted to have 2+ pitting edema to LLE compared to RLE.  Patient demonstrates bed mobility with supervision and intact sitting balance at edge of bed. Treatment initiated to include sit to stand with CGA and functional mobility in the hallway with minimal assistance due to decreased balance. Patient reports she has noticed decreasing balance recently. May benefit from assistive device for safety. Patient is currently functioning below her baseline due to decreased balance and LUE deficits. Will plan to follow for acute OT to increase independence and safety. This section established at most recent assessment   PROBLEM LIST (Impairments causing functional limitations):  1. Decreased Strength  2. Decreased ADL/Functional Activities  3. Decreased Transfer Abilities  4. Decreased Ambulation Ability/Technique  5. Decreased Balance  6. Increased Pain  7. Decreased Activity Tolerance  8. Decreased coordination   INTERVENTIONS PLANNED: (Benefits and precautions of occupational therapy have been discussed with the patient.)  1. Activities of daily living training  2. Adaptive equipment training  3. Group therapy  4. Neuromuscular re-eduation  5. Therapeutic activity  6. Therapeutic exercise  7. Patient education     TREATMENT PLAN: Frequency/Duration: Follow patient 3x/ week to address above goals. Rehabilitation Potential For Stated Goals: Good     RECOMMENDED REHABILITATION/EQUIPMENT: (at time of discharge pending progress): Due to the probability of continued deficits (see above) this patient will likely need continued skilled occupational therapy after discharge. Equipment:    Continue to assess              OCCUPATIONAL PROFILE AND HISTORY:   History of Present Injury/Illness (Reason for Referral):  Per H&P, \"Perla Seymour is a 80 y.o. white female, with a pm of Bell's Palsy and insulin dependent type 2 diabetes, who presents to the ED for sudden onset of slurred speech around dinnertime yesterday evening.  states she also had some mild gait disturbance. Speech a little improved since then but still unclear and with difficult speaking clearly. CT head WO in the ED negative for acute abnormality. She takes baby aspirin and lipitor at home. She denies chest pain, sob, nausea, diarrhea. UA shows possible UTI and her blood sugar is >350 on admission. She states she missed some insulin dosing yesterday. \"  Past Medical History/Comorbidities:   Ms. Kei Jane  has a past medical history of Chronic musculoskeletal pain; Cystocele with rectocele; Essential hypertension (2/9/2016); GERD (gastroesophageal reflux disease); Hip fracture requiring operative repair ([facility] (1/9/2015); Hip fracture, left ([facility] (1/7/2015); Hyperlipidemia; Hypothyroidism; Impaired mobility and ADLs; Pure hypercholesterolemia (2/9/2016); and Type 2 diabetes mellitus without complication ([facility] (8/6/0248). Ms. Kei Jane  has a past surgical history that includes hx tonsillectomy; hx tubal ligation; hx dilation and curettage; hx colonoscopy; hx magali and bso; and hx hip fracture tx (Left). Social History/Living Environment:   Home Environment: Private residence  # Steps to Enter: 2  Rails to Enter: No  One/Two Story Residence: One story  Living Alone: No  Support Systems: Spouse/Significant Other/Partner, Family member(s)  Patient Expects to be Discharged to[de-identified] Private residence  Current DME Used/Available at Home: Walker, rolling  Tub or Shower Type: Tub/Shower combination  Prior Level of Function/Work/Activity:  Patient lives with her  and daughter. She is typically independent. She cooks and does laundry. Drives. No recent falls. Dominant Side:         RIGHT  Personal Factors:          Sex:  female        Age:  80 y.o.         Past/Current Experience:  Recently retired from [employer]    Number of Personal Factors/Comorbidities that affect the Plan of Care: Brief history (0):  LOW COMPLEXITY   ASSESSMENT OF OCCUPATIONAL PERFORMANCE[de-identified]   Activities of Daily Living:   Basic ADLs (From Assessment) Complex ADLs (From Assessment)   Feeding: Minimum assistance  Oral Facial Hygiene/Grooming: Minimum assistance  Bathing: Minimum assistance  Upper Body Dressing: Minimum assistance  Lower Body Dressing: Minimum assistance  Toileting: Minimum assistance Instrumental ADL  Meal Preparation: Maximum assistance  Homemaking: Maximum assistance  Medication Management: Maximum assistance   Grooming/Bathing/Dressing Activities of Daily Living     Cognitive Retraining  Safety/Judgement: Awareness of environment; Fall prevention                       Bed/Mat Mobility  Rolling: Supervision  Supine to Sit: Supervision  Sit to Supine: Supervision  Sit to Stand: Contact guard assistance  Scooting: Stand-by assistance       Most Recent Physical Functioning:   Gross Assessment:  AROM: Within functional limits (BUE)  Strength: Generally decreased, functional (RUE 4+/5, LUE 4/5)  Coordination: Grossly decreased, non-functional (LUE)  Tone: Normal  Sensation: Impaired (neuropathy in B hands)               Posture:     Balance:  Sitting: Intact  Sitting - Static: Good (unsupported)  Sitting - Dynamic: Fair (occasional)  Standing: Impaired  Standing - Static: Fair  Standing - Dynamic : Fair Bed Mobility:  Rolling: Supervision  Supine to Sit: Supervision  Sit to Supine: Supervision  Scooting: Stand-by assistance  Wheelchair Mobility:     Transfers:  Sit to Stand: Contact guard assistance  Stand to Sit: Contact guard assistance            Patient Vitals for the past 6 hrs:   BP BP Patient Position SpO2 Pulse   07/09/18 1315 147/76 At rest 95 % 82       Mental Status  Neurologic State: Alert  Orientation Level: Oriented X4  Cognition: Follows commands  Perception: Appears intact  Perseveration: No perseveration noted  Safety/Judgement: Awareness of environment, Fall prevention                          Physical Skills Involved:  1. Balance  2. Strength  3. Activity Tolerance  4. Sensation  5. Fine Motor Control Cognitive Skills Affected (resulting in the inability to perform in a timely and safe manner):  1.  None Psychosocial Skills Affected:  1. Habits/Routines  2. Environmental Adaptation  3. Self-Awareness  4. Social Roles   Number of elements that affect the Plan of Care: 5+:  HIGH COMPLEXITY   CLINICAL DECISION MAKIN[address] [place] [address] AM-PAC 6 Clicks   Daily Activity Inpatient Short Form  How much help from another person does the patient currently need. .. Total A Lot A Little None   1. Putting on and taking off regular lower body clothing? [] 1   [] 2   [x] 3   [] 4   2. Bathing (including washing, rinsing, drying)? [] 1   [] 2   [x] 3   [] 4   3. Toileting, which includes using toilet, bedpan or urinal?   [] 1   [] 2   [x] 3   [] 4   4. Putting on and taking off regular upper body clothing? [] 1   [] 2   [x] 3   [] 4   5. Taking care of personal grooming such as brushing teeth? [] 1   [] 2   [x] 3   [] 4   6. Eating meals? [] 1   [] 2   [x] 3   [] 4   © , Trustees of [address] [place] [address], under license to [employer]. All rights reserved      Score:  Initial: 18 Most Recent: X (Date: -- )    Interpretation of Tool:  Represents activities that are increasingly more difficult (i.e. Bed mobility, Transfers, Gait). Score 24 23 22-20 19-15 14-10 9-7 6     Modifier CH CI CJ CK CL CM CN      ? Self Care:     - CURRENT STATUS: CK - 40%-59% impaired, limited or restricted    - GOAL STATUS: CJ - 20%-39% impaired, limited or restricted    - D/C STATUS:  ---------------To be determined---------------  Payor: Blanca Tran / Plan: [address] [place] [address] HMO / Product Type: Managed Care Medicare /      Medical Necessity:     · Patient demonstrates good rehab potential due to higher previous functional level. Reason for Services/Other Comments:  · Patient continues to require present interventions due to patient's inability to care for self at baseline level of function.    Use of outcome tool(s) and clinical judgement create a POC that gives a: MODERATE COMPLEXITY         TREATMENT:   (In addition to Assessment/Re-Assessment sessions the following treatments were rendered)     Pre-treatment Symptoms/Complaints:    Pain: Initial:   Pain Intensity 1: 2  Pain Location 1: Hip  Post Session:  same     Therapeutic Activity: (    8 minutes): Therapeutic activities including Bed transfers and Ambulation on level ground to improve mobility, strength, balance and activity tolerance for ADLs. Garay Junk Required minimal   to promote dynamic balance in standing. Braces/Orthotics/Lines/Etc:   · IV  · O2 Device: Room air  Treatment/Session Assessment:    · Response to Treatment:  Tolerated well. · Interdisciplinary Collaboration:   o Occupational Therapist  o Registered Nurse  · After treatment position/precautions:   o Supine in bed  o Bed/Chair-wheels locked  o Bed in low position  o Call light within reach   · Compliance with Program/Exercises: Will assess as treatment progresses. · Recommendations/Intent for next treatment session: \"Next visit will focus on advancements to more challenging activities and reduction in assistance provided\".   Total Treatment Duration:  OT Patient Time In/Time Out  Time In: 1255  Time Out: MARIA C Forte/SREE

## 2018-07-09 NOTE — CONSULTS
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 Occasionally this pattern of infarction may occur due to a propagating thrombus in the basilar artery and very occasionally can be embolic. She has uncontrolled DM and is at risk for many potential stroke etiologies. With onset of symptoms 6-7 hours prior to presentation the patient was not a candidate for TPA and has a relatively low stroke severity. However urgent CTA is indicated now to exclude basilar thrombus. Plan:    Continue ASA Plavix DAP for now    CTA head and neck STAT    Echo, monitor for afib    Continue Lipitor    Manage hyperglycemia    Chief complaint slurred speech    The patient is a 28-year-old female with long-standing and poorly controlled type 2 diabetes who developed onset of slurred speech and gait imbalance on July 8 at approximately 1700 hrs. The patient did not immediately seek medical care but only came to the emergency room at around 1 AM the following day after her family persuaded. her to call EMS Her to call EMS. Because she presented well outside of the 4-1/2 hour window she was not treated with IV TPA. Further vascular imaging was not performed but MRI of the brain demonstrated a right paramedian pontine acute infarction. The patient has no prior history of TIA or stroke.     Past Medical History:   Diagnosis Date    Chronic musculoskeletal pain     Cystocele with rectocele     H/O    Essential hypertension 2/9/2016    GERD (gastroesophageal reflux disease)     diet controlled     Hip fracture requiring operative repair ([address] 1/9/2015    H/O LFT HIP REPAIR    Hip fracture, left ([address] 1/7/2015    Hyperlipidemia     Hypothyroidism     Impaired mobility and ADLs     Pure hypercholesterolemia 2/9/2016    Type 2 diabetes mellitus without complication ([address] 7/1/3179     Past Surgical History:   Procedure Laterality Date    HX COLONOSCOPY      HX DILATION AND CURETTAGE  HX HIP FRACTURE TX Left     HX MT AND BSO      HX TONSILLECTOMY      HX TUBAL LIGATION       Social History     Social History    Marital status:      Spouse name: N/A    Number of children: N/A    Years of education: N/A     Occupational History    Not on file.      Social History Main Topics    Smoking status: Never Smoker    Smokeless tobacco: Never Used    Alcohol use No    Drug use: No    Sexual activity: Not on file     Other Topics Concern    Not on file     Social History Narrative     Family History   Problem Relation Age of Onset    Cancer Mother     Cancer Father     Breast Cancer Neg Hx        Current Facility-Administered Medications:     atorvastatin (LIPITOR) tablet 80 mg, 80 mg, Oral, DAILY, Ron Polanco MD, Stopped at 07/09/18 0900    cyclobenzaprine (FLEXERIL) tablet 10 mg, 10 mg, Oral, DAILY, Ron Polanco MD, Stopped at 07/09/18 0900    ezetimibe (ZETIA) tablet 10 mg, 10 mg, Oral, DAILY, Ron Polanco MD, Stopped at 07/09/18 0900    gabapentin (NEURONTIN) capsule 600 mg, 600 mg, Oral, TID, Ron Polanco MD, Stopped at 07/09/18 0900    levothyroxine (SYNTHROID) tablet 100 mcg, 100 mcg, Oral, ACB, Ron Polanco MD, 100 mcg at 07/09/18 2697    oxybutynin chloride XL (DITROPAN XL) tablet 10 mg, 10 mg, Oral, DAILY, Ron Polanco MD, Stopped at 07/09/18 0900    traMADol (ULTRAM) tablet 50 mg, 50 mg, Oral, Q12H PRN, Ron Polanco MD    sodium chloride (NS) flush 5-10 mL, 5-10 mL, IntraVENous, Q8H, Ron Polanco MD, Stopped at 07/09/18 1400    sodium chloride (NS) flush 5-10 mL, 5-10 mL, IntraVENous, PRN, Ron Polanco MD    0.9% sodium chloride infusion, 100 mL/hr, IntraVENous, CONTINUOUS, Ron Polanco MD, Last Rate: 100 mL/hr at 07/09/18 0555, 100 mL/hr at 07/09/18 0555    ondansetron (ZOFRAN) injection 4 mg, 4 mg, IntraVENous, Q6H PRN, Ron Polanco MD    acetaminophen (TYLENOL) tablet 650 mg, 650 mg, Oral, Q4H PRN, Yani Sales MD    clopidogrel (PLAVIX) tablet 75 mg, 75 mg, Oral, DAILY, Yani Sales MD, Stopped at 07/09/18 0900    bisacodyl (DULCOLAX) tablet 5 mg, 5 mg, Oral, DAILY PRN, Yani Sales MD    insulin lispro (HUMALOG) injection, , SubCUTAneous, AC&HS, Yani Sales MD, Stopped at 07/09/18 0730    metoprolol (LOPRESSOR) injection 5 mg, 5 mg, IntraVENous, Q6H PRN, Yani Sales MD  [facility] [place] 7/10/2018] cefTRIAXone (ROCEPHIN) 1 g in 0.9% sodium chloride (MBP/ADV) 50 mL, 1 g, IntraVENous, Q24H, Yani Sales MD    heparin (porcine) injection 5,000 Units, 5,000 Units, SubCUTAneous, Q12H, Randy Choudhary MD    aspirin delayed-release tablet 81 mg, 81 mg, Oral, DAILY, Randy Crum MD, 81 mg at 07/09/18 1231    insulin glargine (LANTUS) injection 50 Units, 50 Units, SubCUTAneous, QHS, Randy Choudhary MD    insulin glargine (LANTUS) injection 20 Units, 20 Units, SubCUTAneous, DAILY, Randy Choudhary MD, 20 Units at 07/09/18 1000    Allergies   Allergen Reactions    Codeine Other (comments)     lightheadedness    Tramadol Unknown (comments)     HEADACHE, ONLY IF TAKEN AT NIGHT      ROS  review of systems is otherwise negative    Visit Vitals    /76 (BP 1 Location: Right arm, BP Patient Position: At rest)    Pulse 82    Temp 98.8 °F (37.1 °C)    Resp 17    Ht 5' 7\" (1.702 m)    Wt 155 lb (70.3 kg)    SpO2 95%    BMI 24.28 kg/m2     General: well nourished, appears stated age    Eyes: no proptosis or exophthalmos; conjunctivae clear, sclerae non-icteric    Chest: clear to auscultation    Cardiac: normal S1 S2; no murmurs gallop or rubs    Neurological:    MSE: alert, oriented times 3; fluent but mildly dysarthric speech; no paraphasic errors; follows commands without difficulty    CN 2: visual fields full; no afferent pupillary defect; VA not checked;   CN 3,4,6: Pupils symmetrical in size, reactive to light directly and consensually; no ptosis; full versions and ductions  CN 5: facial sensation intact to light touch and pin prick. ..  CN 7: slight left-sided facial weakness. Slurred speech  CN 8 responds to spoken voice  CN 9,10; palate symmetrical gag intact  CN 11: head turn and shoulder shrug intact  CN 12: tongue midline without atrophy or fasiculations    Motor:  Power 5/5 UE and LE proximal to distal  Fine motor movements symmetrical  Tone normal  Atrophy: absent  Gait: symmetrical arm swing, rises on heels and toes    Cerebellar:  Finger to nose; heel to shin intact  Tandem intact    Sensory  Romberg negative  Intact to primary modalities in all 4 extremities    Reflexes    Symmetrical and normally active at 2+ in UE and LE  Plantar response flexor bilaterally    MRI of the brain reviewed by me demonstrates an area of acute infarction in the krishan on the right side    . History: Pontine infarction.     FINDINGS:     CT angiography was performed of the neck and head with contrast and  three-dimensional CT angiography reconstruction and reformat was performed. NASCET criteria as needed. CT dose reduction was achieved through use of a  standardized protocol tailored for this examination and automatic exposure  control for dose modulation.      The vertebral arteries are patent bilaterally. There is a hypoplastic P1 segment  on the left. Patent posterior communicating arteries bilaterally. The left  posterior communicating artery provides the dominant flow to the left posterior  cerebral artery. Basilar artery is widely patent. The middle cerebral arteries  are patent bilaterally. The anterior cerebral arteries are patent. Atherosclerosis in the carotid siphon bilaterally without significant stenosis. Atherosclerosis at the right carotid bulb without stenosis. The right common  carotid artery is patent. The innominate artery is patent. The left common  carotid artery is patent.  Atherosclerosis at the left carotid bulb without  stenosis.     The dural venous sinuses are patent.     IMPRESSION  IMPRESSION:     Atherosclerosis.  No acute arterial findings.             Signed By: Mikael Mccord MD     July 9, 2018

## 2018-07-09 NOTE — PHYSICIAN ADVISORY
Letter of Determination: Upgrade from Observation to Inpatient Status    This patient was originally hospitalized as Outpatient Status with Observation Services on 7/9/2018 for transient ischemic attack vs cerebral vascular accident. This patient now meets for Inpatient Admission based on medical necessity. The patient's stay was medically necessary based on magnetic resonance imaging study demonstrating acute right pontine infarct, and physician concern for basilar thrombus. It is our recommendation that this patient's hospitalization status should be upgraded from OBSERVATION to INPATIENT status.      The final decision regarding the patient's hospitalization status depends on the attending physician's judgement.     Patricia Lechuga MD, BUZZ,   Physician Pastor Kapadia.

## 2018-07-09 NOTE — H&P
History and Physical 
 
Subjective:  
 
June North is a 80 y.o. white female, with a pm of Bell's Palsy and insulin dependent type 2 diabetes, who presents to the ED for sudden onset of slurred speech around dinnertime yesterday evening.  states she also had some mild gait disturbance. Speech a little improved since then but still unclear and with difficult speaking clearly. CT head WO in the ED negative for acute abnormality. She takes baby aspirin and lipitor at home. She denies chest pain, sob, nausea, diarrhea. UA shows possible UTI and her blood sugar is >350 on admission. She states she missed some insulin dosing yesterday. Full code Pcp: Dr. Mau Tony Next of kin:  Past Medical History:  
Diagnosis Date  Chronic musculoskeletal pain  Cystocele with rectocele H/O  
 Essential hypertension 2/9/2016  GERD (gastroesophageal reflux disease) diet controlled  Hip fracture requiring operative repair ([facility] [address] 1/9/2015 H/O LFT HIP REPAIR  
 Hip fracture, left ([facility] [address] 1/7/2015  Hyperlipidemia  Hypothyroidism  Impaired mobility and ADLs  Pure hypercholesterolemia 2/9/2016  Type 2 diabetes mellitus without complication ([facility] [address] 4/2/8992 Past Surgical History:  
Procedure Laterality Date  HX COLONOSCOPY    
 HX DILATION AND CURETTAGE    
 HX HIP FRACTURE TX Left  HX MT AND BSO  HX TONSILLECTOMY  HX TUBAL LIGATION Family History Problem Relation Age of Onset  Cancer Mother  Cancer Father  Breast Cancer Neg Hx Social History Substance Use Topics  Smoking status: Never Smoker  Smokeless tobacco: Never Used  Alcohol use No  
   
Prior to Admission medications Medication Sig Start Date End Date Taking? Authorizing Provider  
solifenacin (VESICARE) 10 mg tablet Take 10 mg by mouth daily.     Historical Provider  
naproxen (NAPROSYN) 500 mg tablet Take 500 mg by mouth two (2) times daily (with meals). Historical Provider  
gabapentin (NEURONTIN) 600 mg tablet TAKE ONE TABLET BY MOUTH THREE TIMES DAILY 6/12/18   Klaudia Avila MD  
[address] TEST STRIP strip USE TO TEST BLOOD SUGARS THREE TIMES A DAY 4/30/18   MD ALEE Harris BULK, by Does Not Apply route. Historical Provider HUMALOG KWIKPEN 100 unit/mL kwikpen INJECT 40  UNITS SUBCUTANEOUSLY AT BEDTIME 2/6/18   Tg Sal MD  
ezetimibe (ZETIA) 10 mg tablet Take 1 Tab by mouth daily. 1/22/18   Tg Sal MD  
cyclobenzaprine (FLEXERIL) 10 mg tablet Take 1 Tab by mouth daily. 1/22/18   Tg Sal MD  
traMADol Alver Galas) 50 mg tablet TAKE ONE TABLET BY MOUTH TWICE DAILY AS NEEDED FOR 30 DAYS 1/22/18   Tg Sal MD  
BD INSULIN PEN NEEDLE UF SHORT 31 gauge x 5/16\" ndle USE  TWICE DAILY AS DIRECTED WITH  INSULIN  PENS 1/2/18   MD LAUREL Harris SOLOSTAR 300 unit/mL (1.5 mL) inpn INJECT 70  UNITS SUBCUTANEOUSLY ONCE DAILY WITH DINNER 1/2/18   Tg Sal MD  
glyBURIDE-metFORMIN (GLUCOVANCE) 5-500 mg per tablet Take 1 Tab by mouth Daily (before breakfast). Indications: type 2 diabetes mellitus 11/8/17   Tg Sal MD  
atorvastatin (LIPITOR) 80 mg tablet Take 1 Tab by mouth daily. Indications: hyperlipidemia 11/8/17   Tg Sal MD  
oxybutynin chloride XL (DITROPAN XL) 10 mg CR tablet Take 1 Tab by mouth daily. 11/8/17   Tg Sal MD  
meloxicam (MOBIC) 15 mg tablet Take 1 Tab by mouth daily. 11/8/17   Tg Sal MD  
cyanocobalamin 1,000 mcg tablet Take 1,000 mcg by mouth daily. Historical Provider  
levothyroxine (SYNTHROID) 100 mcg tablet TAKE ONE TABLET BY MOUTH ONCE DAILY BEFORE BREAKFAST FOR HYPOTHYROIDISM 7/17/17   Tg Sal MD  
[facility] ULTRA TEST strip TEST BLOOD SUGAR TWICE A   DAY 7/29/16   Tg Sal MD  
CALCIUM CARB/VIT D3/MINERALS (CALCIUM-VITAMIN D PO) Take  by mouth. Historical Provider MULTIVITAMIN PO Take  by mouth.     Historical Provider Allergies Allergen Reactions  Codeine Other (comments) lightheadedness  Tramadol Unknown (comments) HEADACHE, ONLY IF TAKEN AT NIGHT Review of Systems: A comprehensive review of systems was negative except for that written in the History of Present Illness. 7/9/18 Denies alcohol, tobacco, illicit drug use Lives with  and pet dog No tattoos No recent foreign travel Objective: Intake and Output:   
  
  
 
Physical Exam:  
General: awake, alert, oriented Eyes; non icteric, EOMI Neck; supple CV: RRR Pulm; CTAB Abd; soft, non tender, active BS Neuro: no tongue deviation, understands what I am saying and follows commands but with slurring to her speech, 5/5 strength of all extremities without focal deficits Skin/ext: warm, dry, no obvious rashes nor lesions Data Review:  
Recent Results (from the past 24 hour(s)) PROTHROMBIN TIME + INR Collection Time: 07/09/18  1:36 AM  
Result Value Ref Range Prothrombin time 14.1 11.5 - 14.5 sec INR 1.1 GLUCOSE, POC Collection Time: 07/09/18  1:38 AM  
Result Value Ref Range Glucose (POC) 361 (H) 65 - 100 mg/dL CBC WITH AUTOMATED DIFF Collection Time: 07/09/18  1:45 AM  
Result Value Ref Range WBC 4.5 4.3 - 11.1 K/uL  
 RBC 4.04 (L) 4.05 - 5.25 M/uL  
 HGB 10.3 (L) 11.7 - 15.4 g/dL HCT 32.3 (L) 35.8 - 46.3 % MCV 80.0 79.6 - 97.8 FL  
 MCH 25.5 (L) 26.1 - 32.9 PG  
 MCHC 31.9 31.4 - 35.0 g/dL  
 RDW 15.4 (H) 11.9 - 14.6 % PLATELET 912 (L) 575 - 450 K/uL MPV 10.0 (L) 10.8 - 14.1 FL  
 DF AUTOMATED NEUTROPHILS 58 43 - 78 % LYMPHOCYTES 27 13 - 44 % MONOCYTES 12 4.0 - 12.0 % EOSINOPHILS 3 0.5 - 7.8 % BASOPHILS 0 0.0 - 2.0 % IMMATURE GRANULOCYTES 0 0.0 - 5.0 %  
 ABS. NEUTROPHILS 2.6 1.7 - 8.2 K/UL  
 ABS. LYMPHOCYTES 1.2 0.5 - 4.6 K/UL  
 ABS. MONOCYTES 0.6 0.1 - 1.3 K/UL  
 ABS. EOSINOPHILS 0.1 0.0 - 0.8 K/UL  
 ABS. BASOPHILS 0.0 0.0 - 0.2 K/UL  
 ABS. IMM. GRANS. 0.0 0.0 - 0.5 K/UL METABOLIC PANEL, COMPREHENSIVE Collection Time: 07/09/18  1:45 AM  
Result Value Ref Range Sodium 141 136 - 145 mmol/L Potassium 4.1 3.5 - 5.1 mmol/L Chloride 105 98 - 107 mmol/L  
 CO2 26 21 - 32 mmol/L Anion gap 10 7 - 16 mmol/L Glucose 378 (H) 65 - 100 mg/dL BUN 14 8 - 23 MG/DL Creatinine 0.86 0.6 - 1.0 MG/DL  
 GFR est AA >60 >60 ml/min/1.73m2 GFR est non-AA >60 >60 ml/min/1.73m2 Calcium 9.8 8.3 - 10.4 MG/DL Bilirubin, total 0.6 0.2 - 1.1 MG/DL  
 ALT (SGPT) 58 12 - 65 U/L  
 AST (SGOT) 52 (H) 15 - 37 U/L Alk. phosphatase 120 50 - 136 U/L Protein, total 6.9 6.3 - 8.2 g/dL Albumin 3.0 (L) 3.2 - 4.6 g/dL Globulin 3.9 (H) 2.3 - 3.5 g/dL A-G Ratio 0.8 (L) 1.2 - 3.5    
TROPONIN I Collection Time: 07/09/18  1:45 AM  
Result Value Ref Range Troponin-I, Qt. <0.02 (L) 0.02 - 0.05 NG/ML  
ETHYL ALCOHOL Collection Time: 07/09/18  1:45 AM  
Result Value Ref Range ALCOHOL(ETHYL),SERUM <3 MG/DL URINE MICROSCOPIC Collection Time: 07/09/18  2:12 AM  
Result Value Ref Range WBC 10-20 0 /hpf  
 RBC 0 0 /hpf Epithelial cells 0 0 /hpf Bacteria 4+ (H) 0 /hpf Casts 0 0 /lpf Crystals, urine 0 0 /LPF Mucus 0 0 /lpf Other observations RESULTS VERIFIED MANUALLY Assessment:  
 
Place on observation on remote telemetry for tia/cva workup. Principal Problem: 
  Slurred speech (7/9/2018) Continue aspirin and lipitor and add plavix daily for now. Check MRI brain WO, carotid US, echo, A1C and lipid panel. ST, PT, OT consults. Active Problems: 
  Type 2 diabetes mellitus without complication, with long-term current use of insulin ([address] (4/6/2017) Add SSI. Check A1C. /hr. BSs elevated currently- did miss insulin doses yesterday. Holding oral diabetic agents UTI (urinary tract infection) (7/9/2018) Start rocephin. /hr. Culture urine. Full code Ppx: subq heparin Anticipated date of dc: tomorrow if mri brain wo negative Signed By: Margarita Tirado MD   
 July 9, 2018

## 2018-07-09 NOTE — PROGRESS NOTES
Medicare Outpatient Observation Notice provided to the patient. Oral explanation was provided and all questions answered. Signed document placed in the medical record under media tab. Copy given to patient.

## 2018-07-09 NOTE — PROGRESS NOTES
PT Note:    Chart reviewed and attempted PT Evaluation this AM. Pt currently having procedure (ultrasound), will check back later time/date as schedule allows.     Thanks,  Samanta Vigil, DPT

## 2018-07-09 NOTE — ED NOTES
TRANSFER - OUT REPORT:    Verbal report given to [employer] on [employer]  being transferred to [id number] for routine progression of care       Report consisted of patients Situation, Background, Assessment and   Recommendations(SBAR). Information from the following report(s) ED Summary was reviewed with the receiving nurse. Lines:   Peripheral IV 07/09/18 Left Forearm (Active)   Site Assessment Clean, dry, & intact 7/9/2018  1:47 AM   Phlebitis Assessment 0 7/9/2018  1:47 AM   Infiltration Assessment 0 7/9/2018  1:47 AM   Dressing Status Clean, dry, & intact 7/9/2018  1:47 AM        Opportunity for questions and clarification was provided.       Patient transported with:   [employer]

## 2018-07-09 NOTE — PROGRESS NOTES
Ischemic Stroke without Activase/TIA    VTE Prophylaxis: No    Antiplatelet: No    Statin if LDL Greater Than or Equal to100: Yes: Lipitor    BP Parameters: Less Than 220/120 for 24 hours, then consult MD for parameters    Controlled With: Scheduled PO    Dysphagia Screen Completed: Yes: Pass    Patient has PEG, NG Tube, Feeding Tube: Yes    Medication orders per above route: Yes    Nutrition Status: PO    NIH Stroke Scale Complete: Yes: 2    Frequency of Vital Signs: Every 4 hours     Frequency of Neuro Checks: Every 4 hours    Daily Education/Care Plan Updated: Yes    Lamin Wahsburn

## 2018-07-09 NOTE — ED TRIAGE NOTES
Patient arrives via EMS from home with slurred speech. EMS states that family noticed that she was having trouble speaking around [id number] today. States that she noticed that her blood sugar was high and took her insulin. Patient is alert and oriented at triage with some slight slurred speech.

## 2018-07-09 NOTE — ED PROVIDER NOTES
HPI Comments: 60-year-old female w/ PMHX of Bell's Palsy with chronic left facial droop and DM2 presents with complaint of slurred speech. Patient last seen normal around 9:30 this morning. According to patient she left for [employer] at 3 pm.  States that she returned at around 5:30 PM today.  states that since returning home she's been having difficulty speaking. States that she noticed that her blood sugar was high at home and took 25 units of insulin. Denies focal weakness, numbness, tingling, recent trauma or injury, nausea, vomiting, fever, chills, neck pain, vision disturbance, chest pain, shortness of breath, abdominal pain. Denies history of previous CVA. Denies any recent alcohol or illicit drug use. Patient is a 80 y.o. female presenting with context. The history is provided by the patient. No  was used. Dysarthria   This is a new problem. The current episode started 6 to 12 hours ago. The problem has not changed since onset. There was no focality noted. Primary symptoms include slurred speech, speech difficulty and auditory change. Pertinent negatives include no focal weakness, no loss of sensation, no loss of balance, no memory loss, no movement disorder, no visual change, no mental status change, no unresponsiveness and no disorientation. There has been no fever. Pertinent negatives include no shortness of breath, no chest pain, no vomiting, no altered mental status, no confusion, no headaches and no nausea.  There were no medications administered prior to arrival.        Past Medical History:   Diagnosis Date    Chronic musculoskeletal pain     Cystocele with rectocele     H/O    Essential hypertension 2/9/2016    GERD (gastroesophageal reflux disease)     diet controlled     Hip fracture requiring operative repair ([facility] [address] 1/9/2015    H/O LFT HIP REPAIR    Hip fracture, left ([facility] [address] 1/7/2015    Hyperlipidemia     Hypothyroidism     Impaired mobility and ADLs     Pure hypercholesterolemia 2/9/2016    Type 2 diabetes mellitus without complication ([facility] [address] 9/9/8996       Past Surgical History:   Procedure Laterality Date    HX COLONOSCOPY      HX DILATION AND CURETTAGE      HX HIP FRACTURE TX Left     HX MT AND BSO      HX TONSILLECTOMY      HX TUBAL LIGATION           Family History:   Problem Relation Age of Onset    Cancer Mother     Cancer Father     Breast Cancer Neg Hx        Social History     Social History    Marital status:      Spouse name: N/A    Number of children: N/A    Years of education: N/A     Occupational History    Not on file. Social History Main Topics    Smoking status: Never Smoker    Smokeless tobacco: Never Used    Alcohol use No    Drug use: No    Sexual activity: Not on file     Other Topics Concern    Not on file     Social History Narrative         ALLERGIES: Codeine and Tramadol    Review of Systems   Constitutional: Positive for fatigue. Negative for chills and fever. HENT: Negative for congestion, rhinorrhea, trouble swallowing and voice change. Eyes: Negative for pain, redness and visual disturbance. Respiratory: Negative for cough and shortness of breath. Cardiovascular: Negative for chest pain and palpitations. Gastrointestinal: Negative for abdominal pain, diarrhea, nausea and vomiting. Genitourinary: Negative for dysuria, flank pain and hematuria. Musculoskeletal: Negative for back pain, gait problem, joint swelling, myalgias, neck pain and neck stiffness. Skin: Negative for pallor and rash. Neurological: Positive for speech difficulty. Negative for dizziness, focal weakness, seizures, syncope, weakness, numbness, headaches and loss of balance. Psychiatric/Behavioral: Negative for confusion and memory loss.        Vitals:    07/09/18 0133   BP: 133/67   Pulse: 83   Resp: 20   Temp: 98.8 °F (37.1 °C)   SpO2: 96%   Weight: 70.3 kg (155 lb)   Height: 5' 7\" (1.702 m)            Physical Exam Constitutional: She is oriented to person, place, and time. She appears well-developed. Patient smells of alcohol. HENT:   Head: Normocephalic. Mouth/Throat: Oropharynx is clear and moist. No oropharyngeal exudate. Left sided facial droop at baseline given history of previous Bell's palsy. Eyes: Conjunctivae and EOM are normal. Pupils are equal, round, and reactive to light. No nystagmus. Neck: Normal range of motion. No JVD present. No tracheal deviation present. Cardiovascular: Normal rate, regular rhythm, normal heart sounds and intact distal pulses. Pulmonary/Chest: Effort normal and breath sounds normal. She has no wheezes. She has no rales. CTAB. Abdominal: Soft. There is no tenderness. There is no rebound and no guarding. Soft, NTND. Musculoskeletal: Normal range of motion. She exhibits no edema. Neurological: She is alert and oriented to person, place, and time. No cranial nerve deficit. Coordination normal.   Mild dysarthria noted. Strength 5 out of 5 throughout. No focal weakness. Normal sensory exam. No meningismus or nuchal rigidity. Skin: Skin is warm and dry. No rash noted. No erythema. No pallor. Psychiatric: She has a normal mood and affect. Her behavior is normal.   Nursing note and vitals reviewed. MDM  Number of Diagnoses or Management Options  Acute cystitis without hematuria: new and requires workup  Dysarthria: new and requires workup  Hyperglycemia: new and requires workup  Transient cerebral ischemia, unspecified type: new and requires workup  Diagnosis management comments: Patient with improvement of slurred speech while in ER. Patient outside window for TPA and therefore not a candidate for TPA. CT head w/o acute findings. Patient given aspirin. UA with evidence of UTI. Urine culture pending. Patient to be treated with Rocephin IV. Hospitalist consulted for admission.        Amount and/or Complexity of Data Reviewed  Clinical lab tests: ordered and reviewed  Tests in the radiology section of CPT®: ordered and reviewed  Tests in the medicine section of CPT®: ordered and reviewed  Review and summarize past medical records: yes  Independent visualization of images, tracings, or specimens: yes    Risk of Complications, Morbidity, and/or Mortality  Presenting problems: moderate  Diagnostic procedures: moderate  Management options: moderate    Patient Progress  Patient progress: stable        ED Course   Comment By Time   CXR Impression- No acute abnormality Jaison Garcia MD 07/09 0239   CT head Impression: No Acute Abnormality.  Jaison Garcia MD 07/09 0401       EKG  Date/Time: 7/9/2018 1:46 AM  Performed by: Tai Hdz  Authorized by: Tai Hdz     ECG reviewed by ED Physician in the absence of a cardiologist: yes    Rate:     ECG rate:  83    ECG rate assessment: normal    Rhythm:     Rhythm: sinus rhythm    Ectopy:     Ectopy: none    QRS:     QRS axis:  Normal    QRS intervals:  Normal  Conduction:     Conduction: normal    ST segments:     ST segments:  Normal  T waves:     T waves: normal        Results Include:    Recent Results (from the past 24 hour(s))   PROTHROMBIN TIME + INR    Collection Time: 07/09/18  1:36 AM   Result Value Ref Range    Prothrombin time 14.1 11.5 - 14.5 sec    INR 1.1     GLUCOSE, POC    Collection Time: 07/09/18  1:38 AM   Result Value Ref Range    Glucose (POC) 361 (H) 65 - 100 mg/dL   CBC WITH AUTOMATED DIFF    Collection Time: 07/09/18  1:45 AM   Result Value Ref Range    WBC 4.5 4.3 - 11.1 K/uL    RBC 4.04 (L) 4.05 - 5.25 M/uL    HGB 10.3 (L) 11.7 - 15.4 g/dL    HCT 32.3 (L) 35.8 - 46.3 %    MCV 80.0 79.6 - 97.8 FL    MCH 25.5 (L) 26.1 - 32.9 PG    MCHC 31.9 31.4 - 35.0 g/dL    RDW 15.4 (H) 11.9 - 14.6 %    PLATELET 195 (L) 058 - 450 K/uL    MPV 10.0 (L) 10.8 - 14.1 FL    DF AUTOMATED      NEUTROPHILS 58 43 - 78 %    LYMPHOCYTES 27 13 - 44 %    MONOCYTES 12 4.0 - 12.0 %    EOSINOPHILS 3 0.5 - 7.8 %    BASOPHILS 0 0.0 - 2.0 %    IMMATURE GRANULOCYTES 0 0.0 - 5.0 %    ABS. NEUTROPHILS 2.6 1.7 - 8.2 K/UL    ABS. LYMPHOCYTES 1.2 0.5 - 4.6 K/UL    ABS. MONOCYTES 0.6 0.1 - 1.3 K/UL    ABS. EOSINOPHILS 0.1 0.0 - 0.8 K/UL    ABS. BASOPHILS 0.0 0.0 - 0.2 K/UL    ABS. IMM. GRANS. 0.0 0.0 - 0.5 K/UL   METABOLIC PANEL, COMPREHENSIVE    Collection Time: 07/09/18  1:45 AM   Result Value Ref Range    Sodium 141 136 - 145 mmol/L    Potassium 4.1 3.5 - 5.1 mmol/L    Chloride 105 98 - 107 mmol/L    CO2 26 21 - 32 mmol/L    Anion gap 10 7 - 16 mmol/L    Glucose 378 (H) 65 - 100 mg/dL    BUN 14 8 - 23 MG/DL    Creatinine 0.86 0.6 - 1.0 MG/DL    GFR est AA >60 >60 ml/min/1.73m2    GFR est non-AA >60 >60 ml/min/1.73m2    Calcium 9.8 8.3 - 10.4 MG/DL    Bilirubin, total 0.6 0.2 - 1.1 MG/DL    ALT (SGPT) 58 12 - 65 U/L    AST (SGOT) 52 (H) 15 - 37 U/L    Alk. phosphatase 120 50 - 136 U/L    Protein, total 6.9 6.3 - 8.2 g/dL    Albumin 3.0 (L) 3.2 - 4.6 g/dL    Globulin 3.9 (H) 2.3 - 3.5 g/dL    A-G Ratio 0.8 (L) 1.2 - 3.5     TROPONIN I    Collection Time: 07/09/18  1:45 AM   Result Value Ref Range    Troponin-I, Qt. <0.02 (L) 0.02 - 0.05 NG/ML   ETHYL ALCOHOL    Collection Time: 07/09/18  1:45 AM   Result Value Ref Range    ALCOHOL(ETHYL),SERUM <3 MG/DL   URINE MICROSCOPIC    Collection Time: 07/09/18  2:12 AM   Result Value Ref Range    WBC 10-20 0 /hpf    RBC 0 0 /hpf    Epithelial cells 0 0 /hpf    Bacteria 4+ (H) 0 /hpf    Casts 0 0 /lpf    Crystals, urine 0 0 /LPF    Mucus 0 0 /lpf    Other observations RESULTS VERIFIED MANUALLY       Jaison Armijo MD; 7/9/2018 @4:11 AM Voice dictation software was used during the making of this note. This software is not perfect and grammatical and other typographical errors may be present.   This note has not been proofread for errors.  ===================================================================                          NIHSS Stroke Scale      Interval: Baseline  Time: 1:50 AM    Person Administering Scale: Jaison Soriano MD  Administer stroke scale items in the order listed. Record performance in each category after each subscale exam. Do not go back and change scores. Follow directions provided for each exam technique. Scores should reflect what the patient does, not what the clinician thinks the patient can do. The clinician should record answers while administering the exam and work quickly. Except where indicated, the patient should not be coached (i.e., repeated requests to patient to make a special effort). 1a  Level of consciousness: 0=alert; keenly responsive   1b. LOC questions:  0=Answers both questions correctly   1c. LOC commands: 0=Performs both tasks correctly   2. Best Gaze: 0=normal   3. Visual: 0=No visual loss   4. Facial Palsy: 1=Minor paralysis (flattened nasolabial fold, asymmetric on smiling)   5a. Motor left arm: 0=No drift, arm holds 90 (or 45) degrees for full 10 seconds   5b. Motor right arm: 0=No drift, arm holds 90 (or 45) degrees for full 10 seconds   6a. Motor left le=No drift; leg holds 30-degree position for full 5 seconds. 6b  Motor right le=No drift; leg holds 30-degree position for full 5 seconds. 7. Limb Ataxia: 0=Absent   8. Sensory: 0=Normal; no sensory loss   9. Best Language:  0=No aphasia, normal   10. Dysarthria: 1=Mild to moderate, patient slurs at least some words and at worst, can be understood with some difficulty   11. Extinction and Inattention: 0=No abnormality    Total:   1-4= Minor stroke     Patient outside window for tPA.

## 2018-07-09 NOTE — PROGRESS NOTES
Attempted OT evaluation. Patient with transport going for CT. Will check back as schedule permits and patient is available.    Thank you for this consult,   Zehra Alberto, OTR/L

## 2018-07-09 NOTE — PROGRESS NOTES
Problem: Falls - Risk of  Goal: *Absence of Falls  Document Cecily Fall Risk and appropriate interventions in the flowsheet.    Outcome: Progressing Towards Goal  Fall Risk Interventions:  Mobility Interventions: Bed/chair exit alarm, Communicate number of staff needed for ambulation/transfer, Patient to call before getting OOB, PT Consult for assist device competence, PT Consult for mobility concerns, Strengthening exercises (ROM-active/passive)

## 2018-07-09 NOTE — PROGRESS NOTES
Problem: Mobility Impaired (Adult and Pediatric)  Goal: *Acute Goals and Plan of Care (Insert Text)  LTG:  (1.)Ms. Mesfin Burton will move from supine to sit and sit to supine , scoot up and down and roll side to side in bed with INDEPENDENT within 7 treatment day(s). (2.)Ms. Mesfin Burton will transfer from bed to chair and chair to bed with MODIFIED INDEPENDENCE using the least restrictive device within 7 treatment day(s). (3.)Ms. Huang will ambulate with MODIFIED INDEPENDENCE for 250+ feet with the least restrictive device within 7 treatment day(s). (4.)Ms. Huang will ascend/descend 2 steps with no handrail with SUPERVISION within 7 treatment days. ________________________________________________________________________________________________      PHYSICAL THERAPY: Initial Assessment, Treatment Day: Day of Assessment, AM 7/9/2018  OBSERVATION: Hospital Day: 1  Payor: Wendy Novak / Plan: [address] [place] [address] HMO / Product Type: [employer] Medicare /      NAME/AGE/GENDER: Chang Townsend is a 80 y.o. female   PRIMARY DIAGNOSIS: Slurred speech  CVA (cerebral vascular accident) ([facility] [address] Slurred speech Slurred speech        ICD-10: Treatment Diagnosis:    · Difficulty in walking, Not elsewhere classified (R26.2)   Precaution/Allergies:  Codeine and Tramadol      ASSESSMENT:     Ms. Mesfin Burton is a 80 y.o. Female with primary diagnosis of CVA and slurred speech. Pt is supine in bed upon contact, A&O x 4, and agreeable to PT evaluation with encouragement. Pt states she lives with her  and daughter in single story home with 2 steps to enter with no handrail, and a tub/shower combo. Pt states she is independent with ADLs and ambulation, but states \"I've have been wobbly while I walk for about a year and a half now. \" Pt has a RW at home but doesn't use it because \"I have things I have to do around the house\". Educated pt on importance of safety, fall prevention, and necessity of AD when feeling unsteady.  Pt resistant to using RW. Pt states she was working up until this month, but quit to help take care of her  who has multiple health issues. Pt reports 0/10 pain currently and no recent falls. Pt transferred from supine to sitting EOB with SBA, demonstrating good sitting balance. Pt reports occasional tingling/burning in feet (Hx of Type II DM per chart), but sensation appears intact and B LE strength and AROM equal and WFL. Pt performed STS with SBA, demonstrating good static standing balance. Pt ambulated 50ft in hallway with CGA and no AD, with frequent path deviations and reaching for walls and handrails. Pt demonstrating shuffling gait with narrow GILES as well before returning to bedside. Pt became tearful when speaking with MD about deficits, so pt left sitting EOB with MD with all needs met and within reach. Ada Nair will benefit from skilled PT (medically necessary) to address decreased balance, decreased functional tolerance, and decreased cardiopulmonary endurance affecting participation in basic ADLs and functional tasks. This section established at most recent assessment   PROBLEM LIST (Impairments causing functional limitations):  1. Decreased Transfer Abilities  2. Decreased Ambulation Ability/Technique  3. Decreased Balance  4. Decreased Activity Tolerance  5. Decreased Pacing Skills  6. Decreased [place] with Home Exercise Program   INTERVENTIONS PLANNED: (Benefits and precautions of physical therapy have been discussed with the patient.)  1. Balance Exercise  2. Bed Mobility  3. Family Education  4. Gait Training  5. Home Exercise Program (HEP)  6. Manual Therapy  7. Neuromuscular Re-education/Strengthening  8. Range of Motion (ROM)  9. Therapeutic Activites  10. Therapeutic Exercise/Strengthening  11. Transfer Training  12.  Group Therapy     TREATMENT PLAN: Frequency/Duration: 3 times a week for duration of hospital stay  Rehabilitation Potential For Stated Goals: Good     RECOMMENDED REHABILITATION/EQUIPMENT: (at time of discharge pending progress): Due to the probability of continued deficits (see above) this patient will likely need continued skilled physical therapy after discharge. Equipment:    None at this time              HISTORY:   History of Present Injury/Illness (Reason for Referral):  Ralph Bailey is a 80 y.o. white female, with a pm of Bell's Palsy and insulin dependent type 2 diabetes, who presents to the ED for sudden onset of slurred speech around dinnertime yesterday evening.  states she also had some mild gait disturbance. Speech a little improved since then but still unclear and with difficult speaking clearly. CT head WO in the ED negative for acute abnormality. She takes baby aspirin and lipitor at home. She denies chest pain, sob, nausea, diarrhea. UA shows possible UTI and her blood sugar is >350 on admission. She states she missed some insulin dosing yesterday  Past Medical History/Comorbidities:   Ms. Caleen Meigs  has a past medical history of Chronic musculoskeletal pain; Cystocele with rectocele; Essential hypertension (2/9/2016); GERD (gastroesophageal reflux disease); Hip fracture requiring operative repair ([facility] [address] (1/9/2015); Hip fracture, left ([facility] [address] (1/7/2015); Hyperlipidemia; Hypothyroidism; Impaired mobility and ADLs; Pure hypercholesterolemia (2/9/2016); and Type 2 diabetes mellitus without complication ([facility] [address] (6/4/7115). Ms. Caleen Meigs  has a past surgical history that includes hx tonsillectomy; hx tubal ligation; hx dilation and curettage; hx colonoscopy; hx magali and bso; and hx hip fracture tx (Left).   Social History/Living Environment:   Home Environment: Private residence  # Steps to Enter: 2  Rails to Enter: No  One/Two Story Residence: One story  Living Alone: No  Support Systems: Spouse/Significant Other/Partner, Family member(s)  Patient Expects to be Discharged to[de-identified] Private residence  Current DME Used/Available at Home: Walker, rolling  Tub or Shower Type: Tub/Shower combination  Prior Level of Function/Work/Activity:  Independent, living with  and daughter, taking care of sick , no falls but often feels \"unsteady\" when walking   Number of Personal Factors/Comorbidities that affect the Plan of Care: 3+: HIGH COMPLEXITY   EXAMINATION:   Most Recent Physical Functioning:   Gross Assessment:  AROM: Within functional limits  Strength: Within functional limits  Coordination: Within functional limits  Tone: Normal  Sensation: Intact (tingling in feet but intact sensation)               Posture:     Balance:  Sitting: Impaired  Sitting - Static: Good (unsupported)  Sitting - Dynamic: Fair (occasional)  Standing: Impaired  Standing - Static: Good  Standing - Dynamic : Fair Bed Mobility:  Rolling: Stand-by assistance  Supine to Sit: Stand-by assistance  Sit to Supine: Stand-by assistance  Scooting: Stand-by assistance  Wheelchair Mobility:     Transfers:  Sit to Stand: Stand-by assistance  Stand to Sit: Stand-by assistance  Gait:     Base of Support: Narrowed  Speed/Rocio: Fluctuations  Step Length: Left shortened;Right shortened  Gait Abnormalities: Path deviations;Trunk sway increased;Decreased step clearance  Distance (ft): 50 Feet (ft)  Assistive Device: Other (comment) (none)  Ambulation - Level of Assistance: Contact guard assistance      Body Structures Involved:  1. Heart  2. Lungs  3. Bones  4. Joints  5. Muscles  6. Ligaments Body Functions Affected:  1. Cardio  2. Respiratory  3. Neuromusculoskeletal  4. Movement Related Activities and Participation Affected:  1. Mobility  2. Self Care  3. Domestic Life  4. Interpersonal Interactions and Relationships  5.  Community, Social and [place]   Number of elements that affect the Plan of Care: 4+: HIGH COMPLEXITY   CLINICAL PRESENTATION:   Presentation: Stable and uncomplicated: LOW COMPLEXITY   CLINICAL DECISION MAKIN [facility] Form  How much difficulty does the patient currently have. .. Unable A Lot A Little None   1. Turning over in bed (including adjusting bedclothes, sheets and blankets)? [] 1   [] 2   [] 3   [x] 4   2. Sitting down on and standing up from a chair with arms ( e.g., wheelchair, bedside commode, etc.)   [] 1   [] 2   [] 3   [x] 4   3. Moving from lying on back to sitting on the side of the bed? [] 1   [] 2   [] 3   [x] 4   How much help from another person does the patient currently need. .. Total A Lot A Little None   4. Moving to and from a bed to a chair (including a wheelchair)? [] 1   [] 2   [x] 3   [] 4   5. Need to walk in hospital room? [] 1   [x] 2   [] 3   [] 4   6. Climbing 3-5 steps with a railing? [] 1   [x] 2   [] 3   [] 4   © 2007, Trustees of [facility] [employer], under license to [employer]. All rights reserved      Score:  Initial: 19 Most Recent: X (Date: -- )    Interpretation of Tool:  Represents activities that are increasingly more difficult (i.e. Bed mobility, Transfers, Gait). Score 24 23 22-20 19-15 14-10 9-7 6     Modifier CH CI CJ CK CL CM CN      ? Mobility - Walking and Moving Around:     - CURRENT STATUS: CK - 40%-59% impaired, limited or restricted    - GOAL STATUS: CJ - 20%-39% impaired, limited or restricted    - D/C STATUS:  ---------------To be determined---------------  Payor: Cristina Rolle / Plan: [address] [place] [address] HMO / Product Type: Managed Care Medicare /      Medical Necessity:     · Patient demonstrates good rehab potential due to higher previous functional level. Reason for Services/Other Comments:  · Patient continues to require skilled intervention due to impaired functional mobility and balance.    Use of outcome tool(s) and clinical judgement create a POC that gives a: Clear prediction of patient's progress: LOW COMPLEXITY            TREATMENT:   (In addition to Assessment/Re-Assessment sessions the following treatments were rendered) Pre-treatment Symptoms/Complaints:  \"I'm ok I guess\"  Pain: Initial: 0/10     Post Session:  0/10     Assessment/Reassessment only, no treatment provided today    Braces/Orthotics/Lines/Etc:   · O2 Device: Room air  Treatment/Session Assessment:    · Response to Treatment:  Ambulated 50ft with CGA and no AD  · Interdisciplinary Collaboration:   o Physical Therapist  o Registered Nurse  o Physician  · After treatment position/precautions:   o Bed/Chair-wheels locked  o Bed in low position  o Call light within reach  o MD at bedside  o sitting EOB   · Compliance with Program/Exercises: Will assess as treatment progresses. · Recommendations/Intent for next treatment session: \"Next visit will focus on advancements to more challenging activities and reduction in assistance provided\".   Total Treatment Duration:  PT Patient Time In/Time Out  Time In: 1000  Time Out: 1847 Shaila Schilling, PT

## 2018-07-09 NOTE — PROGRESS NOTES
07/09/18 0545   Dual Skin Pressure Injury Assessment   Dual Skin Pressure Injury Assessment WDL   Second Care Provider (Based on Facility Policy) Luiza Serna RN   Skin Integumentary   Skin Integumentary (WDL) WDL   Skin Color Appropriate for ethnicity   Skin Condition/Temp Warm   Skin Integrity Scars (comment)   Turgor Non-tenting   Hair Growth Absent   Varicosities Absent   Wound Prevention and Protection Methods   Orientation of Wound Prevention Posterior   Location of Wound Prevention Buttocks   Dressing Present  No   Wound Offloading (Prevention Methods) Bed, pressure reduction mattress

## 2018-07-09 NOTE — PROGRESS NOTES
Problem: Interdisciplinary Rounds  Goal: Interdisciplinary Rounds  Outcome: Progressing Towards Goal  Interdisciplinary team rounds were held 7/9/2018 with the following team members:Care Management, Physical Therapy, Physician and  and the patient and daughter. Anticipate discharge home with home health tomorrow or Wednesday. Plan of care discussed. See clinical pathway and/or care plan for interventions and desired outcomes.

## 2018-07-10 ENCOUNTER — APPOINTMENT (OUTPATIENT)
Dept: GENERAL RADIOLOGY | Age: 68
DRG: 065 | End: 2018-07-10
Attending: INTERNAL MEDICINE
Payer: MEDICARE

## 2018-07-10 LAB
ANION GAP SERPL CALC-SCNC: 8 MMOL/L (ref 7–16)
BASOPHILS # BLD: 0 K/UL (ref 0–0.2)
BASOPHILS NFR BLD: 1 % (ref 0–2)
BUN SERPL-MCNC: 8 MG/DL (ref 8–23)
CALCIUM SERPL-MCNC: 9.1 MG/DL (ref 8.3–10.4)
CHLORIDE SERPL-SCNC: 112 MMOL/L (ref 98–107)
CHOLEST SERPL-MCNC: 70 MG/DL
CO2 SERPL-SCNC: 25 MMOL/L (ref 21–32)
CREAT SERPL-MCNC: 0.44 MG/DL (ref 0.6–1)
DIFFERENTIAL METHOD BLD: ABNORMAL
EOSINOPHIL # BLD: 0.2 K/UL (ref 0–0.8)
EOSINOPHIL NFR BLD: 6 % (ref 0.5–7.8)
ERYTHROCYTE [DISTWIDTH] IN BLOOD BY AUTOMATED COUNT: 15.5 % (ref 11.9–14.6)
EST. AVERAGE GLUCOSE BLD GHB EST-MCNC: 212 MG/DL
GLUCOSE BLD STRIP.AUTO-MCNC: 168 MG/DL (ref 65–100)
GLUCOSE BLD STRIP.AUTO-MCNC: 177 MG/DL (ref 65–100)
GLUCOSE BLD STRIP.AUTO-MCNC: 273 MG/DL (ref 65–100)
GLUCOSE BLD STRIP.AUTO-MCNC: 277 MG/DL (ref 65–100)
GLUCOSE BLD STRIP.AUTO-MCNC: 304 MG/DL (ref 65–100)
GLUCOSE BLD STRIP.AUTO-MCNC: 324 MG/DL (ref 65–100)
GLUCOSE SERPL-MCNC: 128 MG/DL (ref 65–100)
HBA1C MFR BLD: 9 % (ref 4.8–6)
HCT VFR BLD AUTO: 33.3 % (ref 35.8–46.3)
HDLC SERPL-MCNC: 30 MG/DL (ref 40–60)
HDLC SERPL: 2.3 {RATIO}
HGB BLD-MCNC: 10.5 G/DL (ref 11.7–15.4)
IMM GRANULOCYTES # BLD: 0 K/UL (ref 0–0.5)
IMM GRANULOCYTES NFR BLD AUTO: 0 % (ref 0–5)
LDLC SERPL CALC-MCNC: 9 MG/DL
LIPID PROFILE,FLP: ABNORMAL
LYMPHOCYTES # BLD: 1.1 K/UL (ref 0.5–4.6)
LYMPHOCYTES NFR BLD: 33 % (ref 13–44)
MAGNESIUM SERPL-MCNC: 2 MG/DL (ref 1.8–2.4)
MCH RBC QN AUTO: 25.2 PG (ref 26.1–32.9)
MCHC RBC AUTO-ENTMCNC: 31.5 G/DL (ref 31.4–35)
MCV RBC AUTO: 80 FL (ref 79.6–97.8)
MONOCYTES # BLD: 0.4 K/UL (ref 0.1–1.3)
MONOCYTES NFR BLD: 10 % (ref 4–12)
NEUTS SEG # BLD: 1.8 K/UL (ref 1.7–8.2)
NEUTS SEG NFR BLD: 50 % (ref 43–78)
PHOSPHATE SERPL-MCNC: 3.2 MG/DL (ref 2.3–3.7)
PLATELET # BLD AUTO: 109 K/UL (ref 150–450)
PMV BLD AUTO: 9.8 FL (ref 10.8–14.1)
POTASSIUM SERPL-SCNC: 3.5 MMOL/L (ref 3.5–5.1)
RBC # BLD AUTO: 4.16 M/UL (ref 4.05–5.25)
SODIUM SERPL-SCNC: 145 MMOL/L (ref 136–145)
TRIGL SERPL-MCNC: 155 MG/DL (ref 35–150)
VLDLC SERPL CALC-MCNC: 31 MG/DL (ref 6–23)
WBC # BLD AUTO: 3.5 K/UL (ref 4.3–11.1)

## 2018-07-10 PROCEDURE — 84100 ASSAY OF PHOSPHORUS: CPT | Performed by: INTERNAL MEDICINE

## 2018-07-10 PROCEDURE — 83036 HEMOGLOBIN GLYCOSYLATED A1C: CPT | Performed by: INTERNAL MEDICINE

## 2018-07-10 PROCEDURE — 74011636637 HC RX REV CODE- 636/637: Performed by: INTERNAL MEDICINE

## 2018-07-10 PROCEDURE — 74011250636 HC RX REV CODE- 250/636: Performed by: INTERNAL MEDICINE

## 2018-07-10 PROCEDURE — 74011000258 HC RX REV CODE- 258: Performed by: INTERNAL MEDICINE

## 2018-07-10 PROCEDURE — 80061 LIPID PANEL: CPT | Performed by: INTERNAL MEDICINE

## 2018-07-10 PROCEDURE — 83735 ASSAY OF MAGNESIUM: CPT | Performed by: INTERNAL MEDICINE

## 2018-07-10 PROCEDURE — 73502 X-RAY EXAM HIP UNI 2-3 VIEWS: CPT

## 2018-07-10 PROCEDURE — 85025 COMPLETE CBC W/AUTO DIFF WBC: CPT | Performed by: INTERNAL MEDICINE

## 2018-07-10 PROCEDURE — 65660000000 HC RM CCU STEPDOWN

## 2018-07-10 PROCEDURE — 74011250637 HC RX REV CODE- 250/637: Performed by: INTERNAL MEDICINE

## 2018-07-10 PROCEDURE — 99232 SBSQ HOSP IP/OBS MODERATE 35: CPT | Performed by: PSYCHIATRY & NEUROLOGY

## 2018-07-10 PROCEDURE — 82962 GLUCOSE BLOOD TEST: CPT

## 2018-07-10 PROCEDURE — 80048 BASIC METABOLIC PNL TOTAL CA: CPT | Performed by: INTERNAL MEDICINE

## 2018-07-10 PROCEDURE — 36415 COLL VENOUS BLD VENIPUNCTURE: CPT | Performed by: INTERNAL MEDICINE

## 2018-07-10 RX ORDER — INSULIN LISPRO 100 [IU]/ML
4 INJECTION, SOLUTION INTRAVENOUS; SUBCUTANEOUS
Status: DISCONTINUED | OUTPATIENT
Start: 2018-07-10 | End: 2018-07-11 | Stop reason: HOSPADM

## 2018-07-10 RX ORDER — METFORMIN HYDROCHLORIDE 500 MG/1
500 TABLET ORAL 2 TIMES DAILY WITH MEALS
Status: DISCONTINUED | OUTPATIENT
Start: 2018-07-10 | End: 2018-07-10

## 2018-07-10 RX ORDER — INSULIN GLARGINE 100 [IU]/ML
30 INJECTION, SOLUTION SUBCUTANEOUS DAILY
Status: DISCONTINUED | OUTPATIENT
Start: 2018-07-10 | End: 2018-07-11 | Stop reason: HOSPADM

## 2018-07-10 RX ORDER — METFORMIN HYDROCHLORIDE 500 MG/1
500 TABLET ORAL 2 TIMES DAILY WITH MEALS
Status: DISCONTINUED | OUTPATIENT
Start: 2018-07-11 | End: 2018-07-11 | Stop reason: HOSPADM

## 2018-07-10 RX ADMIN — CLOPIDOGREL BISULFATE 75 MG: 75 TABLET ORAL at 08:18

## 2018-07-10 RX ADMIN — EZETIMIBE 10 MG: 10 TABLET ORAL at 08:18

## 2018-07-10 RX ADMIN — INSULIN LISPRO 4 UNITS: 100 INJECTION, SOLUTION INTRAVENOUS; SUBCUTANEOUS at 12:27

## 2018-07-10 RX ADMIN — ATORVASTATIN CALCIUM 80 MG: 40 TABLET, FILM COATED ORAL at 08:18

## 2018-07-10 RX ADMIN — INSULIN LISPRO 2 UNITS: 100 INJECTION, SOLUTION INTRAVENOUS; SUBCUTANEOUS at 08:24

## 2018-07-10 RX ADMIN — CEFTRIAXONE SODIUM 1 G: 1 INJECTION, POWDER, FOR SOLUTION INTRAMUSCULAR; INTRAVENOUS at 05:37

## 2018-07-10 RX ADMIN — GABAPENTIN 600 MG: 300 CAPSULE ORAL at 16:46

## 2018-07-10 RX ADMIN — INSULIN GLARGINE 30 UNITS: 100 INJECTION, SOLUTION SUBCUTANEOUS at 08:23

## 2018-07-10 RX ADMIN — Medication 10 ML: at 05:37

## 2018-07-10 RX ADMIN — LEVOTHYROXINE SODIUM 100 MCG: 100 TABLET ORAL at 05:39

## 2018-07-10 RX ADMIN — GABAPENTIN 600 MG: 300 CAPSULE ORAL at 08:17

## 2018-07-10 RX ADMIN — INSULIN LISPRO 2 UNITS: 100 INJECTION, SOLUTION INTRAVENOUS; SUBCUTANEOUS at 12:28

## 2018-07-10 RX ADMIN — HEPARIN SODIUM 5000 UNITS: 5000 INJECTION, SOLUTION INTRAVENOUS; SUBCUTANEOUS at 08:21

## 2018-07-10 RX ADMIN — INSULIN LISPRO 8 UNITS: 100 INJECTION, SOLUTION INTRAVENOUS; SUBCUTANEOUS at 16:45

## 2018-07-10 RX ADMIN — HEPARIN SODIUM 5000 UNITS: 5000 INJECTION, SOLUTION INTRAVENOUS; SUBCUTANEOUS at 23:30

## 2018-07-10 RX ADMIN — INSULIN LISPRO 6 UNITS: 100 INJECTION, SOLUTION INTRAVENOUS; SUBCUTANEOUS at 23:30

## 2018-07-10 RX ADMIN — Medication 10 ML: at 16:47

## 2018-07-10 RX ADMIN — Medication 5 ML: at 23:29

## 2018-07-10 RX ADMIN — CYCLOBENZAPRINE HYDROCHLORIDE 10 MG: 10 TABLET, FILM COATED ORAL at 08:18

## 2018-07-10 RX ADMIN — INSULIN LISPRO 4 UNITS: 100 INJECTION, SOLUTION INTRAVENOUS; SUBCUTANEOUS at 08:23

## 2018-07-10 RX ADMIN — INSULIN LISPRO 4 UNITS: 100 INJECTION, SOLUTION INTRAVENOUS; SUBCUTANEOUS at 16:46

## 2018-07-10 RX ADMIN — INSULIN GLARGINE 50 UNITS: 100 INJECTION, SOLUTION SUBCUTANEOUS at 23:30

## 2018-07-10 RX ADMIN — GABAPENTIN 600 MG: 300 CAPSULE ORAL at 23:29

## 2018-07-10 RX ADMIN — ASPIRIN 81 MG: 81 TABLET, COATED ORAL at 08:17

## 2018-07-10 RX ADMIN — OXYBUTYNIN CHLORIDE 10 MG: 10 TABLET, FILM COATED, EXTENDED RELEASE ORAL at 08:17

## 2018-07-10 NOTE — PROGRESS NOTES
Impression:    Right paramedian pontine acute ischemic infarction     nonocclusive intracranial atherosclerosis    Insulin-dependent type 2 diabetes, uncontrolled     recommendation:    Continue aspirin 81 mg daily, aggressive statin therapy, blood sugar control. Discontinue Plavix    Interval history:    No complaints.   Continues to experience mild left-sided weakness      Current Facility-Administered Medications:     insulin glargine (LANTUS) injection 30 Units, 30 Units, SubCUTAneous, DAILY, Randy Roberts MD, 30 Units at 07/10/18 0823    insulin lispro (HUMALOG) injection 4 Units, 4 Units, SubCUTAneous, TIDAC, Nikita Saeed MD, 4 Units at 07/10/18 1646    [START ON 7/11/2018] metFORMIN (GLUCOPHAGE) tablet 500 mg, 500 mg, Oral, BID WITH MEALS, Randy Roberts MD    atorvastatin (LIPITOR) tablet 80 mg, 80 mg, Oral, DAILY, Beck Murray MD, 80 mg at 07/10/18 0818    cyclobenzaprine (FLEXERIL) tablet 10 mg, 10 mg, Oral, DAILY, Beck Murray MD, 10 mg at 07/10/18 0818    ezetimibe (ZETIA) tablet 10 mg, 10 mg, Oral, DAILY, Beck Murray MD, 10 mg at 07/10/18 0818    gabapentin (NEURONTIN) capsule 600 mg, 600 mg, Oral, TID, Beck Murray MD, 600 mg at 07/10/18 1646    levothyroxine (SYNTHROID) tablet 100 mcg, 100 mcg, Oral, ACB, Beck Murray MD, 100 mcg at 07/10/18 0539    oxybutynin chloride XL (DITROPAN XL) tablet 10 mg, 10 mg, Oral, DAILY, Beck Murray MD, 10 mg at 07/10/18 0817    traMADol (ULTRAM) tablet 50 mg, 50 mg, Oral, Q12H PRN, Beck Murray MD    sodium chloride (NS) flush 5-10 mL, 5-10 mL, IntraVENous, Q8H, Beck Murray MD, 10 mL at 07/10/18 1647    sodium chloride (NS) flush 5-10 mL, 5-10 mL, IntraVENous, PRN, Beck Murray MD    ondansetron [facility]) injection 4 mg, 4 mg, IntraVENous, Q6H PRN, Beck Murray MD    acetaminophen (TYLENOL) tablet 650 mg, 650 mg, Oral, Q4H PRN, Alva Stewart Marquita Barton MD, 650 mg at 07/09/18 2110    clopidogrel (PLAVIX) tablet 75 mg, 75 mg, Oral, DAILY, Vick Sr MD, 75 mg at 07/10/18 0818    bisacodyl (DULCOLAX) tablet 5 mg, 5 mg, Oral, DAILY PRN, Vick Sr MD    insulin lispro (HUMALOG) injection, , SubCUTAneous, AC&HS, Vick Sr MD, 8 Units at 07/10/18 1645    metoprolol (LOPRESSOR) injection 5 mg, 5 mg, IntraVENous, Q6H PRN, Vick Sr MD    cefTRIAXone (ROCEPHIN) 1 g in 0.9% sodium chloride (MBP/ADV) 50 mL, 1 g, IntraVENous, Q24H, Vick Sr MD, Last Rate: 100 mL/hr at 07/10/18 0537, 1 g at 07/10/18 0537    heparin (porcine) injection 5,000 Units, 5,000 Units, SubCUTAneous, Q12H, Reinaldo Feldman MD, 5,000 Units at 07/10/18 7059    aspirin delayed-release tablet 81 mg, 81 mg, Oral, DAILY, Reinaldo Feldman MD, 81 mg at 07/10/18 0817    insulin glargine (LANTUS) injection 50 Units, 50 Units, SubCUTAneous, QHS, Randy Sena MD, 50 Units at 07/09/18 2111    Allergies   Allergen Reactions    Codeine Other (comments)     lightheadedness    Tramadol Unknown (comments)     HEADACHE, ONLY IF TAKEN AT NIGHT      Visit Vitals    /77 (BP 1 Location: Left arm, BP Patient Position: At rest)    Pulse 99    Temp 98.9 °F (37.2 °C)    Resp 18    Ht 5' 7\" (1.702 m)    Wt 155 lb (70.3 kg)    SpO2 97%    BMI 24.28 kg/m2     Weakness of the lower half of the left side of the face is slightly more prominent than yesterday. Positive left pronator drift. Left infraspinatus weakness and slowness of fine motor movements    CTA of head and neck demonstrates nonocclusive atherosclerotic change.   The basilar artery is widely patent    Recent Results (from the past 24 hour(s))   GLUCOSE, POC    Collection Time: 07/09/18  8:59 PM   Result Value Ref Range    Glucose (POC) 308 (H) 65 - 100 mg/dL   LIPID PANEL    Collection Time: 07/10/18  6:07 AM   Result Value Ref Range    LIPID PROFILE Cholesterol, total 70 <200 MG/DL    Triglyceride 155 (H) 35 - 150 MG/DL    HDL Cholesterol 30 (L) 40 - 60 MG/DL    LDL, calculated 9 <100 MG/DL    VLDL, calculated 31 (H) 6.0 - 23.0 MG/DL    CHOL/HDL Ratio 2.3     HEMOGLOBIN A1C WITH EAG    Collection Time: 07/10/18  6:07 AM   Result Value Ref Range    Hemoglobin A1c 9.0 (H) 4.8 - 6.0 %    Est. average glucose 212 mg/dL   CBC WITH AUTOMATED DIFF    Collection Time: 07/10/18  6:07 AM   Result Value Ref Range    WBC 3.5 (L) 4.3 - 11.1 K/uL    RBC 4.16 4.05 - 5.25 M/uL    HGB 10.5 (L) 11.7 - 15.4 g/dL    HCT 33.3 (L) 35.8 - 46.3 %    MCV 80.0 79.6 - 97.8 FL    MCH 25.2 (L) 26.1 - 32.9 PG    MCHC 31.5 31.4 - 35.0 g/dL    RDW 15.5 (H) 11.9 - 14.6 %    PLATELET 246 (L) 612 - 450 K/uL    MPV 9.8 (L) 10.8 - 14.1 FL    DF AUTOMATED      NEUTROPHILS 50 43 - 78 %    LYMPHOCYTES 33 13 - 44 %    MONOCYTES 10 4.0 - 12.0 %    EOSINOPHILS 6 0.5 - 7.8 %    BASOPHILS 1 0.0 - 2.0 %    IMMATURE GRANULOCYTES 0 0.0 - 5.0 %    ABS. NEUTROPHILS 1.8 1.7 - 8.2 K/UL    ABS. LYMPHOCYTES 1.1 0.5 - 4.6 K/UL    ABS. MONOCYTES 0.4 0.1 - 1.3 K/UL    ABS. EOSINOPHILS 0.2 0.0 - 0.8 K/UL    ABS. BASOPHILS 0.0 0.0 - 0.2 K/UL    ABS. IMM.  GRANS. 0.0 0.0 - 0.5 K/UL   METABOLIC PANEL, BASIC    Collection Time: 07/10/18  6:07 AM   Result Value Ref Range    Sodium 145 136 - 145 mmol/L    Potassium 3.5 3.5 - 5.1 mmol/L    Chloride 112 (H) 98 - 107 mmol/L    CO2 25 21 - 32 mmol/L    Anion gap 8 7 - 16 mmol/L    Glucose 128 (H) 65 - 100 mg/dL    BUN 8 8 - 23 MG/DL    Creatinine 0.44 (L) 0.6 - 1.0 MG/DL    GFR est AA >60 >60 ml/min/1.73m2    GFR est non-AA >60 >60 ml/min/1.73m2    Calcium 9.1 8.3 - 10.4 MG/DL   MAGNESIUM    Collection Time: 07/10/18  6:07 AM   Result Value Ref Range    Magnesium 2.0 1.8 - 2.4 mg/dL   PHOSPHORUS    Collection Time: 07/10/18  6:07 AM   Result Value Ref Range    Phosphorus 3.2 2.3 - 3.7 MG/DL   GLUCOSE, POC    Collection Time: 07/10/18  7:34 AM   Result Value Ref Range    Glucose (POC) 168 (H) 65 - 100 mg/dL   GLUCOSE, POC    Collection Time: 07/10/18 12:08 PM   Result Value Ref Range    Glucose (POC) 177 (H) 65 - 100 mg/dL   GLUCOSE, POC    Collection Time: 07/10/18  4:24 PM   Result Value Ref Range    Glucose (POC) 324 (H) 65 - 100 mg/dL   GLUCOSE, POC    Collection Time: 07/10/18  4:38 PM   Result Value Ref Range    Glucose (POC) 304 (H) 65 - 100 mg/dL     .

## 2018-07-10 NOTE — PROGRESS NOTES
Patient admitted overnight due to left sided weakness ( mild) and slurred speech. Found to have an acute CVA in the R side of the krishan. Has very poorly controlled DM type II. Uses insulin TOujeo 20-0-50 at home. Will increase dose of insulin to lantus 30-0-50 and will titrate as necessary. Premeal humalog ( 4 U tid before meals ordered). Metformin BID ordered ( will start this drug on 7/11 as patient received IV contrast today). Seen by neurology. CTA ordered. NO acute arterial findings. On ASA and plavix. Admitted on IV ceftriaxone for possible UTI. Follow urine culture. Made inpatient today.  PMH, social history, family history, ROS reviewed an remains the same as per H&P typed on 7/9/18

## 2018-07-10 NOTE — PROGRESS NOTES
SPEECH PATHOLOGY NOTE:    Attempted to initiate speech assessment for mild dysarthria; however, transport arrived to take the patient for an x-ray. Will re-attempt at a later time/date.     Beny Calabrese MS, CCC-SLP

## 2018-07-10 NOTE — PROGRESS NOTES
Problem: Falls - Risk of  Goal: *Absence of Falls  Document Cecily Fall Risk and appropriate interventions in the flowsheet.    Outcome: Progressing Towards Goal  Fall Risk Interventions:  Mobility Interventions: Assess mobility with egress test, Bed/chair exit alarm, Communicate number of staff needed for ambulation/transfer, Patient to call before getting OOB         Medication Interventions: Assess postural VS orthostatic hypotension, Bed/chair exit alarm, Patient to call before getting OOB, Teach patient to arise slowly    Elimination Interventions: Bed/chair exit alarm, Call light in reach, Patient to call for help with toileting needs, Toilet paper/wipes in reach, Toileting schedule/hourly rounds

## 2018-07-10 NOTE — PROGRESS NOTES
Problem: Interdisciplinary Rounds  Goal: Interdisciplinary Rounds  Outcome: Progressing Towards Goal  Interdisciplinary team rounds were held 7/10/2018 with the following team members:Care Management, Physical Therapy, Physician and  and the patient. Anticipate discharge home with home health tomorrow. Plan of care discussed. See clinical pathway and/or care plan for interventions and desired outcomes.

## 2018-07-10 NOTE — PROGRESS NOTES
PT Note:    Chart reviewed and attempted PT treatment this PM. Pt off floor for MRI, will check back later time/date as schedule allows.     Thanks,  Dejan Diehl, MARISELT

## 2018-07-10 NOTE — PROGRESS NOTES
Spiritual Care visit. Initial Visit, Pre Surgery Consult. Visit and prayer by [email or web address].     Entered by Sarina Rios M.Ed., Th.B. ,Staff

## 2018-07-10 NOTE — PROGRESS NOTES
Hospitalist Progress Note Subjective: As Noted in the H&P:\" Ross Franz is a 80 y.o. white female, with a pm of Bell's Palsy and insulin dependent type 2 diabetes, who presents to the ED for sudden onset of slurred speech around dinnertime yesterday evening.  states she also had some mild gait disturbance. Speech a little improved since then but still unclear and with difficult speaking clearly. CT head WO in the ED negative for acute abnormality. She takes baby aspirin and lipitor at home. She denies chest pain, sob, nausea, diarrhea. UA shows possible UTI and her blood sugar is >350 on admission. She states she missed some insulin dosing yesterday. Full code Pcp: Dr. Jake Krueger Next of kin: \" 07/10/2018- pt seen - mri shows a cva. She had a fall out of the bed today and complaining of left hip pain and swelling- appears old but she states it is new. Past Medical History:  
Diagnosis Date  Chronic musculoskeletal pain  Cystocele with rectocele H/O  
 Essential hypertension 2/9/2016  GERD (gastroesophageal reflux disease) diet controlled  Hip fracture requiring operative repair ([facility] [address] 1/9/2015 H/O LFT HIP REPAIR  
 Hip fracture, left ([facility] [address] 1/7/2015  Hyperlipidemia  Hypothyroidism  Impaired mobility and ADLs  Pure hypercholesterolemia 2/9/2016  Type 2 diabetes mellitus without complication ([facility] [address] 0/1/9864 Past Surgical History:  
Procedure Laterality Date  HX COLONOSCOPY    
 HX DILATION AND CURETTAGE    
 HX HIP FRACTURE TX Left  HX MT AND BSO  HX TONSILLECTOMY  HX TUBAL LIGATION Family History Problem Relation Age of Onset  Cancer Mother  Cancer Father  Breast Cancer Neg Hx Social History Substance Use Topics  Smoking status: Never Smoker  Smokeless tobacco: Never Used  Alcohol use No  
   
Prior to Admission medications Medication Sig Start Date End Date Taking? Authorizing Provider  
vit a,c & e-lutein-minerals (VISION FORMULA, WITH LUTEIN,) tablet Take 1 Tab by mouth daily. Yes Historical Provider  
magnesium 250 mg tab Take 250 mg by mouth daily. Yes Historical Provider Alpha Lipoic Acid 600 mg cap Take 200 mg by mouth daily. Yes Historical Provider  
naproxen (NAPROSYN) 500 mg tablet Take 500 mg by mouth two (2) times daily (with meals). Yes Historical Provider  
gabapentin (NEURONTIN) 600 mg tablet TAKE ONE TABLET BY MOUTH THREE TIMES DAILY 6/12/18  Yes Lexie Espinoza MD  
HUMALOG KWIKPEN 100 unit/mL kwikpen INJECT 40  UNITS SUBCUTANEOUSLY AT BEDTIME 2/6/18  Yes Medardo Guzman MD  
ezetimibe (ZETIA) 10 mg tablet Take 1 Tab by mouth daily. 1/22/18  Yes Medardo Guzman MD  
cyclobenzaprine (FLEXERIL) 10 mg tablet Take 1 Tab by mouth daily. 1/22/18  Yes Medardo Guzman MD  
traMADol Michael Cocker) 50 mg tablet TAKE ONE TABLET BY MOUTH TWICE DAILY AS NEEDED FOR 30 DAYS 1/22/18  Yes Medardo Guzman MD  
glyBURIDE-metFORMIN (GLUCOVANCE) 5-500 mg per tablet Take 1 Tab by mouth Daily (before breakfast). Indications: type 2 diabetes mellitus 11/8/17  Yes Medardo Guzman MD  
atorvastatin (LIPITOR) 80 mg tablet Take 1 Tab by mouth daily. Indications: hyperlipidemia 11/8/17  Yes Medardo Guzman MD  
levothyroxine (SYNTHROID) 100 mcg tablet TAKE ONE TABLET BY MOUTH ONCE DAILY BEFORE BREAKFAST FOR HYPOTHYROIDISM 7/17/17  Yes Medardo Guzman MD  
MULTIVITAMIN PO Take  by mouth. Yes Historical Provider  
solifenacin (VESICARE) 10 mg tablet Take 10 mg by mouth daily. Historical Provider ONETOUCH ULTRA BLUE TEST STRIP strip USE TO TEST BLOOD SUGARS THREE TIMES A DAY 4/30/18   MD ALEE Rosenberg BULK, by Does Not Apply route. Historical Provider BD INSULIN PEN NEEDLE UF SHORT 31 gauge x 5/16\" ndle USE  TWICE DAILY AS DIRECTED WITH  INSULIN  PENS 1/2/18   MD LAUREL RosenbergOSTAR 300 unit/mL (1.5 mL) inpn INJECT 70  UNITS SUBCUTANEOUSLY ONCE DAILY WITH DINNER 1/2/18   Tg Sal MD  
oxybutynin chloride XL (DITROPAN XL) 10 mg CR tablet Take 1 Tab by mouth daily. 11/8/17   Tg Sal MD  
meloxicam (MOBIC) 15 mg tablet Take 1 Tab by mouth daily. 11/8/17   Tg Sal MD  
cyanocobalamin 1,000 mcg tablet Take 1,000 mcg by mouth daily. Historical Provider ONETOUCH ULTRA TEST strip TEST BLOOD SUGAR TWICE A   DAY 7/29/16   Tg Sal MD  
CALCIUM CARB/VIT D3/MINERALS (CALCIUM-VITAMIN D PO) Take  by mouth. Historical Provider Allergies Allergen Reactions  Codeine Other (comments) lightheadedness  Tramadol Unknown (comments) HEADACHE, ONLY IF TAKEN AT NIGHT Review of Systems: A comprehensive review of systems was negative except for that written in the History of Present Illness. 7/9/18 Denies alcohol, tobacco, illicit drug use Lives with  and pet dog No tattoos No recent foreign travel Objective: Intake and Output:   
  
07/08 1901 - 07/10 0700 In: Tana Carrillo [P.O.:400; I.V.:1494] Out: - Physical Exam:  
General: awake, alert, oriented Eyes; non icteric, EOMI Neck; supple CV: RRR Pulm; CTAB Abd; soft, non tender, active BS Neuro: no tongue deviation, understands what I am saying and follows commands but with slurring to her speech, 5/5 strength of all extremities without focal deficits Skin/ext: warm, dry, no obvious rashes nor lesions Data Review:  
Recent Results (from the past 24 hour(s)) GLUCOSE, POC Collection Time: 07/09/18  8:59 PM  
Result Value Ref Range Glucose (POC) 308 (H) 65 - 100 mg/dL LIPID PANEL Collection Time: 07/10/18  6:07 AM  
Result Value Ref Range LIPID PROFILE Cholesterol, total 70 <200 MG/DL Triglyceride 155 (H) 35 - 150 MG/DL  
 HDL Cholesterol 30 (L) 40 - 60 MG/DL  
 LDL, calculated 9 <100 MG/DL VLDL, calculated 31 (H) 6.0 - 23.0 MG/DL  
 CHOL/HDL Ratio 2.3 HEMOGLOBIN A1C WITH EAG  Collection Time: 07/10/18  6:07 AM  
Result Value Ref Range Hemoglobin A1c 9.0 (H) 4.8 - 6.0 % Est. average glucose 212 mg/dL CBC WITH AUTOMATED DIFF Collection Time: 07/10/18  6:07 AM  
Result Value Ref Range WBC 3.5 (L) 4.3 - 11.1 K/uL  
 RBC 4.16 4.05 - 5.25 M/uL  
 HGB 10.5 (L) 11.7 - 15.4 g/dL HCT 33.3 (L) 35.8 - 46.3 % MCV 80.0 79.6 - 97.8 FL  
 MCH 25.2 (L) 26.1 - 32.9 PG  
 MCHC 31.5 31.4 - 35.0 g/dL  
 RDW 15.5 (H) 11.9 - 14.6 % PLATELET 298 (L) 094 - 450 K/uL MPV 9.8 (L) 10.8 - 14.1 FL  
 DF AUTOMATED NEUTROPHILS 50 43 - 78 % LYMPHOCYTES 33 13 - 44 % MONOCYTES 10 4.0 - 12.0 % EOSINOPHILS 6 0.5 - 7.8 % BASOPHILS 1 0.0 - 2.0 % IMMATURE GRANULOCYTES 0 0.0 - 5.0 %  
 ABS. NEUTROPHILS 1.8 1.7 - 8.2 K/UL  
 ABS. LYMPHOCYTES 1.1 0.5 - 4.6 K/UL  
 ABS. MONOCYTES 0.4 0.1 - 1.3 K/UL  
 ABS. EOSINOPHILS 0.2 0.0 - 0.8 K/UL  
 ABS. BASOPHILS 0.0 0.0 - 0.2 K/UL  
 ABS. IMM. GRANS. 0.0 0.0 - 0.5 K/UL METABOLIC PANEL, BASIC Collection Time: 07/10/18  6:07 AM  
Result Value Ref Range Sodium 145 136 - 145 mmol/L Potassium 3.5 3.5 - 5.1 mmol/L Chloride 112 (H) 98 - 107 mmol/L  
 CO2 25 21 - 32 mmol/L Anion gap 8 7 - 16 mmol/L Glucose 128 (H) 65 - 100 mg/dL BUN 8 8 - 23 MG/DL Creatinine 0.44 (L) 0.6 - 1.0 MG/DL  
 GFR est AA >60 >60 ml/min/1.73m2 GFR est non-AA >60 >60 ml/min/1.73m2 Calcium 9.1 8.3 - 10.4 MG/DL MAGNESIUM Collection Time: 07/10/18  6:07 AM  
Result Value Ref Range Magnesium 2.0 1.8 - 2.4 mg/dL PHOSPHORUS Collection Time: 07/10/18  6:07 AM  
Result Value Ref Range Phosphorus 3.2 2.3 - 3.7 MG/DL  
GLUCOSE, POC Collection Time: 07/10/18  7:34 AM  
Result Value Ref Range Glucose (POC) 168 (H) 65 - 100 mg/dL GLUCOSE, POC Collection Time: 07/10/18 12:08 PM  
Result Value Ref Range Glucose (POC) 177 (H) 65 - 100 mg/dL GLUCOSE, POC Collection Time: 07/10/18  4:24 PM  
Result Value Ref Range  Glucose (POC) 324 (H) 65 - 100 mg/dL Assessment:  
 
Continue on remote telemetry for cva, continue secondary ppx, continue cva workup; follow up carotid US, echo, A1C and lipid panel. ST, PT, OT consults. Fall with left hip pain- check hip xray Type 2 diabetes mellitus without complication, with long-term current use of insulin ([facility] [address] (4/6/2017) continue SSI. Continue to hold oral diabetic agents UTI (urinary tract infection) (7/9/2018) Continue  rocephin. F/up uc - growing klebsiella- ID & sens pending Full code Ppx: subq heparin Anticipated date of dc: diogenes if ID and sensitivity back and 48hr of telemetry. PT recommending home pt Signed By: Suzie Bullock MD   
 July 10, 2018

## 2018-07-11 VITALS
SYSTOLIC BLOOD PRESSURE: 120 MMHG | TEMPERATURE: 98.5 F | HEART RATE: 91 BPM | WEIGHT: 155 LBS | RESPIRATION RATE: 18 BRPM | DIASTOLIC BLOOD PRESSURE: 61 MMHG | HEIGHT: 67 IN | BODY MASS INDEX: 24.33 KG/M2 | OXYGEN SATURATION: 99 %

## 2018-07-11 LAB
BACTERIA SPEC CULT: ABNORMAL
GLUCOSE BLD STRIP.AUTO-MCNC: 157 MG/DL (ref 65–100)
GLUCOSE BLD STRIP.AUTO-MCNC: 99 MG/DL (ref 65–100)
SERVICE CMNT-IMP: ABNORMAL

## 2018-07-11 PROCEDURE — 74011250636 HC RX REV CODE- 250/636: Performed by: INTERNAL MEDICINE

## 2018-07-11 PROCEDURE — 74011250637 HC RX REV CODE- 250/637: Performed by: INTERNAL MEDICINE

## 2018-07-11 PROCEDURE — 74011000258 HC RX REV CODE- 258: Performed by: INTERNAL MEDICINE

## 2018-07-11 PROCEDURE — 74011636637 HC RX REV CODE- 636/637: Performed by: INTERNAL MEDICINE

## 2018-07-11 PROCEDURE — 97530 THERAPEUTIC ACTIVITIES: CPT

## 2018-07-11 PROCEDURE — 82962 GLUCOSE BLOOD TEST: CPT

## 2018-07-11 RX ORDER — SAME BUTANEDISULFONATE/BETAINE 400-600 MG
250 POWDER IN PACKET (EA) ORAL 2 TIMES DAILY
Qty: 14 CAP | Refills: 0 | Status: SHIPPED | OUTPATIENT
Start: 2018-07-11 | End: 2018-07-18

## 2018-07-11 RX ORDER — CEPHALEXIN 500 MG/1
500 CAPSULE ORAL 2 TIMES DAILY
Qty: 6 CAP | Refills: 0 | Status: SHIPPED | OUTPATIENT
Start: 2018-07-11 | End: 2018-07-14

## 2018-07-11 RX ORDER — ASPIRIN 81 MG/1
81 TABLET ORAL DAILY
Qty: 30 TAB | Refills: 0 | Status: SHIPPED | OUTPATIENT
Start: 2018-07-12

## 2018-07-11 RX ADMIN — METFORMIN HYDROCHLORIDE 500 MG: 500 TABLET, FILM COATED ORAL at 08:56

## 2018-07-11 RX ADMIN — LEVOTHYROXINE SODIUM 100 MCG: 100 TABLET ORAL at 05:36

## 2018-07-11 RX ADMIN — ATORVASTATIN CALCIUM 80 MG: 40 TABLET, FILM COATED ORAL at 08:36

## 2018-07-11 RX ADMIN — GABAPENTIN 600 MG: 300 CAPSULE ORAL at 08:36

## 2018-07-11 RX ADMIN — Medication 10 ML: at 05:38

## 2018-07-11 RX ADMIN — CEFTRIAXONE SODIUM 1 G: 1 INJECTION, POWDER, FOR SOLUTION INTRAMUSCULAR; INTRAVENOUS at 05:35

## 2018-07-11 RX ADMIN — OXYBUTYNIN CHLORIDE 10 MG: 10 TABLET, FILM COATED, EXTENDED RELEASE ORAL at 08:36

## 2018-07-11 RX ADMIN — INSULIN GLARGINE 30 UNITS: 100 INJECTION, SOLUTION SUBCUTANEOUS at 08:52

## 2018-07-11 RX ADMIN — EZETIMIBE 10 MG: 10 TABLET ORAL at 08:36

## 2018-07-11 RX ADMIN — HEPARIN SODIUM 5000 UNITS: 5000 INJECTION, SOLUTION INTRAVENOUS; SUBCUTANEOUS at 08:37

## 2018-07-11 RX ADMIN — ASPIRIN 81 MG: 81 TABLET, COATED ORAL at 08:36

## 2018-07-11 RX ADMIN — CYCLOBENZAPRINE HYDROCHLORIDE 10 MG: 10 TABLET, FILM COATED ORAL at 08:36

## 2018-07-11 NOTE — PROGRESS NOTES
CM modified HH order to include PT/OT/ST after discharge.  New facetoface completed and routed to Dr. Manuel Ibarra, hospitalist.

## 2018-07-11 NOTE — PROGRESS NOTES
[facility] - INPATIENT  Face to Face Encounter    Patients Name: Sharron Ruggiero    YOB: 1950    Ordering Physician: Dr. Finn Bazzi    Primary Diagnosis: Slurred speech  CVA (cerebral vascular accident) [facility])    Date of Face to Face:   7/11/2018                                  Face to Face Encounter findings are related to primary reason for home care:   yes. 1. I certify that the patient needs intermittent care as follows: physical therapy: strengthening, stretching/ROM, transfer training, gait/stair training, balance training and pt/caregiver education  occupational therapy:  ADL safety (ie. cooking, bathing, dressing), ROM and pt/caregiver education  speech therapy:  cognition training and pt/caregiver education    2. I certify that this patient is homebound, that is: 1) patient requires the use of a walker device, special transportation, or assistance of another to leave the home; or 2) patient's condition makes leaving the home medically contraindicated; and 3) patient has a normal inability to leave the home and leaving the home requires considerable and taxing effort. Patient may leave the home for infrequent and short duration for medical reasons, and occasional absences for non-medical reasons. Homebound status is due to the following functional limitations: Patient with strength deficits limiting the performance of all ADL's without caregiver assistance or the use of an assistive device. Patient with poor safety awareness and is at risk for falls without assistance of another person and the use of an assistive device. Patient with poor ambulation endurance limiting their safe ability to ascend/descend the required number of steps to leave the home.     3. I certify that this patient is under my care and that I, or a nurse practitioner or  [id number], or clinical nurse specialist, or certified nurse midwife, working with me, had a Face-to-Face Encounter that meets the physician Face-to-Face Encounter requirements. The following are the clinical findings from the [facility] encounter that support the need for skilled services and is a summary of the encounter: See hospital chart. See hospital chart. Lucas Arpita  7/11/2018      THE FOLLOWING TO BE COMPLETED BY THE COMMUNITY PHYSICIAN:    I concur with the findings described above from the F2F encounter that this patient is homebound and in need of a skilled service.     Certifying Physician: _____________________________________      Printed Certifying Physician Name: _____________________________________    Date: _________________

## 2018-07-11 NOTE — PROGRESS NOTES
Problem: Mobility Impaired (Adult and Pediatric)  Goal: *Acute Goals and Plan of Care (Insert Text)  LTG:  (1.)Ms. Solitario Shah will move from supine to sit and sit to supine , scoot up and down and roll side to side in bed with INDEPENDENT within 7 treatment day(s). Goal met 7/11/2018  (2.)Ms. Solitario Shah will transfer from bed to chair and chair to bed with MODIFIED INDEPENDENCE using the least restrictive device within 7 treatment day(s). (3.)Ms. Huang will ambulate with MODIFIED INDEPENDENCE for 250+ feet with the least restrictive device within 7 treatment day(s). (4.)Ms. Huang will ascend/descend 2 steps with no handrail with SUPERVISION within 7 treatment days. ________________________________________________________________________________________________      PHYSICAL THERAPY: Daily Note, Treatment Day: 1st, AM 7/11/2018  INPATIENT: Hospital Day: 3  Payor: Radha Baker / Plan: [address] [place] [address] HMO / Product Type: [employer] Medicare /      NAME/AGE/GENDER: Shruthi Romero is a 80 y.o. female   PRIMARY DIAGNOSIS: Slurred speech  CVA (cerebral vascular accident) ([facility] [address] Slurred speech Slurred speech        ICD-10: Treatment Diagnosis:    · Difficulty in walking, Not elsewhere classified (R26.2)   Precaution/Allergies:  Codeine and Tramadol      ASSESSMENT:     Ms. Solitario Shah is supine in bed upon contact, and agreeable to PT Treatment. Pt reports no pain currently. Pt transferred from supine to sitting EOB independently. Pt performed STS with SBA and RW, and ambulated 100ft with SBA and RW to PT gym, demonstrating slow, shuffling gait with decreased step clearance and narrow GILES and requiring occasional verbal cuing for RW technique. Pt ascended/descend 5 steps 4x with one hand rail and SBA, with occasional verbal cuing for sequencing and safety awareness.  Pt attempted steps with no hand rail, but appeared unsteady, so PT recommended having supervision before attempting stairs without a handrail upon return to home (pt has 2 steps to enter front door with no handrail, and 6 steps to enter back door with a hand rail). Pt ambulated additional 100ft to bedside chair with RW and SBA. Pt performed additional STS and required setup for brief change but demonstrated improved dynamic balance with RW support throughout. Pt left upright in chair with all needs met and within reach. Pt is making progress towards all goals, as evident with increased ambulation distance and safety with RW and met bed mobility goal today. This section established at most recent assessment   PROBLEM LIST (Impairments causing functional limitations):  1. Decreased Transfer Abilities  2. Decreased Ambulation Ability/Technique  3. Decreased Balance  4. Decreased Activity Tolerance  5. Decreased Pacing Skills  6. Decreased Radisson with Home Exercise Program   INTERVENTIONS PLANNED: (Benefits and precautions of physical therapy have been discussed with the patient.)  1. Balance Exercise  2. Bed Mobility  3. Family Education  4. Gait Training  5. Home Exercise Program (HEP)  6. Manual Therapy  7. Neuromuscular Re-education/Strengthening  8. Range of Motion (ROM)  9. Therapeutic Activites  10. Therapeutic Exercise/Strengthening  11. Transfer Training  12. Group Therapy     TREATMENT PLAN: Frequency/Duration: 3 times a week for duration of hospital stay  Rehabilitation Potential For Stated Goals: Good     RECOMMENDED REHABILITATION/EQUIPMENT: (at time of discharge pending progress): Due to the probability of continued deficits (see above) this patient will likely need continued skilled physical therapy after discharge. Equipment:    None at this time              HISTORY:   History of Present Injury/Illness (Reason for Referral):  Lora Mcgrath is a 80 y.o. white female, with a pm of Bell's Palsy and insulin dependent type 2 diabetes, who presents to the ED for sudden onset of slurred speech around dinnertime yesterday evening.   states she also had some mild gait disturbance. Speech a little improved since then but still unclear and with difficult speaking clearly. CT head WO in the ED negative for acute abnormality. She takes baby aspirin and lipitor at home. She denies chest pain, sob, nausea, diarrhea. UA shows possible UTI and her blood sugar is >350 on admission. She states she missed some insulin dosing yesterday  Past Medical History/Comorbidities:   Ms. Berenice Ram  has a past medical history of Chronic musculoskeletal pain; Cystocele with rectocele; Essential hypertension (2/9/2016); GERD (gastroesophageal reflux disease); Hip fracture requiring operative repair ([facility] [address] (1/9/2015); [facility] (1/7/2015); Hyperlipidemia; Hypothyroidism; Impaired mobility and ADLs; Pure hypercholesterolemia (2/9/2016); and Type 2 diabetes mellitus without complication ([facility] [address] (7/2/0076). Ms. Berenice Ram  has a past surgical history that includes hx tonsillectomy; hx tubal ligation; hx dilation and curettage; hx colonoscopy; hx magali and bso; and hx hip fracture tx (Left). Social History/Living Environment:   Home Environment: Private residence  # Steps to Enter: 2  Rails to Enter: No  One/Two Story Residence: One story  Living Alone: No  Support Systems: Spouse/Significant Other/Partner, Family member(s)  Patient Expects to be Discharged to[de-identified] Private residence  Current DME Used/Available at Home: Walker, rolling  Tub or Shower Type: Tub/Shower combination  Prior Level of Function/Work/Activity:  Independent, living with  and daughter, taking care of sick , no falls but often feels \"unsteady\" when walking   Number of Personal Factors/Comorbidities that affect the Plan of Care: 3+: HIGH COMPLEXITY   EXAMINATION:   Most Recent Physical Functioning:   Gross Assessment:  AROM: Within functional limits  Strength:  Within functional limits  Coordination: Within functional limits  Tone: Normal  Sensation: Intact (tingling in feet but intact sensation)               Posture:     Balance:  Sitting: Intact; Without support  Standing: Impaired  Standing - Static: Fair  Standing - Dynamic : Fair Bed Mobility:  Rolling: Supervision  Supine to Sit: Supervision  Scooting: Supervision  Wheelchair Mobility:     Transfers:  Sit to Stand: Stand-by assistance  Stand to Sit: Stand-by assistance  Gait:     Base of Support: Narrowed  Speed/Rocio: Shuffled; Slow  Step Length: Left shortened;Right shortened  Gait Abnormalities: Decreased step clearance  Distance (ft): 200 Feet (ft)  Assistive Device: Walker, rolling  Ambulation - Level of Assistance: Stand-by assistance  Number of Stairs Trained: 5 (4x)  Stairs - Level of Assistance: Stand-by assistance  Rail Use: Right   Interventions: Safety awareness training;Verbal cues      Body Structures Involved:  1. Heart  2. Lungs  3. Bones  4. Joints  5. Muscles  6. Ligaments Body Functions Affected:  1. Cardio  2. Respiratory  3. Neuromusculoskeletal  4. Movement Related Activities and Participation Affected:  1. Mobility  2. Self Care  3. Domestic Life  4. Interpersonal Interactions and Relationships  5. Community, Social and [place]   Number of elements that affect the Plan of Care: 4+: HIGH COMPLEXITY   CLINICAL PRESENTATION:   Presentation: Stable and uncomplicated: LOW COMPLEXITY   CLINICAL DECISION MAKIN [facility] Short Form  How much difficulty does the patient currently have. .. Unable A Lot A Little None   1. Turning over in bed (including adjusting bedclothes, sheets and blankets)? [] 1   [] 2   [] 3   [x] 4   2. Sitting down on and standing up from a chair with arms ( e.g., wheelchair, bedside commode, etc.)   [] 1   [] 2   [] 3   [x] 4   3. Moving from lying on back to sitting on the side of the bed? [] 1   [] 2   [] 3   [x] 4   How much help from another person does the patient currently need. .. Total A Lot A Little None   4.   Moving to and from a bed to a chair (including a wheelchair)? [] 1   [] 2   [] 3   [x] 4   5. Need to walk in hospital room? [] 1   [] 2   [x] 3   [] 4   6. Climbing 3-5 steps with a railing? [] 1   [x] 2   [] 3   [] 4   © 2007, Trustees of [address] [place] [address] [address] [address], under license to [employer]. All rights reserved      Score:  Initial: 19 Most Recent: 21 (Date: 7/11/2018 )    Interpretation of Tool:  Represents activities that are increasingly more difficult (i.e. Bed mobility, Transfers, Gait). Score 24 23 22-20 19-15 14-10 9-7 6     Modifier CH CI CJ CK CL CM CN      ? Mobility - Walking and Moving Around:     - CURRENT STATUS: CJ - 20%-39% impaired, limited or restricted    - GOAL STATUS: CJ - 20%-39% impaired, limited or restricted    - D/C STATUS:  ---------------To be determined---------------  Payor: Rayshawn Zapien / Plan: [address] [place] [address] / Product Type: Managed Care Medicare /      Medical Necessity:     · Patient demonstrates good rehab potential due to higher previous functional level. Reason for Services/Other Comments:  · Patient continues to require skilled intervention due to impaired functional mobility and balance. Use of outcome tool(s) and clinical judgement create a POC that gives a: Clear prediction of patient's progress: LOW COMPLEXITY            TREATMENT:   (In addition to Assessment/Re-Assessment sessions the following treatments were rendered)   Pre-treatment Symptoms/Complaints:  \"I've been ready to go home\"  Pain: Initial: 0/10  Pain Intensity 1: 0  Post Session:  0/10     Therapeutic Activity: (    24 minutes): Therapeutic activities including Bed transfers, Chair transfers, Ambulation on level ground, Stairs and education on RW technique, pacing, and safety awareness to improve mobility, strength, balance and coordination. Required minimal Safety awareness training;Verbal cues to promote dynamic balance in standing.        Braces/Orthotics/Lines/Etc:   · O2 Device: Room air  Treatment/Session Assessment:    · Response to Treatment:  Ambulated 200ft with SBA and RW, ascended/descend 5steps 4x with SBA and one handrail  · Interdisciplinary Collaboration:   o Physical Therapist  o Registered Nurse  · After treatment position/precautions:   o Up in chair  o Bed/Chair-wheels locked  o Bed in low position  o Call light within reach   · Compliance with Program/Exercises: Will assess as treatment progresses. · Recommendations/Intent for next treatment session: \"Next visit will focus on advancements to more challenging activities and reduction in assistance provided\".   Total Treatment Duration:  PT Patient Time In/Time Out  Time In: 1018  Time Out: 1042    Emely Adames, PT

## 2018-07-11 NOTE — PROGRESS NOTES
Ischemic Stroke without Activase/TIA    VTE Prophylaxis: Yes: heparin    Antiplatelet: No    Statin if LDL Greater Than or Equal to100: No    BP Parameters: Less Than 220/120 for 24 hours, then consult MD for parameters    Controlled With: None    Dysphagia Screen Completed: Yes: Pass    Patient has PEG, NG Tube, Feeding Tube: No    Medication orders per above route: Yes    Nutrition Status: PO    NIH Stroke Scale Complete: Yes: yes    Frequency of Vital Signs: Every 4 hours     Frequency of Neuro Checks: Every 4 hours    Daily Education/Care Plan Updated: Yes    Miguel Woo RN

## 2018-07-11 NOTE — PROGRESS NOTES
Held insulin Humalog 4 units three times daily this morning due to administration instructions of hold if under 100. Pt has a blood sugar of 99.

## 2018-07-11 NOTE — DISCHARGE SUMMARY
Hospitalist Discharge Summary     Admit Date:  2018  1:31 AM   Name:  Waylon Bull   Age:  80 y.o.  :  1950   MRN:  [id number]   PCP:  Bennett Seay MD  Treatment Team: Attending Provider: Judith Trevino MD; Utilization Review: Carri Sunshine RN; Consulting Provider: Gume Anderson MD; Care Manager: Alden Varma; Speech Language Pathologist: ERICK Lubin    Problem List for this Hospitalization:  Hospital Problems as of 2018  Date Reviewed: 2018          Codes Class Noted - Resolved POA    * (Principal)Slurred speech ICD-10-CM: A42.56  ICD-9-CM: 784.59  2018 - Present Unknown        UTI (urinary tract infection) ICD-10-CM: N39.0  ICD-9-CM: 599.0  2018 - Present Yes        CVA (cerebral vascular accident) [facility]) ICD-10-CM: I63.9  ICD-9-CM: 434.91  2018 - Present Unknown        Type 2 diabetes mellitus without complication, with long-term current use of insulin ([facility] [address] ICD-10-CM: E11.9, Z79.4  ICD-9-CM: 250.00, V58.67  2017 - Present Yes        Pure hypercholesterolemia ICD-10-CM: E78.00  ICD-9-CM: 272.0  2016 - Present                 Admission HPI from 2018:    \" Waylon Bull is a 80 y.o. white female, with a pm of Bell's Palsy and insulin dependent type 2 diabetes, who presents to the ED for sudden onset of slurred speech around dinnertime yesterday evening.  states she also had some mild gait disturbance. Speech a little improved since then but still unclear and with difficult speaking clearly. CT head WO in the ED negative for acute abnormality. She takes baby aspirin and lipitor at home. She denies chest pain, sob, nausea, diarrhea. UA shows possible UTI and her blood sugar is >350 on admission. She states she missed some insulin dosing yesterday. \"    Hospital Course: The patient was admitted to the hospital. Workup revealed an  Acute or subacute small right pontine infarct and a uti secondary to Klebsiella.  She was seen By neurology- who recommended Asprin, and dm and htn control. She was seen by pt/ot/ slp and recommended for outpatient follow up. She had a fall with left hip pain. xrays were done and no fracture noted. She is stable for discharge to home to follow up her PCP. Her Naprosyn has been stopped as we are starting aspirin on discharge. Follow up instructions below. Plan was discussed with patient. All questions answered. Patient was stable at time of discharge and was instructed to call or return if there are any concerns or recurrence of symptoms. Diagnostic Imaging/Tests:   Xr Hip Lt W Or Wo Pelv 2-3 Vws    Result Date: 7/10/2018  Exam:  AP pelvis and left hip radiographs History:  pain, fall with pain and swelling- please evaluate for fracture, 80 years Female Comparison: Left hip radiographs December 14, 2016 Findings:  No evidence of acute fracture or dislocation. Again visualized is evidence of left hip total arthroplasty, which appears to remain in anatomic alignment, without evidence of hardware complication. Mild degenerative changes of the right hip joint are again visualized. Normal alignment. Persistent mild diffuse osteopenia. Visualized soft tissues otherwise unremarkable. Impression:  No evidence of acute injury. Chronic postoperative findings as above. Mri Brain Wo Cont    Result Date: 7/9/2018  MRI Brain Without Contrast Dated  July 9, 2018 Reference Exam: CT scan same day Indication: Slurred speech Technique: Routine MRI of the brain was performed using sagittal, axial, and coronal images. Findings:  The ventricular system, basilar cisterns, and cortical sulci all are normal in size and position for the patient's age. There are no abnormal intracranial masses, mass effect, nor extra-axial collections seen. There are no abnormal areas of signal intensity that would indicate a recent intracranial hemorrhage.  However diffusion-weighted imaging does show an area of increased signal within the right side of the krishan, with corresponding ADC map low signal.     Impression: Acute or subacute small right pontine infarct. [facility] The critical results contained in this report were communicated to the patient's nurse Alayna Atkins by Dr. Saima Keller on July 9, 2018 at 7:29 AM. Critical results were communicated as outlined in Section II.C.2.a.i of the ACR Practice Guideline for Communication of Diagnostic Imaging Findings. Ct Head Wo Cont    Result Date: 7/9/2018  CT Brain dated 7/9/2018  Comparison: None Clinical Information:  Dysarthria  5 mm axial images were obtained from skull base to vertex without contrast. Radiation dose reduction techniques were used for this study. Our scanners use one or all of the following:  Automated exposure control, adjustment of the mA and/or kV according to patient size, iterative reconstruction. Findings: Ventricles, sulci, and cisterns are normal in size. No midline shift. No mass, mass-effect, hemorrhage, or other focal abnormality  in the brain. No extra-axial abnormality. No skull fracture. Mastoid air cells and visualized paranasal sinuses are aerated and unremarkable. Impression: No Acute Abnormality     Cta Head Neck W Wo Cont    Result Date: 7/9/2018  History: Pontine infarction. FINDINGS: CT angiography was performed of the neck and head with contrast and three-dimensional CT angiography reconstruction and reformat was performed. NASCET criteria as needed. CT dose reduction was achieved through use of a standardized protocol tailored for this examination and automatic exposure control for dose modulation. The vertebral arteries are patent bilaterally. There is a hypoplastic P1 segment on the left. Patent posterior communicating arteries bilaterally. The left posterior communicating artery provides the dominant flow to the left posterior cerebral artery. Basilar artery is widely patent. The middle cerebral arteries are patent bilaterally.  The anterior cerebral arteries are patent. Atherosclerosis in the carotid siphon bilaterally without significant stenosis. Atherosclerosis at the right carotid bulb without stenosis. The right common carotid artery is patent. The innominate artery is patent. The left common carotid artery is patent. Atherosclerosis at the left carotid bulb without stenosis. The dural venous sinuses are patent. IMPRESSION: Atherosclerosis. No acute arterial findings. Xr Chest Port    Result Date: 7/9/2018  Portable AP upright chest dated 7/9/2018 at 0134 hours Comparison exam 10/31/2016 CLINICAL INFORMATION: Dizziness Heart is upper limits normal in size. Thoracic aorta is mildly tortuous. Lungs are clear and pulmonary vascularity normal. No pleural effusion. IMPRESSION: No acute abnormality    Duplex Carotid Bilateral    Result Date: 7/9/2018  TITLE:  Carotid and Vertebral Ultrasound Examination. INDICATION:  Speech disturbance, dysarthria, dyslipidemia, hypertension, transient ischemic attack, cerebrovascular accident. Abnormal brain MRI 7/9/2018--acute or subacute small right pontine infarct. TECHNIQUE: Grayscale, Color Doppler, and Spectral Doppler Ultrasound interrogation performed. Peak Systolic Velocity, ICA-to-CCA ratio, and End-Diastolic Velocity are recorded. The estimate of stenosis is inferred from velocity measurements cross referenced to published correlations as defined by the Society of Radiologists in [address] [place] [address] Drive Radiology 2003; 229; 340-346. COMPARISON: None. RIGHT NECK:  The peak systolic velocity in the Common Carotid Artery = 56 cm/sec; Internal Carotid Artery = 71 cm/sec. Ratio = 1.3.  Relatively flat, echogenic and shadowing plaque in the carotid bulb. No significant waveform broadening. Anterograde flow in the vertebral artery. LEFT  NECK:  The peak systolic velocity in the Common Carotid Artery = 56 cm/sec; Internal Carotid Artery = 86 cm/sec. Ratio = 1.5.   Carlton, echogenic and shadowing plaque in the carotid bulb. No significant waveform broadening. Anterograde flow in the vertebral artery. IMPRESSION:  CHARLY:  No hemodynamically significant stenosis. LICA:  No hemodynamically significant stenosis. There is atherosclerotic disease in both internal carotid arteries. Echocardiogram results:  Results for orders placed or performed during the hospital encounter of 18   2D ECHO COMPLETE ADULT (TTE) W OR WO CONTR    Narrative    Joe  [address] [facility] [address], [address] [facility]  [phone]    Transthoracic Echocardiogram  2D, M-mode, Doppler, and Color Doppler    Patient: Hannah Herring  MR #: [id number]  : 1950  Age: 80 years  Gender: Female  Study date: 2018  Account #: [de-identified]  Height: 67 in  Weight: 154.7 lb  BSA: 1.81 mï¾²  Status:Routine  Location: 3  BP: 146/ 75    Allergies: CODEINE, TRAMADOL    Sonographer:  Josse Wright RD  Group:  [id number]  Referring Physician:  Jay Lee MD  Reading Physician:  Sukhi Mcdonald. MD [facility]    INDICATIONS: TIA/ CVA    PROCEDURE: This was a routine study. A transthoracic echocardiogram was  performed. The study included complete 2D imaging, M-mode, complete spectral  Doppler, and color Doppler. Intravenous contrast (agitated saline, 9 ml) was  administered to evaluate possible R-L intracardiac shunting. Intravenous  contrast (Definity, 2 ml) was administered to opacify the left ventricle. Image  quality was adequate. LEFT VENTRICLE: Size was normal. Systolic function was normal. Ejection  fraction was estimated in the range of 60 % to 65 %. There were no regional  wall motion abnormalities.  Wall thickness was normal. Left ventricular  diastolic function parameters were normal. Average E/e'- 7.    RIGHT VENTRICLE: The size was normal. Systolic function was normal. The  tricuspid jet envelope definition was inadequate for estimation of RV systolic  pressure. LEFT ATRIUM: Size was normal.    ATRIAL SEPTUM: There was no left-to-right shunt and no right-to-left shunt. RIGHT ATRIUM: Size was normal.    SYSTEMIC VEINS: IVC: The inferior vena cava was not well visualized. AORTIC VALVE: The valve was probably trileaflet. There was no evidence for  stenosis. There was no insufficiency. MITRAL VALVE: Valve structure was normal. There was no evidence for stenosis. There was trivial regurgitation. TRICUSPID VALVE: The valve structure was normal. There was no evidence for  stenosis. There was mild regurgitation. PULMONIC VALVE: Not well visualized. There was no evidence for stenosis. There  was no insufficiency. PERICARDIUM: There was no pericardial effusion. AORTA: The root exhibited normal size. SUMMARY:    -  Left ventricle: Systolic function was normal. Ejection fraction was  estimated in the range of 60 % to 65 %. There were no regional wall motion  abnormalities. -  Atrial septum: There was no left-to-right shunt and no right-to-left   shunt.    -  Tricuspid valve: There was mild regurgitation.    -  Pericardium: There was no pericardial effusion. SYSTEM MEASUREMENT TABLES    2D mode  AoR Diam (2D): 2.6 cm  LA Dimension (2D): 3.6 cm  Left Atrium Systolic Volume Index; Method of Disks, Biplane; 2D mode;: 23.9  ml/m2  IVS/LVPW (2D): 1  IVSd (2D): 1.1 cm  LVIDd (2D): 3.7 cm  LVIDs (2D): 2.4 cm  LVOT Area (2D): 3.1 cm2  LVPWd (2D): 1.1 cm    Tissue Doppler Imaging  LV Peak Early Carballo Tissue Jonny; Mean; Lateral MA (TDI): 12.3 cm/s  LV Peak Early Carballo Tissue Jonny; Medial MA (TDI): 6.6 cm/s    Unspecified Scan Mode  Peak Grad; Mean; Antegrade Flow: 7 mm[Hg]  Vmax; Antegrade Flow: 136 cm/s  LVOT Diam: 2 cm  MV Peak Jonny/LV Peak Tissue Jonny E-Wave; Lateral MA: 5  MV Peak Jonny/LV Peak Tissue Jonny E-Wave; Medial MA: 9.4    Prepared and signed by    Candy George.  MD [facility]  Signed 09-Jul-2018 14:54:48           All Micro Results Procedure Component Value Units Date/Time    CULTURE, URINE [[id number]]  (Abnormal)  (Susceptibility) Collected:  07/09/18 0212    Order Status:  Completed Specimen:  Urine from Clean catch Updated:  07/11/18 0652     Special Requests: NO SPECIAL REQUESTS        Culture result:         >100,000 COLONIES/mL KLEBSIELLA PNEUMONIAE (A)          Labs: Results:       BMP, Mg, Phos Recent Labs      07/10/18   0607 07/09/18   0145   NA  145  141   K  3.5  4.1   CL  112*  105   CO2  25  26   AGAP  8  10   BUN  8  14   CREA  0.44*  0.86   CA  9.1  9.8   GLU  128*  378*   MG  2.0   --    PHOS  3.2   --       CBC Recent Labs      07/10/18   0607  07/09/18   0145   WBC  3.5*  4.5   RBC  4.16  4.04*   HGB  10.5*  10.3*   HCT  33.3*  32.3*   PLT  109*  122*   GRANS  50  58   LYMPH  33  27   EOS  6  3   MONOS  10  12   BASOS  1  0   IG  0  0   ANEU  1.8  2.6   ABL  1.1  1.2   KARO  0.2  0.1   ABM  0.4  0.6   ABB  0.0  0.0   AIG  0.0  0.0      LFT Recent Labs      07/09/18   0145   SGOT  52*   ALT  58   AP  120   TP  6.9   ALB  3.0*   GLOB  3.9*   AGRAT  0.8*      Cardiac Testing Lab Results   Component Value Date/Time    Troponin-I, Qt. <0.02 (L) 07/09/2018 01:45 AM      Coagulation Tests Lab Results   Component Value Date/Time    Prothrombin time 14.1 07/09/2018 01:36 AM    Prothrombin time 10.9 01/07/2015 08:35 PM    Prothrombin time 10.7 01/07/2015 09:15 AM    INR 1.1 07/09/2018 01:36 AM    INR 1.0 01/07/2015 08:35 PM    INR 1.0 01/07/2015 09:15 AM    aPTT 25.7 01/07/2015 09:15 AM      A1c Lab Results   Component Value Date/Time    Hemoglobin A1c 9.0 (H) 07/10/2018 06:07 AM    Hemoglobin A1c 8.5 (H) 05/04/2018 07:04 AM    Hemoglobin A1c 8.3 (H) 02/02/2018 07:30 AM    Hemoglobin A1c, External 8.1 06/09/2015      Lipid Panel Lab Results   Component Value Date/Time    Cholesterol, total 70 07/10/2018 06:07 AM    HDL Cholesterol 30 (L) 07/10/2018 06:07 AM    LDL, calculated 9 07/10/2018 06:07 AM    VLDL, calculated 31 (H) 07/10/2018 06:07 AM    Triglyceride 155 (H) 07/10/2018 06:07 AM    CHOL/HDL Ratio 2.3 07/10/2018 06:07 AM      Thyroid Panel Lab Results   Component Value Date/Time    TSH 3.720 02/02/2018 07:30 AM        Most Recent UA Lab Results   Component Value Date/Time    Color YELLOW 01/07/2015 11:19 AM    Appearance CLEAR 01/07/2015 11:19 AM    Specific gravity 1.020 01/07/2015 11:19 AM    pH (UA) 6.0 01/07/2015 11:19 AM    Protein NEGATIVE  01/07/2015 11:19 AM    Glucose 100 01/07/2015 11:19 AM    Ketone NEGATIVE  01/07/2015 11:19 AM    Bilirubin NEGATIVE  01/07/2015 11:19 AM    Blood NEGATIVE  01/07/2015 11:19 AM    Urobilinogen 1.0 01/07/2015 11:19 AM    Nitrites NEGATIVE  01/07/2015 11:19 AM    Leukocyte Esterase MODERATE (A) 01/07/2015 11:19 AM        Allergies   Allergen Reactions    Codeine Other (comments)     lightheadedness    Tramadol Unknown (comments)     HEADACHE, ONLY IF TAKEN AT NIGHT      Immunization History   Administered Date(s) Administered    Influenza Vaccine 10/20/2014, 10/06/2017    Influenza Vaccine (Quad) 02/09/2016    Pneumococcal Vaccine (Unspecified Type) 11/13/2012    TB Skin Test (PPD) Intradermal 01/07/2015       All Labs from Last 24 Hrs:  Recent Results (from the past 24 hour(s))   GLUCOSE, POC    Collection Time: 07/10/18 12:08 PM   Result Value Ref Range    Glucose (POC) 177 (H) 65 - 100 mg/dL   GLUCOSE, POC    Collection Time: 07/10/18  4:24 PM   Result Value Ref Range    Glucose (POC) 324 (H) 65 - 100 mg/dL   GLUCOSE, POC    Collection Time: 07/10/18  4:38 PM   Result Value Ref Range    Glucose (POC) 304 (H) 65 - 100 mg/dL   GLUCOSE, POC    Collection Time: 07/10/18  9:15 PM   Result Value Ref Range    Glucose (POC) 277 (H) 65 - 100 mg/dL   GLUCOSE, POC    Collection Time: 07/11/18  7:36 AM   Result Value Ref Range    Glucose (POC) 99 65 - 100 mg/dL       Discharge Exam:  Patient Vitals for the past 24 hrs:   Temp Pulse Resp BP SpO2   07/11/18 0710 98.3 °F (36.8 °C) 89 18 103/65 98 %   07/11/18 0400 98.8 °F (37.1 °C) 91 18 135/69 94 %   07/11/18 0000 98.7 °F (37.1 °C) 97 18 145/74 98 %   07/10/18 2004 99.8 °F (37.7 °C) 94 20 145/80 95 %   07/10/18 1600 98.9 °F (37.2 °C) 99 18 135/77 97 %   07/10/18 1200 99.2 °F (37.3 °C) 94 19 149/76 92 %     Oxygen Therapy  O2 Sat (%): 98 % (07/11/18 0710)  Pulse via Oximetry: 86 beats per minute (07/10/18 0529)  O2 Device: Room air (07/10/18 0529)  No intake or output data in the 24 hours ending 07/11/18 0925    General:    Well nourished. Alert. No distress. Eyes:   Normal sclera. Extraocular movements intact. ENT:  Normocephalic, atraumatic. Moist mucous membranes  CV:   Regular rate and rhythm. No murmur, rub, or gallop. Lungs:  Clear to auscultation bilaterally. No wheezing, rhonchi, or rales. Abdomen: Soft, nontender, nondistended. Bowel sounds normal.   Extremities: Warm and dry. No cyanosis or edema. Neurologic: CN II-XII grossly intact. Sensation intact. Skin:     No rashes or jaundice. Psych:  Normal mood and affect. Discharge Info:   Current Discharge Medication List      START taking these medications    Details   aspirin delayed-release 81 mg tablet Take 1 Tab by mouth daily. Qty: 30 Tab, Refills: 0      cephALEXin (KEFLEX) 500 mg capsule Take 1 Cap by mouth two (2) times a day for 3 days. Qty: 6 Cap, Refills: 0      Saccharomyces boulardii (FLORASTOR) 250 mg capsule Take 1 Cap by mouth two (2) times a day for 7 days. Qty: 14 Cap, Refills: 0         CONTINUE these medications which have NOT CHANGED    Details   vit a,c & e-lutein-minerals (VISION FORMULA, WITH LUTEIN,) tablet Take 1 Tab by mouth daily. magnesium 250 mg tab Take 250 mg by mouth daily. Alpha Lipoic Acid 600 mg cap Take 200 mg by mouth daily.       gabapentin (NEURONTIN) 600 mg tablet TAKE ONE TABLET BY MOUTH THREE TIMES DAILY  Qty: 270 Tab, Refills: 3      HUMALOG KWIKPEN 100 unit/mL kwikpen INJECT 40  UNITS SUBCUTANEOUSLY AT BEDTIME  Qty: 15 Pen, Refills: 11    Comments: Please consider 90 day supplies to promote better adherence      ezetimibe (ZETIA) 10 mg tablet Take 1 Tab by mouth daily. Qty: 90 Tab, Refills: 3      cyclobenzaprine (FLEXERIL) 10 mg tablet Take 1 Tab by mouth daily. Qty: 90 Tab, Refills: 3      traMADol (ULTRAM) 50 mg tablet TAKE ONE TABLET BY MOUTH TWICE DAILY AS NEEDED FOR 30 DAYS  Qty: 60 Tab, Refills: 5    Associated Diagnoses: Pain      glyBURIDE-metFORMIN (GLUCOVANCE) 5-500 mg per tablet Take 1 Tab by mouth Daily (before breakfast). Indications: type 2 diabetes mellitus  Qty: 90 Tab, Refills: 3      atorvastatin (LIPITOR) 80 mg tablet Take 1 Tab by mouth daily. Indications: hyperlipidemia  Qty: 90 Tab, Refills: 3    Associated Diagnoses: Pure hypercholesterolemia      levothyroxine (SYNTHROID) 100 mcg tablet TAKE ONE TABLET BY MOUTH ONCE DAILY BEFORE BREAKFAST FOR HYPOTHYROIDISM  Qty: 90 Tab, Refills: 3      MULTIVITAMIN PO Take  by mouth.      solifenacin (VESICARE) 10 mg tablet Take 10 mg by mouth daily. !! ONETOUCH ULTRA BLUE TEST STRIP strip USE TO TEST BLOOD SUGARS THREE TIMES A DAY  Qty: 300 Strip, Refills: 11      TURMERIC, BULK, by Does Not Apply route. BD INSULIN PEN NEEDLE UF SHORT 31 gauge x 5/16\" ndle USE  TWICE DAILY AS DIRECTED WITH  INSULIN  PENS  Qty: 100 Pen Needle, Refills: 17    Comments: Please consider 90 day supplies to promote better adherence      TOUJEO SOLOSTAR 300 unit/mL (1.5 mL) inpn INJECT 70  UNITS SUBCUTANEOUSLY ONCE DAILY WITH DINNER  Qty: 18 Pen, Refills: 11    Comments: Please consider 90 day supplies to promote better adherence      oxybutynin chloride XL (DITROPAN XL) 10 mg CR tablet Take 1 Tab by mouth daily. Qty: 30 Tab, Refills: 11      meloxicam (MOBIC) 15 mg tablet Take 1 Tab by mouth daily. Qty: 30 Tab, Refills: 11      cyanocobalamin 1,000 mcg tablet Take 1,000 mcg by mouth daily.       !! ONETOUCH ULTRA TEST strip TEST BLOOD SUGAR TWICE A   DAY  Qty: 200 Strip, Refills: 2      CALCIUM CARB/VIT D3/MINERALS (CALCIUM-VITAMIN D PO) Take  by mouth. !! - Potential duplicate medications found. Please discuss with provider. STOP taking these medications       naproxen (NAPROSYN) 500 mg tablet Comments:   Reason for Stopping:                 Disposition: home health services   Activity: as tolerated  Diet: DIET DIABETIC WITH OPTIONS Consistent Carb 2000kcal; Regular    Follow-up Appointments   Procedures    FOLLOW UP VISIT Appointment in: One Week     Standing Status:   Standing     Number of Occurrences:   1     Order Specific Question:   Appointment in     Answer: One Week         Follow-up Information     Follow up With Details Comments [address] [facility]  They will contact you within 48 hours of discharge to schedule initial visit. [address] [place] [address] Tee Dickson MD In 1 week  [address] [facility]  [phone]            Time spent in patient discharge planning and coordination 35 minutes.     Signed:  Sarina Delgado MD

## 2018-07-12 ENCOUNTER — PATIENT OUTREACH (OUTPATIENT)
Dept: CASE MANAGEMENT | Age: 68
End: 2018-07-12

## 2018-07-12 NOTE — PROGRESS NOTES
[facility] : Mark Gillespie. Rajani Da Silva RN    Transition of Care Discharge Follow-up Questionnaire   Date/Time of Call:  Patient name:  DAMON:  N:   18 @ [facility]  [birth year]  [id number]    What was the patient hospitalized for? CVA   Does the patient understand his/her diagnosis and/or treatment and what happened during the hospitalization? Yes- 18 Returned call to pt and states is doing okay and states able to ambulate with walker. Pt reports no residual from CVA but still has some weakness on the left side. Pt educated on the importance of pcp follow up 3-7 days post dc and states will be seen 18. Offered to assist with moving up appt and states needs to keep appt on that date. Pt also advised of the importance of medication compliance and reporting if has issues or side effects with medications asap. Pt also educated on the S&S of CVA and importance of calling 911 if occurs. Pt advised of the local [facility] urgent care and [facility] [employer] [facility] nurse advice line. Pt states home health was ordered but has not been contacted and offered assistance with 3 way call and states can call on her own and provided contact number. Pt advised if questions or concerns to contact care . Pt verbalizes an understanding. Did the patient receive discharge instructions? Yes   Review any discharge instructions (see notes in [facility]). Ask patient if they understand these. Do they have any questions? No questions. Were home services ordered (nursing, PT, OT, ST, etc.)? Yes   If so, has the first visit occurred? If not, why? (Assist with coordination of services if necessary.) No-offered assistance with3 way call and pt declined but provided contact number and encouraged to call today. Pt verbalizes an understanding. Was any DME ordered? No     If so, has it been received?   If not, why?  (Assist with coordination of arranging DME orders if necessary.) Na     Complete a review of all medications (new, continued and discontinued meds per the D/C instructions and medication tab in [facility]). Yes     Were all new prescriptions filled? If not, why?  (Assist with obtainment of medications if necessary.) Yes and states is tolerating medications well with no issues but advised if any side effects or issues should contact md asap. Pt verbalizes an understanding. Does the patient understand the purpose and dosing instructions for all medications? (If patient has questions, provide explanation and education.) Yes   Does the patient have any problems in performing ADLs? (If patient is unable to perform ADLs - what is the limiting factor(s)? Do they have a support system that can assist? If no support system is present, discuss possible assistance that they may be able to obtain.) Pt states is able to bath, prepare meals and function with little to no assistance but has  [employer] who can assist as needed. Does the patient have all follow-up appointments scheduled? 7 day f/up with PCP?    7-14 day f/up with specialist?    If f/up has not been made - what actions has the care coordinator made to accomplish this? Has transportation been arranged? [facility] follow-up should be within 7 days of discharge; all others should have PCP follow-up within 7 days of discharge; follow-ups with other specialists should be within 7-14 days of discharge.) PCP-7/19/18                      No-pt states  can transport to [facility] [place]. Any other questions or concerns expressed by the patient? Advised of the POC and care  role and contact information provided. Pt verbalizes an understanding. Schedule next appointment with RICKI Marroquin or refer to RN Case Manager/  per the workflow guidelines. When is care coordinators next follow-up call scheduled? If referred for CCM - what RN care manager was the referral assigned?  Week of July 16, 2018. CHARITY Call Completed By:   Kevon Baxter.  Stephan Barros RN

## 2018-07-13 NOTE — PROGRESS NOTES
Downtime progress note entry for [facility] Care : Gilda Dhillon RN    Care  Follow up Outreach Note   Outreach type: Follow up  Patient name: Gabriella Shelton  : [birth year]  MRN: [id number]    Date/Time of Outreach: 18     Reason for follow-up:   Continuation of care   Disease specific complaints/issues:   Cerebral infarction      Patient progress towards goals set from last contact:   pt verbalized that she is a little weak on the left side. Able to ambulate without assist. nurse encourage to dangle feet at side of bed prior to ambulation to prevent falls. Lives with  and Two children, who is very involved in pts care. pt verbalized that she doesn't drive as yet and family continues to assist with transport. nurse educate on the importance of keeping all Dr's appointment. no difficulty swallowing. nurse encourage to perform ROM exercise to increase strength. nurse educate on s/sx of CVA and encourage to call 911 immediately. Has patient attended any PCP or specialist follow-up appointments since last contact? What was outcome of appointment? When is next follow-up scheduled? . pt was supposed to be seeing PCP on the , but because her  appointment was on the , she moved her appointment back. nurse enquire as to whether 's appointment could be moved forward, pt verbalized no. Review medications. Any medication changes since last outreach? Does patient have any questions or issues related to their medications? currently taking aspirin. nurse encourage timely administration and encourage to assess for s/sx of bleeding. Home health active? If yes - any issue? Progress? HHN starting to come out on the week of the . Referrals needed?  (SW, Diabetes education, HH, etc. ) None needed at this time   Other issues/Miscellaneous?  (Transportation, access to meals, ability to perform ADLs, adequate caregiver support, etc.)     nurse will continue to monitor and provide care as needed.          Next Outreach Scheduled: Week of the 16th     Next Steps/Goals:      Care  completing call: Marcelino Zhang

## 2018-07-16 ENCOUNTER — HOME HEALTH ADMISSION (OUTPATIENT)
Dept: HOME HEALTH SERVICES | Facility: HOME HEALTH | Age: 68
End: 2018-07-16
Payer: MEDICARE

## 2018-07-17 ENCOUNTER — HOME CARE VISIT (OUTPATIENT)
Dept: SCHEDULING | Facility: HOME HEALTH | Age: 68
End: 2018-07-17
Payer: MEDICARE

## 2018-07-17 VITALS
TEMPERATURE: 98.4 F | OXYGEN SATURATION: 98 % | SYSTOLIC BLOOD PRESSURE: 120 MMHG | RESPIRATION RATE: 16 BRPM | DIASTOLIC BLOOD PRESSURE: 68 MMHG | HEART RATE: 80 BPM

## 2018-07-17 PROCEDURE — 400013 HH SOC

## 2018-07-17 PROCEDURE — 3331090002 HH PPS REVENUE DEBIT

## 2018-07-17 PROCEDURE — G0151 HHCP-SERV OF PT,EA 15 MIN: HCPCS

## 2018-07-17 PROCEDURE — 3331090001 HH PPS REVENUE CREDIT

## 2018-07-18 ENCOUNTER — HOME CARE VISIT (OUTPATIENT)
Dept: SCHEDULING | Facility: HOME HEALTH | Age: 68
End: 2018-07-18
Payer: MEDICARE

## 2018-07-18 VITALS
HEART RATE: 78 BPM | RESPIRATION RATE: 16 BRPM | DIASTOLIC BLOOD PRESSURE: 68 MMHG | TEMPERATURE: 98.5 F | SYSTOLIC BLOOD PRESSURE: 122 MMHG

## 2018-07-18 PROCEDURE — G0153 HHCP-SVS OF S/L PATH,EA 15MN: HCPCS

## 2018-07-18 PROCEDURE — 3331090002 HH PPS REVENUE DEBIT

## 2018-07-18 PROCEDURE — 3331090001 HH PPS REVENUE CREDIT

## 2018-07-19 ENCOUNTER — HOME CARE VISIT (OUTPATIENT)
Dept: SCHEDULING | Facility: HOME HEALTH | Age: 68
End: 2018-07-19
Payer: MEDICARE

## 2018-07-19 VITALS
TEMPERATURE: 98.4 F | RESPIRATION RATE: 14 BRPM | HEART RATE: 68 BPM | DIASTOLIC BLOOD PRESSURE: 60 MMHG | SYSTOLIC BLOOD PRESSURE: 110 MMHG

## 2018-07-19 PROBLEM — D64.9 ANEMIA: Status: ACTIVE | Noted: 2018-07-19

## 2018-07-19 PROCEDURE — 3331090001 HH PPS REVENUE CREDIT

## 2018-07-19 PROCEDURE — 3331090002 HH PPS REVENUE DEBIT

## 2018-07-19 PROCEDURE — G0153 HHCP-SVS OF S/L PATH,EA 15MN: HCPCS

## 2018-07-20 ENCOUNTER — HOME CARE VISIT (OUTPATIENT)
Dept: SCHEDULING | Facility: HOME HEALTH | Age: 68
End: 2018-07-20
Payer: MEDICARE

## 2018-07-20 VITALS
DIASTOLIC BLOOD PRESSURE: 65 MMHG | SYSTOLIC BLOOD PRESSURE: 114 MMHG | HEART RATE: 73 BPM | TEMPERATURE: 97.8 F | OXYGEN SATURATION: 96 %

## 2018-07-20 PROCEDURE — G0152 HHCP-SERV OF OT,EA 15 MIN: HCPCS

## 2018-07-20 PROCEDURE — 3331090001 HH PPS REVENUE CREDIT

## 2018-07-20 PROCEDURE — 3331090002 HH PPS REVENUE DEBIT

## 2018-07-21 ENCOUNTER — HOME CARE VISIT (OUTPATIENT)
Dept: SCHEDULING | Facility: HOME HEALTH | Age: 68
End: 2018-07-21
Payer: MEDICARE

## 2018-07-21 PROCEDURE — 3331090002 HH PPS REVENUE DEBIT

## 2018-07-21 PROCEDURE — 3331090001 HH PPS REVENUE CREDIT

## 2018-07-21 PROCEDURE — G0157 HHC PT ASSISTANT EA 15: HCPCS

## 2018-07-22 PROCEDURE — 3331090002 HH PPS REVENUE DEBIT

## 2018-07-22 PROCEDURE — 3331090001 HH PPS REVENUE CREDIT

## 2018-07-23 ENCOUNTER — HOME CARE VISIT (OUTPATIENT)
Dept: SCHEDULING | Facility: HOME HEALTH | Age: 68
End: 2018-07-23
Payer: MEDICARE

## 2018-07-23 VITALS
SYSTOLIC BLOOD PRESSURE: 116 MMHG | HEART RATE: 78 BPM | DIASTOLIC BLOOD PRESSURE: 68 MMHG | TEMPERATURE: 98.5 F | RESPIRATION RATE: 16 BRPM

## 2018-07-23 PROCEDURE — 3331090002 HH PPS REVENUE DEBIT

## 2018-07-23 PROCEDURE — 3331090001 HH PPS REVENUE CREDIT

## 2018-07-23 PROCEDURE — G0153 HHCP-SVS OF S/L PATH,EA 15MN: HCPCS

## 2018-07-24 ENCOUNTER — PATIENT OUTREACH (OUTPATIENT)
Dept: CASE MANAGEMENT | Age: 68
End: 2018-07-24

## 2018-07-24 ENCOUNTER — HOME CARE VISIT (OUTPATIENT)
Dept: SCHEDULING | Facility: HOME HEALTH | Age: 68
End: 2018-07-24
Payer: MEDICARE

## 2018-07-24 VITALS
HEART RATE: 80 BPM | SYSTOLIC BLOOD PRESSURE: 115 MMHG | RESPIRATION RATE: 17 BRPM | TEMPERATURE: 96.9 F | DIASTOLIC BLOOD PRESSURE: 52 MMHG

## 2018-07-24 VITALS
RESPIRATION RATE: 20 BRPM | DIASTOLIC BLOOD PRESSURE: 76 MMHG | SYSTOLIC BLOOD PRESSURE: 129 MMHG | HEART RATE: 82 BPM | TEMPERATURE: 97.8 F

## 2018-07-24 PROCEDURE — G0157 HHC PT ASSISTANT EA 15: HCPCS

## 2018-07-24 PROCEDURE — G0152 HHCP-SERV OF OT,EA 15 MIN: HCPCS

## 2018-07-24 PROCEDURE — 3331090001 HH PPS REVENUE CREDIT

## 2018-07-24 PROCEDURE — 3331090002 HH PPS REVENUE DEBIT

## 2018-07-25 PROCEDURE — 3331090001 HH PPS REVENUE CREDIT

## 2018-07-25 PROCEDURE — 3331090002 HH PPS REVENUE DEBIT

## 2018-07-25 NOTE — PROGRESS NOTES
Downtime Progress Note for [facility] : María Elena Humphrey RN    Care  Follow up Outreach Note   Outreach type:  Patient name:  :  MRN: Care  call. Julia Griffith  [birth year]  [id number]   Date/Time of Outreach: 18 @ [phone]     Reason for follow-up:   Weekly care  call   Disease specific complaints/issues: CVA       Patient progress towards goals set from last contact:   18 Called pt and states is doing well and states left side weakness has improved and states will be dc'd from home health soon. Pt states seen by pcp with no changes to medications and will see again in 3 months. Pt also states that speech therapy and OT has ended and does not need to use walker. Pt advised of the importance of calling 911 with S&S of CVA and medication compliance. Pt verbalizes an understanding. Has patient attended any PCP or specialist follow-up appointments since last contact? What was outcome of appointment? When is next follow-up scheduled? PCP-no changes per pt and return in 3 months. Review medications. Any medication changes since last outreach? Does patient have any questions or issues related to their medications? No  Na   Home health active? If yes - any issue? Progress? Yes still active but per pt will end soon. Referrals needed?  (SW, Diabetes education, HH, etc. ) Na   Other issues/Miscellaneous? (Transportation, access to meals, ability to perform ADLs, adequate caregiver support, etc.) Na-pt denies need for assistance with ADLs and states family can transport to appts as needed. Next Outreach Scheduled: Week of 2018. Next Steps/Goals:   Pt will call 911 with S&S of CVA. Pt will be compliant with medications. Pt will report side effects or issues with medications asap to md.   Care  completing call: Roman Amin.  Hugo Flores RN

## 2018-07-26 ENCOUNTER — HOME CARE VISIT (OUTPATIENT)
Dept: SCHEDULING | Facility: HOME HEALTH | Age: 68
End: 2018-07-26
Payer: MEDICARE

## 2018-07-26 VITALS — DIASTOLIC BLOOD PRESSURE: 61 MMHG | SYSTOLIC BLOOD PRESSURE: 118 MMHG | TEMPERATURE: 97.8 F | HEART RATE: 85 BPM

## 2018-07-26 VITALS
TEMPERATURE: 98.2 F | RESPIRATION RATE: 19 BRPM | HEART RATE: 82 BPM | DIASTOLIC BLOOD PRESSURE: 83 MMHG | SYSTOLIC BLOOD PRESSURE: 139 MMHG

## 2018-07-26 PROCEDURE — G0152 HHCP-SERV OF OT,EA 15 MIN: HCPCS

## 2018-07-26 PROCEDURE — 3331090002 HH PPS REVENUE DEBIT

## 2018-07-26 PROCEDURE — 3331090001 HH PPS REVENUE CREDIT

## 2018-07-27 ENCOUNTER — HOME CARE VISIT (OUTPATIENT)
Dept: SCHEDULING | Facility: HOME HEALTH | Age: 68
End: 2018-07-27
Payer: MEDICARE

## 2018-07-27 PROCEDURE — 3331090001 HH PPS REVENUE CREDIT

## 2018-07-27 PROCEDURE — 3331090002 HH PPS REVENUE DEBIT

## 2018-07-27 PROCEDURE — G0157 HHC PT ASSISTANT EA 15: HCPCS

## 2018-07-28 PROCEDURE — 3331090001 HH PPS REVENUE CREDIT

## 2018-07-28 PROCEDURE — 3331090002 HH PPS REVENUE DEBIT

## 2018-07-29 PROCEDURE — 3331090002 HH PPS REVENUE DEBIT

## 2018-07-29 PROCEDURE — 3331090001 HH PPS REVENUE CREDIT

## 2018-07-30 PROCEDURE — 3331090002 HH PPS REVENUE DEBIT

## 2018-07-30 PROCEDURE — 3331090001 HH PPS REVENUE CREDIT

## 2018-07-31 ENCOUNTER — PATIENT OUTREACH (OUTPATIENT)
Dept: CASE MANAGEMENT | Age: 68
End: 2018-07-31

## 2018-07-31 ENCOUNTER — HOME CARE VISIT (OUTPATIENT)
Dept: SCHEDULING | Facility: HOME HEALTH | Age: 68
End: 2018-07-31
Payer: MEDICARE

## 2018-07-31 PROCEDURE — 3331090002 HH PPS REVENUE DEBIT

## 2018-07-31 PROCEDURE — G0152 HHCP-SERV OF OT,EA 15 MIN: HCPCS

## 2018-07-31 PROCEDURE — G0157 HHC PT ASSISTANT EA 15: HCPCS

## 2018-07-31 PROCEDURE — 3331090001 HH PPS REVENUE CREDIT

## 2018-07-31 NOTE — PROGRESS NOTES
Downtime Progress Note for Transcend : Rafaela Corrales RN    Care  Follow up Outreach Note   Outreach type:  Patient name:  :  MRN: Care  call. Britany Yang  [birth year]  [id number]   Date/Time of Outreach: 18 @ [phone]     Reason for follow-up:   Weekly care  follow up. Disease specific complaints/issues: CVA       Patient progress towards goals set from last contact:   18 Called pt and family states pt is not available and will have her call care  back. Has patient attended any PCP or specialist follow-up appointments since last contact? What was outcome of appointment? When is next follow-up scheduled? Review medications. Any medication changes since last outreach? Does patient have any questions or issues related to their medications? Home health active? If yes - any issue? Progress? Referrals needed?  (SW, Diabetes education, HH, etc. )    Other issues/Miscellaneous? (Transportation, access to meals, ability to perform ADLs, adequate caregiver support, etc.)              Next Outreach Scheduled:      Next Steps/Goals:      Care  completing call: Alfredo Casillas.  Gina Davidson RN

## 2018-08-01 ENCOUNTER — HOME CARE VISIT (OUTPATIENT)
Dept: SCHEDULING | Facility: HOME HEALTH | Age: 68
End: 2018-08-01
Payer: MEDICARE

## 2018-08-01 VITALS
RESPIRATION RATE: 18 BRPM | TEMPERATURE: 97.2 F | DIASTOLIC BLOOD PRESSURE: 78 MMHG | SYSTOLIC BLOOD PRESSURE: 132 MMHG | HEART RATE: 82 BPM

## 2018-08-01 PROCEDURE — G0157 HHC PT ASSISTANT EA 15: HCPCS

## 2018-08-01 PROCEDURE — 3331090001 HH PPS REVENUE CREDIT

## 2018-08-01 PROCEDURE — 3331090002 HH PPS REVENUE DEBIT

## 2018-08-02 ENCOUNTER — PATIENT OUTREACH (OUTPATIENT)
Dept: CASE MANAGEMENT | Age: 68
End: 2018-08-02

## 2018-08-02 ENCOUNTER — HOME CARE VISIT (OUTPATIENT)
Dept: SCHEDULING | Facility: HOME HEALTH | Age: 68
End: 2018-08-02
Payer: MEDICARE

## 2018-08-02 VITALS — HEART RATE: 74 BPM | SYSTOLIC BLOOD PRESSURE: 130 MMHG | DIASTOLIC BLOOD PRESSURE: 64 MMHG | TEMPERATURE: 97.7 F

## 2018-08-02 VITALS
DIASTOLIC BLOOD PRESSURE: 83 MMHG | SYSTOLIC BLOOD PRESSURE: 138 MMHG | HEART RATE: 82 BPM | RESPIRATION RATE: 18 BRPM | TEMPERATURE: 97.2 F

## 2018-08-02 VITALS
DIASTOLIC BLOOD PRESSURE: 78 MMHG | HEART RATE: 82 BPM | RESPIRATION RATE: 18 BRPM | SYSTOLIC BLOOD PRESSURE: 132 MMHG | TEMPERATURE: 97.2 F

## 2018-08-02 PROCEDURE — 3331090002 HH PPS REVENUE DEBIT

## 2018-08-02 PROCEDURE — G0152 HHCP-SERV OF OT,EA 15 MIN: HCPCS

## 2018-08-02 PROCEDURE — 3331090001 HH PPS REVENUE CREDIT

## 2018-08-03 PROCEDURE — 3331090001 HH PPS REVENUE CREDIT

## 2018-08-03 PROCEDURE — 3331090002 HH PPS REVENUE DEBIT

## 2018-08-04 PROCEDURE — 3331090001 HH PPS REVENUE CREDIT

## 2018-08-04 PROCEDURE — 3331090002 HH PPS REVENUE DEBIT

## 2018-08-05 PROCEDURE — 3331090001 HH PPS REVENUE CREDIT

## 2018-08-05 PROCEDURE — 3331090002 HH PPS REVENUE DEBIT

## 2018-08-06 PROCEDURE — 3331090002 HH PPS REVENUE DEBIT

## 2018-08-06 PROCEDURE — 3331090001 HH PPS REVENUE CREDIT

## 2018-08-07 ENCOUNTER — PATIENT OUTREACH (OUTPATIENT)
Dept: CASE MANAGEMENT | Age: 68
End: 2018-08-07

## 2018-08-07 ENCOUNTER — HOME CARE VISIT (OUTPATIENT)
Dept: SCHEDULING | Facility: HOME HEALTH | Age: 68
End: 2018-08-07
Payer: MEDICARE

## 2018-08-07 VITALS
DIASTOLIC BLOOD PRESSURE: 78 MMHG | RESPIRATION RATE: 18 BRPM | HEART RATE: 98 BPM | SYSTOLIC BLOOD PRESSURE: 124 MMHG | TEMPERATURE: 98.2 F

## 2018-08-07 PROCEDURE — 3331090001 HH PPS REVENUE CREDIT

## 2018-08-07 PROCEDURE — 3331090003 HH PPS REVENUE ADJ

## 2018-08-07 PROCEDURE — 3331090002 HH PPS REVENUE DEBIT

## 2018-08-07 PROCEDURE — G0151 HHCP-SERV OF PT,EA 15 MIN: HCPCS

## 2018-08-07 NOTE — PROGRESS NOTES
Downtime Progress Note for Transcend : [facility]  Follow up Outreach Note   Outreach type: Follow up  Patient name: Tiffany Park  : [birth year]  MRN: [id number]    Date/Time of Outreach: 18     Reason for follow-up:   Continuation of care   Disease specific complaints/issues: Cerebral infarction       Patient progress towards goals set from last contact:   18 Spoke with pt nurse. encourage to monitor and keep her stress levels low. Suggest nature sounds (waves crashing, rain falling) to help her rest better at night. encourage to turn off all phone/computers, and television at least 2 hrs before bed time, understanding verbalized. healthy appetite verbalized   Has patient attended any PCP or specialist follow-up appointments since last contact? What was outcome of appointment? When is next follow-up scheduled? pt Saw PCP on the  and will continue to see every 3-4 months. Review medications. Any medication changes since last outreach? Does patient have any questions or issues related to their medications? meds taken timely   Home health active? If yes - any issue? Progress? verbalized that PT was there today and discharged her from care. Referrals needed?  (SW, Diabetes education, HH, etc. )    Other issues/Miscellaneous? (Transportation, access to meals, ability to perform ADLs, adequate caregiver support, etc.)     .  continues to be involved in pts care. pt verbalized that she do everything for herself. continue to drive to all Dr's appointment. nurse encourage no to over work herself. no needs or concerns at this time. nurse will continue to monitor and provide care as needed.          Next Outreach Scheduled: 18     Next Steps/Goals:      Care  completing call: Marcelino Zhang

## 2018-08-08 ENCOUNTER — PATIENT OUTREACH (OUTPATIENT)
Dept: CASE MANAGEMENT | Age: 68
End: 2018-08-08

## 2018-08-09 NOTE — PROGRESS NOTES
Downtime Progress Note for Transcend : [facility]  Follow up Outreach Note   Outreach type: Follow up  Patient name: Trista Regan  : 1950  MRN: [id number]    Date/Time of Outreach: 18     Reason for follow-up:   Continuation of care   Disease specific complaints/issues: Cerebral infarction       Patient progress towards goals set from last contact:     Spoke with pt. verbalized that she is doing well. living with  and children. Pt is currently discharged from OT, ST, and PT will be coming on the 13th to discharge pt. pt is well informed on S/sx of CVA. Nurse educate on the importance of keeping all appointments. encourage to go to ER for life threatening circumstances only. will call PCP with needs and concerns. verbalized that she is aware of urgent care clinics and has made visits to urgent care clinics before. nurse encourage to utilize urgent care clinics for non-life threatening circumstance. Has patient attended any PCP or specialist follow-up appointments since last contact? What was outcome of appointment? When is next follow-up scheduled? Review medications. Any medication changes since last outreach? Does patient have any questions or issues related to their medications? meds taken timely and no issues or concerns   Home health active? If yes - any issue? Progress? Referrals needed?  (SW, Diabetes education, HH, etc. ) no referrals needed. Other issues/Miscellaneous? (Transportation, access to meals, ability to perform ADLs, adequate caregiver support, etc.)     pt drives to all appointments.  assist at time. nurse will continue to monitor and provide care as needed.      Next Outreach Scheduled: Week of the 6th     Next Steps/Goals:      Care  completing call: Gentry Chapman

## 2018-08-09 NOTE — PROGRESS NOTES
Downtime Progress Note for Transcend : Glenda Torres RN    Care  Follow up Outreach Note   Outreach type:  Patient name:  :  MRN: Care  callMarquita Crowder  [birth year]  [id number]   Date/Time of Outreach: 18 @ 1005     Reason for follow-up:   Weekly care  follow up. Disease specific complaints/issues: CVA       Patient progress towards goals set from last contact:   2018 Called pt and family states pt is not available but will have call back later. Has patient attended any PCP or specialist follow-up appointments since last contact? What was outcome of appointment? When is next follow-up scheduled? Review medications. Any medication changes since last outreach? Does patient have any questions or issues related to their medications? Home health active? If yes - any issue? Progress? Referrals needed?  (SW, Diabetes education, HH, etc. )    Other issues/Miscellaneous? (Transportation, access to meals, ability to perform ADLs, adequate caregiver support, etc.)              Next Outreach Scheduled:      Next Steps/Goals:      Care  completing call: Chacorta Crane.  James Jenkins RN

## 2018-08-14 ENCOUNTER — PATIENT OUTREACH (OUTPATIENT)
Dept: CASE MANAGEMENT | Age: 68
End: 2018-08-14

## 2018-08-15 ENCOUNTER — PATIENT OUTREACH (OUTPATIENT)
Dept: CASE MANAGEMENT | Age: 68
End: 2018-08-15

## 2018-08-15 NOTE — PROGRESS NOTES
Downtime Progress Note for Transcend : [facility]  Follow up Outreach Note   Outreach type: Follow up  Patient name: Rhonda Alvarenga  : 1950  MRN: [id number]    Date/Time of Outreach: 8/15/18     Reason for follow-up:   Continuation of care   Disease specific complaints/issues: Cerebral infarction       Patient progress towards goals set from last contact:   8/15/18 Reached out to pts # [phone], LVM. reached out to [phone] and spoke with pts . verbalized that he is not home and request for nurse to call another time. nurse will continue to monitor and provide care as needed. Has patient attended any PCP or specialist follow-up appointments since last contact? What was outcome of appointment? When is next follow-up scheduled? Review medications. Any medication changes since last outreach? Does patient have any questions or issues related to their medications? Home health active? If yes - any issue? Progress? Referrals needed?  (SW, Diabetes education, HH, etc. )    Other issues/Miscellaneous?  (Transportation, access to meals, ability to perform ADLs, adequate caregiver support, etc.)              Next Outreach Scheduled: Week of the      Next Steps/Goals:      Care  completing call: Charlyne Leventhal

## 2018-08-15 NOTE — PROGRESS NOTES
Downtime Progress Note for Transcend : Marguerite Maldonado RN    Care  Follow up Outreach Note   Outreach type:  Patient name:  :  MRN: Care  callMarquita Park  [birth year]  [id number]   Date/Time of Outreach: 18 @ [phone]     Reason for follow-up:   Weekly care  follow up. Disease specific complaints/issues: CVA       Patient progress towards goals set from last contact:   18 Called pt at [phone] and also [phone] and unable to reach but left message to call back. Has patient attended any PCP or specialist follow-up appointments since last contact? What was outcome of appointment? When is next follow-up scheduled? Review medications. Any medication changes since last outreach? Does patient have any questions or issues related to their medications? Home health active? If yes - any issue? Progress? Referrals needed?  (SW, Diabetes education, HH, etc. )    Other issues/Miscellaneous? (Transportation, access to meals, ability to perform ADLs, adequate caregiver support, etc.)              Next Outreach Scheduled: None-graduated and followed since 18     Next Steps/Goals:   NA   Care  completing call: Aidan Obrien.  Carlos Davis RN

## 2018-08-21 ENCOUNTER — PATIENT OUTREACH (OUTPATIENT)
Dept: CASE MANAGEMENT | Age: 68
End: 2018-08-21

## 2018-08-23 NOTE — PROGRESS NOTES
Downtime progress note entry for [facility] Care : Freddy Chacon RN      Care  Follow up Outreach Note   Outreach type: Follow up  Patient name: Anay Hagen  : 1950  MRN: [id number]    Date/Time of Outreach: 18     Reason for follow-up:   Continuation of care   Disease specific complaints/issues: Cerebral infarction       Patient progress towards goals set from last contact:   18 LVM on [phone]. Reached out to [phone] and spoke with pt. verbalized that she continues to improve. Independent of all ADL's. Drives to all appointments. pt verbalized that she hasn't had a BM in 1 week. refuse to let PCP know because its normal for her. nurse educate on taking laxative as ordered. verbalized that she will be seeing PCP on . continues to take all meds as ordered. nurse will continue to monitor and provide care as needed. Has patient attended any PCP or specialist follow-up appointments since last contact? What was outcome of appointment? When is next follow-up scheduled? Review medications. Any medication changes since last outreach? Does patient have any questions or issues related to their medications? Home health active? If yes - any issue? Progress? Referrals needed?  (SW, Diabetes education, HH, etc. )    Other issues/Miscellaneous?  (Transportation, access to meals, ability to perform ADLs, adequate caregiver support, etc.)              Next Outreach Scheduled: Week of the      Next Steps/Goals:      Care  completing call: Freddy Chacon

## 2018-08-31 ENCOUNTER — HOSPITAL ENCOUNTER (INPATIENT)
Age: 68
LOS: 2 days | Discharge: HOME | DRG: 069 | End: 2018-09-03
Attending: EMERGENCY MEDICINE | Admitting: HOSPITALIST
Payer: MEDICARE

## 2018-08-31 ENCOUNTER — APPOINTMENT (OUTPATIENT)
Dept: CT IMAGING | Age: 68
DRG: 069 | End: 2018-08-31
Attending: EMERGENCY MEDICINE
Payer: MEDICARE

## 2018-08-31 DIAGNOSIS — I63.9 CEREBROVASCULAR ACCIDENT (CVA), UNSPECIFIED MECHANISM (HCC): Primary | ICD-10-CM

## 2018-08-31 LAB
BASOPHILS # BLD: 0 K/UL (ref 0–0.2)
BASOPHILS NFR BLD: 1 % (ref 0–2)
DIFFERENTIAL METHOD BLD: ABNORMAL
EOSINOPHIL # BLD: 0.3 K/UL (ref 0–0.8)
EOSINOPHIL NFR BLD: 5 % (ref 0.5–7.8)
ERYTHROCYTE [DISTWIDTH] IN BLOOD BY AUTOMATED COUNT: 17.1 %
GLUCOSE BLD STRIP.AUTO-MCNC: 270 MG/DL (ref 65–100)
HCT VFR BLD AUTO: 37.4 % (ref 35.8–46.3)
HGB BLD-MCNC: 11.9 G/DL (ref 11.7–15.4)
IMM GRANULOCYTES # BLD: 0 K/UL (ref 0–0.5)
IMM GRANULOCYTES NFR BLD AUTO: 0 % (ref 0–5)
LYMPHOCYTES # BLD: 1.5 K/UL (ref 0.5–4.6)
LYMPHOCYTES NFR BLD: 29 % (ref 13–44)
MCH RBC QN AUTO: 26.6 PG (ref 26.1–32.9)
MCHC RBC AUTO-ENTMCNC: 31.8 G/DL (ref 31.4–35)
MCV RBC AUTO: 83.5 FL (ref 79.6–97.8)
MONOCYTES # BLD: 0.6 K/UL (ref 0.1–1.3)
MONOCYTES NFR BLD: 11 % (ref 4–12)
NEUTS SEG # BLD: 2.8 K/UL (ref 1.7–8.2)
NEUTS SEG NFR BLD: 54 % (ref 43–78)
NRBC # BLD: 0 K/UL (ref 0–0.2)
PLATELET # BLD AUTO: 129 K/UL (ref 150–450)
PMV BLD AUTO: 11.7 FL (ref 9.4–12.3)
RBC # BLD AUTO: 4.48 M/UL (ref 4.05–5.2)
WBC # BLD AUTO: 5.2 K/UL (ref 4.3–11.1)

## 2018-08-31 PROCEDURE — 81003 URINALYSIS AUTO W/O SCOPE: CPT | Performed by: EMERGENCY MEDICINE

## 2018-08-31 PROCEDURE — 99285 EMERGENCY DEPT VISIT HI MDM: CPT | Performed by: EMERGENCY MEDICINE

## 2018-08-31 PROCEDURE — 93005 ELECTROCARDIOGRAM TRACING: CPT | Performed by: EMERGENCY MEDICINE

## 2018-08-31 PROCEDURE — 80053 COMPREHEN METABOLIC PANEL: CPT

## 2018-08-31 PROCEDURE — 85025 COMPLETE CBC W/AUTO DIFF WBC: CPT

## 2018-08-31 PROCEDURE — 70450 CT HEAD/BRAIN W/O DYE: CPT

## 2018-08-31 PROCEDURE — 82962 GLUCOSE BLOOD TEST: CPT

## 2018-08-31 RX ORDER — SODIUM CHLORIDE 0.9 % (FLUSH) 0.9 %
5-10 SYRINGE (ML) INJECTION AS NEEDED
Status: DISCONTINUED | OUTPATIENT
Start: 2018-08-31 | End: 2018-09-03 | Stop reason: HOSPADM

## 2018-08-31 RX ORDER — SODIUM CHLORIDE 0.9 % (FLUSH) 0.9 %
5-10 SYRINGE (ML) INJECTION EVERY 8 HOURS
Status: DISCONTINUED | OUTPATIENT
Start: 2018-08-31 | End: 2018-09-03 | Stop reason: HOSPADM

## 2018-08-31 NOTE — IP AVS SNAPSHOT
[address] 322 [facility] 
[phone] Patient: Amando Hutton MRN: [id number] REO:[id number] About your hospitalization You were admitted on:  September 1, 2018 You last received care in the:  [facility] 7 MED SURG You were discharged on:  September 3, 2018 Why you were hospitalized Your primary diagnosis was:  Tia (Transient Ischemic Attack) Your diagnoses also included:  Hypothyroidism, Essential Hypertension, Type 2 Diabetes Mellitus Without Complication, With Long-Term Current Use Of Insulin (Hcc), Gerd (Gastroesophageal Reflux Disease), Pure Hypercholesterolemia, Acute Uti (Urinary Tract Infection), Difficulty With Speech Follow-up Information Follow up With Details Comments Contact Info Magdalene Dent MD Schedule an appointment as soon as possible for a visit in 1 week Call tomorrow for a 1 week follow up appointment [address] [place] [address] [id number] [phone] Make appointment   for diabetic teaching Your Scheduled Appointments Tuesday October 23, 2018 10:00 AM EDT Office Visit with Magdalene Dent MD  
[address] ([address] [place] [address]) [address] [id number] [phone] Discharge Orders None A check monica indicates which time of day the medication should be taken. My Medications START taking these medications Instructions Each Dose to Equal  
 Morning Noon Evening Bedtime  
 clopidogrel 75 mg Tab Commonly known as:  PLAVIX Start taking on:  9/4/2018 Your last dose was: This morning 9/3 Your next dose is:  Tomorrow morning 9/4 Take 1 Tab by mouth daily. 75 mg  
    
  
   
   
   
  
 insulin lispro 100 unit/mL injection Commonly known as:  HUMALOG Your last dose was: This morning 9/3 Your next dose is:   This evening and bedtime 9/3  
   
 Sliding scale as above for blood sugar levels before meals and at bedtime. For Blood Sugar (mg/dL) of:  Less than 150 = 0 units  150 -199 = 2 units 200 -249 = 4 units 250 -299 = 6 units 300 -349 = 8 units  
     
  
   
  
   
  
   
  
  
 trimethoprim-sulfamethoxazole 160-800 mg per tablet Commonly known as:  BACTRIM DS, SEPTRA DS Your next dose is: This evening 9/3 Take 1 Tab by mouth two (2) times a day. 1 Tab CHANGE how you take these medications Instructions Each Dose to Equal  
 Morning Noon Evening Bedtime  
 insulin glargine 300 unit/mL (1.5 mL) Inpn Commonly known as:  TOUJEO SOLOSTAR U-300 INSULIN What changed:   
- how much to take - Another medication with the same name was removed. Continue taking this medication, and follow the directions you see here. Your next dose is:  Tomorrow morning 9/4  
   
 18 Units by SubCUTAneous route Daily (before breakfast). 18 Units  
    
  
   
   
   
  
 traMADol 50 mg tablet Commonly known as:  ULTRAM  
What changed:  additional instructions Your next dose is: Take today if needed TAKE ONE TABLET BY MOUTH TWICE DAILY AS NEEDED FOR 30 DAYS CONTINUE taking these medications Instructions Each Dose to Equal  
 Morning Noon Evening Bedtime Alpha Lipoic Acid 600 mg Cap Your next dose is:  Tomorrow morning 9/4 Take 200 mg by mouth daily. 200 mg  
    
  
   
   
   
  
 aspirin delayed-release 81 mg tablet Your next dose is:  Tomorrow morning 9/4 Take 1 Tab by mouth daily. 81 mg  
    
  
   
   
   
  
 atorvastatin 80 mg tablet Commonly known as:  LIPITOR Your last dose was: This morning 9/3 Your next dose is:  Tomorrow morning 9/4 Take 1 Tab by mouth daily. Indications: hyperlipidemia 80 mg  
    
  
   
   
   
  
 BD ULTRA-FINE SHORT PEN NEEDLE 31 gauge x 5/16\" Ndle Generic drug:  Insulin Needles (Disposable) USE  TWICE DAILY AS DIRECTED WITH  INSULIN  PENS  
     
   
   
   
  
 CALCIUM-VITAMIN D PO Your last dose was: This morning 9/3 Your next dose is:  Tomorrow morning 9/4 Take 1 Tab by mouth daily. 1 Tab  
    
  
   
   
   
  
 cyanocobalamin 1,000 mcg tablet Your last dose was: This morning 9/3 Your next dose is:  Tomorrow morning 9/4 Take 1,000 mcg by mouth daily. 1000 mcg  
    
  
   
   
   
  
 cyclobenzaprine 10 mg tablet Commonly known as:  FLEXERIL Your last dose was: This morning 9/3 Your next dose is:  Tomorrow morning 9/4 Take 1 Tab by mouth daily. 10 mg  
    
  
   
   
   
  
 ezetimibe 10 mg tablet Commonly known as:  Thomas Costain Your last dose was: This morning 9/3 Your next dose is:  Tomorrow morning 9/4 Take 1 Tab by mouth daily. 10 mg  
    
  
   
   
   
  
 gabapentin 600 mg tablet Commonly known as:  NEURONTIN Your last dose was: This morning 9/3 Your next dose is: This afternoon and evening 9/3 TAKE ONE TABLET BY MOUTH THREE TIMES DAILY  
     
  
   
  
   
  
   
  
 glyBURIDE-metFORMIN 5-500 mg per tablet Commonly known as:  Sabine Osvaldo Your next dose is:  Tomorrow morning 9/4 Take 1 Tab by mouth Daily (before breakfast). Indications: type 2 diabetes mellitus 1 Tab  
    
  
   
   
   
  
 levothyroxine 100 mcg tablet Commonly known as:  SYNTHROID Your last dose was: This morning 9/3 Your next dose is:  Tomorrow morning 9/4 TAKE ONE TABLET BY MOUTH ONCE DAILY BEFORE BREAKFAST FOR HYPOTHYROIDISM  
     
  
   
   
   
  
 magnesium 250 mg Tab Your last dose was: This morning 9/3 Your next dose is:  Tomorrow morning 9/4 Take 250 mg by mouth daily. 250 mg MULTIVITAMIN PO Your last dose was: This morning 9/3 Your next dose is:  Tomorrow morning 9/4 Take 1 Tab by mouth daily. 1 Tab * ONETOUCH ULTRA TEST strip Generic drug:  glucose blood VI test strips TEST BLOOD SUGAR TWICE A   DAY  
     
   
   
   
  
 * ONETOUCH ULTRA BLUE TEST STRIP strip Generic drug:  glucose blood VI test strips USE TO TEST BLOOD SUGARS THREE TIMES A DAY PROBIOTIC 4X 10-15 mg Tbec Generic drug:  B.infantis-B.ani-B.long-B.bifi Your next dose is:  Tomorrow morning 9/4 Take 1 Tab by mouth daily. 1 Tab VISION FORMULA (WITH LUTEIN) tablet Generic drug:  vit a,c & e-lutein-minerals Your next dose is:  Tomorrow morning 9/4 Take 1 Tab by mouth daily. 1 Tab * Notice: This list has 2 medication(s) that are the same as other medications prescribed for you. Read the directions carefully, and ask your doctor or other care provider to review them with you. Where to Get Your Medications Information on where to get these meds will be given to you by the nurse or doctor. ! Ask your nurse or doctor about these medications  
  clopidogrel 75 mg Tab  
 insulin glargine 300 unit/mL (1.5 mL) Inpn  
 insulin lispro 100 unit/mL injection  
 trimethoprim-sulfamethoxazole 160-800 mg per tablet Opioid Education Prescription Opioids: What You Need to Know: 
 
Prescription opioids can be used to help relieve moderate-to-severe pain and are often prescribed following a surgery or injury, or for certain health conditions. These medications can be an important part of treatment but also come with serious risks. Opioids are strong pain medicines. Examples include hydrocodone, oxycodone, fentanyl, and morphine. Heroin is an example of an illegal opioid. It is important to work with your health care provider to make sure you are getting the safest, most effective care. WHAT ARE THE RISKS AND SIDE EFFECTS OF OPIOID USE?  
Prescription opioids carry serious risks of addiction and overdose, especially with prolonged use. An opioid overdose, often marked by slow breathing, can cause sudden death. The use of prescription opioids can have a number of side effects as well, even when taken as directed. · Tolerance-meaning you might need to take more of a medication for the same pain relief · Physical dependence-meaning you have symptoms of withdrawal when the medication is stopped. Withdrawal symptoms can include nausea, sweating, chills, diarrhea, stomach cramps, and muscle aches. Withdrawal can last up to several weeks, depending on which drug you took and how long you took it. · Increased sensitivity to pain · Constipation · Nausea, vomiting, and dry mouth · Sleepiness and dizziness · Confusion · Depression · Low levels of testosterone that can result in lower sex drive, energy, and strength · Itching and sweating RISKS ARE GREATER WITH:      
· History of drug misuse, substance use disorder, or overdose · Mental health conditions (such as depression or anxiety) · Sleep apnea · Older age (72 years or older) · Pregnancy Avoid alcohol while taking prescription opioids. Also, unless specifically advised by your health care provider, medications to avoid include: · Benzodiazepines (such as Xanax or Valium) · Muscle relaxants (such as Soma or Flexeril) · Hypnotics (such as Ambien or Lunesta) · Other prescription opioids KNOW YOUR OPTIONS Talk to your health care provider about ways to manage your pain that don't involve prescription opioids. Some of these options may actually work better and have fewer risks and side effects. Options may include: 
· Pain relievers such as acetaminophen, ibuprofen, and naproxen · Some medications that are also used for depression or seizures · Physical therapy and exercise · Counseling to help patients learn how to cope better with triggers of pain and stress. · Application of heat or cold compress · Massage therapy · Relaxation techniques Be Informed Make sure you know the name of your medication, how much and how often to take it, and its potential risks & side effects. IF YOU ARE PRESCRIBED OPIOIDS FOR PAIN: 
· Never take opioids in greater amounts or more often than prescribed. Remember the goal is not to be pain-free but to manage your pain at a tolerable level. · Follow up with your primary care provider to: · Work together to create a plan on how to manage your pain. · Talk about ways to help manage your pain that don't involve prescription opioids. · Talk about any and all concerns and side effects. · Help prevent misuse and abuse. · Never sell or share prescription opioids · Help prevent misuse and abuse. · Store prescription opioids in a secure place and out of reach of others (this may include visitors, children, friends, and family). · Safely dispose of unused/unwanted prescription opioids: Find your community drug take-back program or your pharmacy mail-back program, or flush them down the toilet, following guidance from the Food and Drug Administration (www.fda.gov/Drugs/ResourcesForYou). · Visit www.cdc.gov/drugoverdose to learn about the risks of opioid abuse and overdose. · If you believe you may be struggling with addiction, tell your health care provider and ask for guidance or call [address] at 5-661-852-DYSN. Discharge Instructions Moniter blood sugars twice daily, keep a log and take to follow up appointment Stroke: After Your Visit Your Care Instructions You have had a stroke. Risk factors for stroke include being overweight, smoking, and sedentary lifestyle. This means that the blood flow to a part of your brain was blocked for some time, which damages the nerve cells in that part of the brain.  The part of your body controlled by that part of your brain may not function properly now. The brain is an amazing organ that can heal itself to some degree. The stroke you had damaged part of your brain, but other parts of your brain may take over in some way for the damaged areas. You have already started this process. Going home may be hard for you and your family. The more you can try to do for yourself, the better. Remember to take each day one at a time. Follow-up care is a key part of your treatment and safety. Be sure to make and go to all appointments, and call your doctor if you are having problems. Its also a good idea to know your test results and keep a list of the medicines you take. How can you care for yourself at home? Enter a stroke rehabilitation (rehab) program, if your doctor recommends it. Physical, speech, and occupational therapies can help you manage bathing, dressing, eating, and other basics of daily living. Eat a heart-healthy diet that is low in cholesterol, saturated fat, and salt. Eat lots of fresh fruits and vegetables and foods high in fiber. Increase your activities slowly. Take short rest breaks when you get tired. Gradually increase the amount you walk. Start out by walking a little more than you did the day before. Do not drive until your doctor says it is okay. It is normal to feel sad or depressed after a stroke. If the blues last, talk to your doctor. If you are having problems with urine leakage, go to the bathroom at regular times, including when you first wake up and at bedtime. Also, limit fluids after dinner. If you are constipated, drink plenty of fluids, enough so that your urine is light yellow or clear like water. If you have kidney, heart, or liver disease and have to limit fluids, talk with your doctor before you increase the amount of fluids you drink. Set up a regular time for using the toilet.  If you continue to have constipation, your doctor may suggest using a bulking agent, such as Metamucil, or a stool softener, laxative, or enema. Medicines Take your medicines exactly as prescribed. Call your doctor if you think you are having a problem with your medicine. You may be taking several medicines. ACE (angiotensin-converting enzyme) inhibitors, angiotensin II receptor blockers (ARBs), beta-blockers, diuretics (water pills), and calcium channel blockers control your blood pressure. Statins help lower cholesterol. Your doctor may also prescribe medicines for depression, pain, sleep problems, anxiety, or agitation. If your doctor has given you medicine that prevents blood clots, such as warfarin (Coumadin), aspirin combined with extended-release dipyridamole (Aggrenox), clopidogrel (Plavix), or aspirin to prevent another stroke, you should: 
Tell your dentist, pharmacist, and other health professionals that you take these medicines. Watch for unusual bruising or bleeding, such as blood in your urine, red or black stools, or bleeding from your nose or gums. Get regular blood tests to check your clotting time if you are taking Coumadin. Wear medical alert jewelry that says you take blood thinners. You can buy this at most [employer]. Do not take any over-the-counter medicines or herbal products without talking to your doctor first. 
If you take birth control pills or hormone replacement therapy, talk to your doctor about whether they are right for you. For family members and caregivers Make the home safe. Set up a room so that your loved one does not have to climb stairs. Be sure the bathroom is on the same floor. Move throw rugs and furniture that could cause falls, and make sure that the lighting is good. Put grab bars and seats in tubs and showers. Find out what your loved one can do and what he or she needs help with. Try not to do things for your loved one that your loved one can do on his or her own. Help him or her learn and practice new skills. Visit and talk with your loved one often. Try doing activities together that you both enjoy, such as playing cards or board games. Keep in touch with your loved one's friends as much as you can, and encourage them to visit. Take care of yourself. Do not try to do everything yourself. Ask other family members to help. Eat well, get enough rest, and take time to do things that you enjoy. Keep up with your own doctor visits, and make sure to take your medicines regularly. Get out of the house as much as you can. Join a local support group. Find out if you qualify for home health care visits to help with rehab or for adult day care. When should you call for help? Call 911 anytime you think you may need emergency care. For example, call if: 
You have signs of another stroke. These may include: 
Sudden numbness, paralysis, or weakness in your face, arm, or leg, especially on only one side of your body. New problems with walking or balance. Sudden vision changes. Drooling or slurred speech. New problems speaking or understanding simple statements, or you feel confused. A sudden, severe headache that is different from past headaches. Call 911 even if these symptoms go away in a few minutes. You cough up blood. You vomit blood or what looks like coffee grounds. You pass maroon or very bloody stools. Call your doctor now or seek immediate medical care if: 
You have new bruises or blood spots under your skin. You have a nosebleed. Your gums bleed when you brush your teeth. You have blood in your urine. Your stools are black and tarlike or have streaks of blood. You have vaginal bleeding when you are not having your period, or heavy period bleeding. You have new symptoms that may be related to your stroke, such as falls or trouble swallowing. Watch closely for changes in your health, and be sure to contact your doctor if you have any problems. Where can you learn more? Go to HotPads.be Enter S316  in the search box to learn more about \"Stroke: After Your Visit\". © 3448-0044 Healthwise, Incorporated. Care instructions adapted under license by [facility] [employer] (which disclaims liability or warranty for this information). This care instruction is for use with your licensed healthcare professional. If you have questions about a medical condition or this instruction, always ask your healthcare professional. Jaron Naik any warranty or liability for your use of this information. Urinary Tract Infection in Women: Care Instructions Your Care Instructions A urinary tract infection, or UTI, is a general term for an infection anywhere between the kidneys and the urethra (where urine comes out). Most UTIs are bladder infections. They often cause pain or burning when you urinate. UTIs are caused by bacteria and can be cured with antibiotics. Be sure to complete your treatment so that the infection goes away. Follow-up care is a key part of your treatment and safety. Be sure to make and go to all appointments, and call your doctor if you are having problems. It's also a good idea to know your test results and keep a list of the medicines you take. How can you care for yourself at home? · Take your antibiotics as directed. Do not stop taking them just because you feel better. You need to take the full course of antibiotics. · Drink extra water and other fluids for the next day or two. This may help wash out the bacteria that are causing the infection. (If you have kidney, heart, or liver disease and have to limit fluids, talk with your doctor before you increase your fluid intake.) · Avoid drinks that are carbonated or have caffeine. They can irritate the bladder. · Urinate often. Try to empty your bladder each time.  
· To relieve pain, take a hot bath or lay a heating pad set on low over your lower belly or genital area. Never go to sleep with a heating pad in place. To prevent UTIs · Drink plenty of water each day. This helps you urinate often, which clears bacteria from your system. (If you have kidney, heart, or liver disease and have to limit fluids, talk with your doctor before you increase your fluid intake.) · Urinate when you need to. · Urinate right after you have sex. · Change sanitary pads often. · Avoid douches, bubble baths, feminine hygiene sprays, and other feminine hygiene products that have deodorants. · After going to the bathroom, wipe from front to back. When should you call for help? Call your doctor now or seek immediate medical care if: 
  · Symptoms such as fever, chills, nausea, or vomiting get worse or appear for the first time.  
  · You have new pain in your back just below your rib cage. This is called flank pain.  
  · There is new blood or pus in your urine.  
  · You have any problems with your antibiotic medicine.  
 Watch closely for changes in your health, and be sure to contact your doctor if: 
  · You are not getting better after taking an antibiotic for 2 days.  
  · Your symptoms go away but then come back. Where can you learn more? Go to http://kevin-david.info/. Enter R635 in the search box to learn more about \"Urinary Tract Infection in Women: Care Instructions. \" Current as of: May 12, 2017 Content Version: 11.7 © 9304-7238 [employer]. Care instructions adapted under license by [employer] (which disclaims liability or warranty for this information). If you have questions about a medical condition or this instruction, always ask your healthcare professional. [place] [address] any warranty or liability for your use of this information. DISCHARGE SUMMARY from Nurse PATIENT INSTRUCTIONS: 
 
 
F-face looks uneven A-arms unable to move or move unevenly S-speech slurred or non-existent T-time-call 911 as soon as signs and symptoms begin-DO NOT go Back to bed or wait to see if you get better-TIME IS BRAIN. Warning Signs of HEART ATTACK Call 911 if you have these symptoms: 
? Chest discomfort. Most heart attacks involve discomfort in the center of the chest that lasts more than a few minutes, or that goes away and comes back. It can feel like uncomfortable pressure, squeezing, fullness, or pain. ? Discomfort in other areas of the upper body. Symptoms can include pain or discomfort in one or both arms, the back, neck, jaw, or stomach. ? Shortness of breath with or without chest discomfort. ? Other signs may include breaking out in a cold sweat, nausea, or lightheadedness. Don't wait more than five minutes to call [address]! Fast action can save your life. Calling 911 is almost always the fastest way to get lifesaving treatment. Emergency Medical Services staff can begin treatment when they arrive  up to an hour sooner than if someone gets to the hospital by car. The discharge information has been reviewed with the patient. The patient verbalized understanding. Discharge medications reviewed with the patient and appropriate educational materials and side effects teaching were provided. ___________________________________________________________________________________________________________________________________ MyChart Announcement We are excited to announce that we are making your provider's discharge notes available to you in [employer].   You will see these notes when they are completed and signed by the physician that discharged you from your recent hospital stay. If you have any questions or concerns about any information you see in MobiApps, please call the Health Information Department where you were seen or reach out to your Primary Care Provider for more information about your plan of care. Introducing [facility]! [facility] introduces MobiApps patient portal. Now you can access parts of your medical record, email your doctor's office, and request medication refills online. 1. In your internet browser, go to [email or web address] 2. Click on the First Time User? Click Here link in the Sign In box. You will see the New Member Sign Up page. 3. Enter your MobiApps Access Code exactly as it appears below. You will not need to use this code after youve completed the sign-up process. If you do not sign up before the expiration date, you must request a new code. · MobiApps Access Code: [email or web address] Expires: 9/10/2018 11:45 AM 
 
4. Enter the last four digits of your Social Security Number (xxxx) and Date of Birth (mm/dd/yyyy) as indicated and click Submit. You will be taken to the next sign-up page. 5. Create a MobiApps ID. This will be your MobiApps login ID and cannot be changed, so think of one that is secure and easy to remember. 6. Create a MobiApps password. You can change your password at any time. 7. Enter your Password Reset Question and Answer. This can be used at a later time if you forget your password. 8. Enter your e-mail address. You will receive e-mail notification when new information is available in [address] 9. Click Sign Up. You can now view and download portions of your medical record. 10. Click the Download Summary menu link to download a portable copy of your medical information. If you have questions, please visit the Frequently Asked Questions section of the MobiApps website. Remember, MobiApps is NOT to be used for urgent needs. For medical emergencies, dial 911. Now available from your iPhone and Android! Introducing Juni Hanson As a [employer] patient, I wanted to make you aware of our electronic visit tool called Juni Hanson. ShopReply allows you to connect within minutes with a medical provider 24 hours a day, seven days a week via a mobile device or tablet or logging into a secure website from your computer. You can access Juni Hanson from anywhere in the [place]. A virtual visit might be right for you when you have a simple condition and feel like you just dont want to get out of bed, or cant get away from work for an appointment, when your regular [employer] provider is not available (evenings, weekends or holidays), or when youre out of town and need minor care. Electronic visits cost only $49 and if the [employer]Reply provider determines a prescription is needed to treat your condition, one can be electronically transmitted to a nearby pharmacy*. Please take a moment to enroll today if you have not already done so. The enrollment process is free and takes just a few minutes. To enroll, please download the ShopReply jeni to your tablet or phone, or visit [email or web address] to enroll on your computer. And, as an [address] [place] [address] patient with a [employer] account, the results of your visits will be scanned into your electronic medical record and your primary care provider will be able to view the scanned results. We urge you to continue to see your regular [employer] provider for your ongoing medical care. And while your primary care provider may not be the one available when you seek a Juni Hanson virtual visit, the peace of mind you get from getting a real diagnosis real time can be priceless. For more information on Juni Hanson, view our Frequently Asked Questions (FAQs) at [email or web address]. Sincerely, 
 
Latricia Conrad MD 
Chief Medical Officer [address] [place] [address] *:  certain medications cannot be prescribed via Juni Hanson Providers Seen During Your Hospitalization Provider Specialty Primary office phone [address] [facility], [place] Emergency Medicine [phone] Esdras Pena MD Internal Medicine [phone] Your Primary Care Physician (PCP) Primary Care Physician Office Phone Office Fax Rivas Luther [phone] You are allergic to the following Allergen Reactions Codeine Other (comments) lightheadedness Tramadol Unknown (comments) HEADACHE, ONLY IF TAKEN AT NIGHT Recent Documentation Height Weight Breastfeeding? BMI OB Status Smoking Status 1.702 m 68 kg No 23.49 kg/m2 Postmenopausal Never Smoker Emergency Contacts Name Discharge Info Relation Home Work Mobile Neris Pedraza  Spouse [3] [phone] Patient Belongings The following personal items are in your possession at time of discharge: 
  Dental Appliances: None  Visual Aid: With patient, Glasses      Home Medications: None   Jewelry: Ring, With patient  Clothing: Footwear, Pants, Shirt, Undergarments, With patient    Other Valuables: None  Personal Items Sent to Safe: none Please provide this summary of care documentation to your next provider. Signatures-by signing, you are acknowledging that this After Visit Summary has been reviewed with you and you have received a copy. Patient Signature:  ____________________________________________________________ Date:  ____________________________________________________________  
  
Ashlie Johnson Provider Signature:  ____________________________________________________________ Date:  ____________________________________________________________

## 2018-08-31 NOTE — IP AVS SNAPSHOT
[address] 
[phone] Patient: Chip Carrel MRN: [id number] JQL:[birth year] A check monica indicates which time of day the medication should be taken. My Medications START taking these medications Instructions Each Dose to Equal  
 Morning Noon Evening Bedtime  
 clopidogrel 75 mg Tab Commonly known as:  PLAVIX Start taking on:  9/4/2018 Your last dose was: This morning 9/3 Your next dose is:  Tomorrow morning 9/4 Take 1 Tab by mouth daily. 75 mg  
    
  
   
   
   
  
 insulin lispro 100 unit/mL injection Commonly known as:  HUMALOG Your last dose was: This morning 9/3 Your next dose is: This evening and bedtime 9/3 Sliding scale as above for blood sugar levels before meals and at bedtime. For Blood Sugar (mg/dL) of:  Less than 150 = 0 units  150 -199 = 2 units 200 -249 = 4 units 250 -299 = 6 units 300 -349 = 8 units  
     
  
   
  
   
  
   
  
  
 trimethoprim-sulfamethoxazole 160-800 mg per tablet Commonly known as:  BACTRIM DS, SEPTRA DS Your next dose is: This evening 9/3 Take 1 Tab by mouth two (2) times a day. 1 Tab CHANGE how you take these medications Instructions Each Dose to Equal  
 Morning Noon Evening Bedtime  
 insulin glargine 300 unit/mL (1.5 mL) Inpn Commonly known as:  TOUJEO SOLOSTAR U-300 INSULIN What changed:   
- how much to take - Another medication with the same name was removed. Continue taking this medication, and follow the directions you see here. Your next dose is:  Tomorrow morning 9/4  
   
 18 Units by SubCUTAneous route Daily (before breakfast). 18 Units  
    
  
   
   
   
  
 traMADol 50 mg tablet Commonly known as:  ULTRAM  
What changed:  additional instructions Your next dose is: Take today if needed TAKE ONE TABLET BY MOUTH TWICE DAILY AS NEEDED FOR 30 DAYS CONTINUE taking these medications Instructions Each Dose to Equal  
 Morning Noon Evening Bedtime Alpha Lipoic Acid 600 mg Cap Your next dose is:  Tomorrow morning 9/4 Take 200 mg by mouth daily. 200 mg  
    
  
   
   
   
  
 aspirin delayed-release 81 mg tablet Your next dose is:  Tomorrow morning 9/4 Take 1 Tab by mouth daily. 81 mg  
    
  
   
   
   
  
 atorvastatin 80 mg tablet Commonly known as:  LIPITOR Your last dose was: This morning 9/3 Your next dose is:  Tomorrow morning 9/4 Take 1 Tab by mouth daily. Indications: hyperlipidemia 80 mg  
    
  
   
   
   
  
 BD ULTRA-FINE SHORT PEN NEEDLE 31 gauge x 5/16\" Ndle Generic drug:  Insulin Needles (Disposable) USE  TWICE DAILY AS DIRECTED WITH  INSULIN  PENS  
     
   
   
   
  
 CALCIUM-VITAMIN D PO Your last dose was: This morning 9/3 Your next dose is:  Tomorrow morning 9/4 Take 1 Tab by mouth daily. 1 Tab  
    
  
   
   
   
  
 cyanocobalamin 1,000 mcg tablet Your last dose was: This morning 9/3 Your next dose is:  Tomorrow morning 9/4 Take 1,000 mcg by mouth daily. 1000 mcg  
    
  
   
   
   
  
 cyclobenzaprine 10 mg tablet Commonly known as:  FLEXERIL Your last dose was: This morning 9/3 Your next dose is:  Tomorrow morning 9/4 Take 1 Tab by mouth daily. 10 mg  
    
  
   
   
   
  
 ezetimibe 10 mg tablet Commonly known as:  Sharon Monroy Your last dose was: This morning 9/3 Your next dose is:  Tomorrow morning 9/4 Take 1 Tab by mouth daily. 10 mg  
    
  
   
   
   
  
 gabapentin 600 mg tablet Commonly known as:  NEURONTIN Your last dose was: This morning 9/3 Your next dose is: This afternoon and evening 9/3 TAKE ONE TABLET BY MOUTH THREE TIMES DAILY glyBURIDE-metFORMIN 5-500 mg per tablet Commonly known as:  Kimber Gatica Your next dose is:  Tomorrow morning 9/4 Take 1 Tab by mouth Daily (before breakfast). Indications: type 2 diabetes mellitus 1 Tab  
    
  
   
   
   
  
 levothyroxine 100 mcg tablet Commonly known as:  SYNTHROID Your last dose was: This morning 9/3 Your next dose is:  Tomorrow morning 9/4 TAKE ONE TABLET BY MOUTH ONCE DAILY BEFORE BREAKFAST FOR HYPOTHYROIDISM  
     
  
   
   
   
  
 magnesium 250 mg Tab Your last dose was: This morning 9/3 Your next dose is:  Tomorrow morning 9/4 Take 250 mg by mouth daily. 250 mg MULTIVITAMIN PO Your last dose was: This morning 9/3 Your next dose is:  Tomorrow morning 9/4 Take 1 Tab by mouth daily. 1 Tab * ONETOUCH ULTRA TEST strip Generic drug:  glucose blood VI test strips TEST BLOOD SUGAR TWICE A   DAY  
     
   
   
   
  
 * ONETOUCH ULTRA BLUE TEST STRIP strip Generic drug:  glucose blood VI test strips USE TO TEST BLOOD SUGARS THREE TIMES A DAY PROBIOTIC 4X 10-15 mg Tbec Generic drug:  B.infantis-B.ani-B.long-B.bifi Your next dose is:  Tomorrow morning 9/4 Take 1 Tab by mouth daily. 1 Tab VISION FORMULA (WITH LUTEIN) tablet Generic drug:  vit a,c & e-lutein-minerals Your next dose is:  Tomorrow morning 9/4 Take 1 Tab by mouth daily. 1 Tab * Notice: This list has 2 medication(s) that are the same as other medications prescribed for you. Read the directions carefully, and ask your doctor or other care provider to review them with you. Where to Get Your Medications Information on where to get these meds will be given to you by the nurse or doctor. ! Ask your nurse or doctor about these medications  
  clopidogrel 75 mg Tab insulin glargine 300 unit/mL (1.5 mL) Inpn  
 insulin lispro 100 unit/mL injection  
 trimethoprim-sulfamethoxazole 160-800 mg per tablet

## 2018-09-01 ENCOUNTER — APPOINTMENT (OUTPATIENT)
Dept: CT IMAGING | Age: 68
DRG: 069 | End: 2018-09-01
Attending: EMERGENCY MEDICINE
Payer: MEDICARE

## 2018-09-01 ENCOUNTER — APPOINTMENT (OUTPATIENT)
Dept: MRI IMAGING | Age: 68
DRG: 069 | End: 2018-09-01
Attending: HOSPITALIST
Payer: MEDICARE

## 2018-09-01 PROBLEM — I63.9 ACUTE CVA (CEREBROVASCULAR ACCIDENT) (HCC): Status: ACTIVE | Noted: 2018-09-01

## 2018-09-01 PROBLEM — R47.9 DIFFICULTY WITH SPEECH: Status: ACTIVE | Noted: 2018-09-01

## 2018-09-01 LAB
ALBUMIN SERPL-MCNC: 3.2 G/DL (ref 3.2–4.6)
ALBUMIN/GLOB SERPL: 0.7 {RATIO} (ref 1.2–3.5)
ALP SERPL-CCNC: 140 U/L (ref 50–136)
ALT SERPL-CCNC: 78 U/L (ref 12–65)
ANION GAP SERPL CALC-SCNC: 7 MMOL/L (ref 7–16)
AST SERPL-CCNC: 83 U/L (ref 15–37)
ATRIAL RATE: 94 BPM
BACTERIA URNS QL MICRO: ABNORMAL /HPF
BILIRUB SERPL-MCNC: 1 MG/DL (ref 0.2–1.1)
BUN SERPL-MCNC: 14 MG/DL (ref 8–23)
CALCIUM SERPL-MCNC: 10 MG/DL (ref 8.3–10.4)
CALCULATED P AXIS, ECG09: 35 DEGREES
CALCULATED R AXIS, ECG10: -32 DEGREES
CALCULATED T AXIS, ECG11: 54 DEGREES
CASTS URNS QL MICRO: 0 /LPF
CHLORIDE SERPL-SCNC: 108 MMOL/L (ref 98–107)
CO2 SERPL-SCNC: 27 MMOL/L (ref 21–32)
CREAT SERPL-MCNC: 0.64 MG/DL (ref 0.6–1)
DIAGNOSIS, 93000: NORMAL
EPI CELLS #/AREA URNS HPF: 0 /HPF
GLOBULIN SER CALC-MCNC: 4.4 G/DL (ref 2.3–3.5)
GLUCOSE BLD STRIP.AUTO-MCNC: 259 MG/DL (ref 65–100)
GLUCOSE BLD STRIP.AUTO-MCNC: 330 MG/DL (ref 65–100)
GLUCOSE BLD STRIP.AUTO-MCNC: 331 MG/DL (ref 65–100)
GLUCOSE BLD STRIP.AUTO-MCNC: 347 MG/DL (ref 65–100)
GLUCOSE SERPL-MCNC: 276 MG/DL (ref 65–100)
P-R INTERVAL, ECG05: 150 MS
POTASSIUM SERPL-SCNC: 4.9 MMOL/L (ref 3.5–5.1)
PROT SERPL-MCNC: 7.6 G/DL (ref 6.3–8.2)
Q-T INTERVAL, ECG07: 346 MS
QRS DURATION, ECG06: 90 MS
QTC CALCULATION (BEZET), ECG08: 432 MS
RBC #/AREA URNS HPF: 0 /HPF
SODIUM SERPL-SCNC: 142 MMOL/L (ref 136–145)
VENTRICULAR RATE, ECG03: 94 BPM
WBC URNS QL MICRO: ABNORMAL /HPF

## 2018-09-01 PROCEDURE — 87186 SC STD MICRODIL/AGAR DIL: CPT

## 2018-09-01 PROCEDURE — 74011250636 HC RX REV CODE- 250/636: Performed by: HOSPITALIST

## 2018-09-01 PROCEDURE — 87086 URINE CULTURE/COLONY COUNT: CPT

## 2018-09-01 PROCEDURE — 74011250637 HC RX REV CODE- 250/637: Performed by: HOSPITALIST

## 2018-09-01 PROCEDURE — 74011636637 HC RX REV CODE- 636/637: Performed by: FAMILY MEDICINE

## 2018-09-01 PROCEDURE — 97161 PT EVAL LOW COMPLEX 20 MIN: CPT

## 2018-09-01 PROCEDURE — 74011250637 HC RX REV CODE- 250/637: Performed by: EMERGENCY MEDICINE

## 2018-09-01 PROCEDURE — 74011636637 HC RX REV CODE- 636/637: Performed by: HOSPITALIST

## 2018-09-01 PROCEDURE — 97530 THERAPEUTIC ACTIVITIES: CPT

## 2018-09-01 PROCEDURE — 81015 MICROSCOPIC EXAM OF URINE: CPT

## 2018-09-01 PROCEDURE — 74011636320 HC RX REV CODE- 636/320: Performed by: EMERGENCY MEDICINE

## 2018-09-01 PROCEDURE — 74011000258 HC RX REV CODE- 258: Performed by: EMERGENCY MEDICINE

## 2018-09-01 PROCEDURE — 92610 EVALUATE SWALLOWING FUNCTION: CPT

## 2018-09-01 PROCEDURE — 97166 OT EVAL MOD COMPLEX 45 MIN: CPT

## 2018-09-01 PROCEDURE — 82962 GLUCOSE BLOOD TEST: CPT

## 2018-09-01 PROCEDURE — 65660000000 HC RM CCU STEPDOWN

## 2018-09-01 PROCEDURE — 87088 URINE BACTERIA CULTURE: CPT

## 2018-09-01 PROCEDURE — 70551 MRI BRAIN STEM W/O DYE: CPT

## 2018-09-01 PROCEDURE — 77030020263 HC SOL INJ SOD CL0.9% LFCR 1000ML

## 2018-09-01 PROCEDURE — 74011000258 HC RX REV CODE- 258: Performed by: HOSPITALIST

## 2018-09-01 PROCEDURE — 97535 SELF CARE MNGMENT TRAINING: CPT

## 2018-09-01 PROCEDURE — 70496 CT ANGIOGRAPHY HEAD: CPT

## 2018-09-01 RX ORDER — ACETAMINOPHEN 325 MG/1
650 TABLET ORAL
Status: DISCONTINUED | OUTPATIENT
Start: 2018-09-01 | End: 2018-09-03 | Stop reason: HOSPADM

## 2018-09-01 RX ORDER — GUAIFENESIN 100 MG/5ML
LIQUID (ML) ORAL
Status: ACTIVE
Start: 2018-09-01 | End: 2018-09-01

## 2018-09-01 RX ORDER — LANOLIN ALCOHOL/MO/W.PET/CERES
1000 CREAM (GRAM) TOPICAL DAILY
Status: DISCONTINUED | OUTPATIENT
Start: 2018-09-01 | End: 2018-09-03 | Stop reason: HOSPADM

## 2018-09-01 RX ORDER — GUAIFENESIN 100 MG/5ML
81 LIQUID (ML) ORAL DAILY
Status: DISCONTINUED | OUTPATIENT
Start: 2018-09-02 | End: 2018-09-03 | Stop reason: HOSPADM

## 2018-09-01 RX ORDER — SODIUM CHLORIDE 0.9 % (FLUSH) 0.9 %
5-10 SYRINGE (ML) INJECTION AS NEEDED
Status: DISCONTINUED | OUTPATIENT
Start: 2018-09-01 | End: 2018-09-01 | Stop reason: SDUPTHER

## 2018-09-01 RX ORDER — EZETIMIBE 10 MG/1
10 TABLET ORAL DAILY
Status: DISCONTINUED | OUTPATIENT
Start: 2018-09-01 | End: 2018-09-03 | Stop reason: HOSPADM

## 2018-09-01 RX ORDER — SODIUM CHLORIDE 9 MG/ML
100 INJECTION, SOLUTION INTRAVENOUS CONTINUOUS
Status: DISCONTINUED | OUTPATIENT
Start: 2018-09-01 | End: 2018-09-01

## 2018-09-01 RX ORDER — GABAPENTIN 300 MG/1
600 CAPSULE ORAL 3 TIMES DAILY
Status: DISCONTINUED | OUTPATIENT
Start: 2018-09-01 | End: 2018-09-03 | Stop reason: HOSPADM

## 2018-09-01 RX ORDER — CALCIUM CARBONATE/VITAMIN D3 250-3.125
1 TABLET ORAL DAILY
Status: DISCONTINUED | OUTPATIENT
Start: 2018-09-01 | End: 2018-09-03 | Stop reason: HOSPADM

## 2018-09-01 RX ORDER — INSULIN GLARGINE 100 [IU]/ML
10 INJECTION, SOLUTION SUBCUTANEOUS DAILY
Status: DISCONTINUED | OUTPATIENT
Start: 2018-09-01 | End: 2018-09-02

## 2018-09-01 RX ORDER — LEVOTHYROXINE SODIUM 100 UG/1
100 TABLET ORAL
Status: DISCONTINUED | OUTPATIENT
Start: 2018-09-01 | End: 2018-09-03 | Stop reason: HOSPADM

## 2018-09-01 RX ORDER — LORAZEPAM 2 MG/ML
1 INJECTION INTRAMUSCULAR ONCE
Status: COMPLETED | OUTPATIENT
Start: 2018-09-01 | End: 2018-09-01

## 2018-09-01 RX ORDER — ONDANSETRON 2 MG/ML
4 INJECTION INTRAMUSCULAR; INTRAVENOUS
Status: DISCONTINUED | OUTPATIENT
Start: 2018-09-01 | End: 2018-09-03 | Stop reason: HOSPADM

## 2018-09-01 RX ORDER — GUAIFENESIN 100 MG/5ML
162 LIQUID (ML) ORAL
Status: COMPLETED | OUTPATIENT
Start: 2018-09-01 | End: 2018-09-01

## 2018-09-01 RX ORDER — CYCLOBENZAPRINE HCL 10 MG
10 TABLET ORAL DAILY
Status: DISCONTINUED | OUTPATIENT
Start: 2018-09-01 | End: 2018-09-03 | Stop reason: HOSPADM

## 2018-09-01 RX ORDER — ATORVASTATIN CALCIUM 40 MG/1
80 TABLET, FILM COATED ORAL DAILY
Status: DISCONTINUED | OUTPATIENT
Start: 2018-09-01 | End: 2018-09-03 | Stop reason: HOSPADM

## 2018-09-01 RX ORDER — SAME BUTANEDISULFONATE/BETAINE 400-600 MG
250 POWDER IN PACKET (EA) ORAL 2 TIMES DAILY
Status: DISCONTINUED | OUTPATIENT
Start: 2018-09-01 | End: 2018-09-03 | Stop reason: HOSPADM

## 2018-09-01 RX ORDER — ENOXAPARIN SODIUM 100 MG/ML
40 INJECTION SUBCUTANEOUS EVERY 24 HOURS
Status: DISCONTINUED | OUTPATIENT
Start: 2018-09-01 | End: 2018-09-03 | Stop reason: HOSPADM

## 2018-09-01 RX ORDER — LANOLIN ALCOHOL/MO/W.PET/CERES
200 CREAM (GRAM) TOPICAL DAILY
Status: DISCONTINUED | OUTPATIENT
Start: 2018-09-01 | End: 2018-09-03 | Stop reason: HOSPADM

## 2018-09-01 RX ORDER — CLOPIDOGREL BISULFATE 75 MG/1
75 TABLET ORAL DAILY
Status: DISCONTINUED | OUTPATIENT
Start: 2018-09-01 | End: 2018-09-03 | Stop reason: HOSPADM

## 2018-09-01 RX ORDER — DEXTROSE 40 %
15 GEL (GRAM) ORAL AS NEEDED
Status: DISCONTINUED | OUTPATIENT
Start: 2018-09-01 | End: 2018-09-03 | Stop reason: HOSPADM

## 2018-09-01 RX ORDER — ASPIRIN 325 MG
325 TABLET ORAL DAILY
Status: DISCONTINUED | OUTPATIENT
Start: 2018-09-01 | End: 2018-09-01

## 2018-09-01 RX ORDER — BISACODYL 5 MG
5 TABLET, DELAYED RELEASE (ENTERIC COATED) ORAL DAILY PRN
Status: DISCONTINUED | OUTPATIENT
Start: 2018-09-01 | End: 2018-09-03 | Stop reason: HOSPADM

## 2018-09-01 RX ORDER — SODIUM CHLORIDE 0.9 % (FLUSH) 0.9 %
10 SYRINGE (ML) INJECTION
Status: COMPLETED | OUTPATIENT
Start: 2018-09-01 | End: 2018-09-01

## 2018-09-01 RX ORDER — SODIUM CHLORIDE 0.9 % (FLUSH) 0.9 %
5-10 SYRINGE (ML) INJECTION EVERY 8 HOURS
Status: DISCONTINUED | OUTPATIENT
Start: 2018-09-01 | End: 2018-09-01 | Stop reason: SDUPTHER

## 2018-09-01 RX ORDER — DEXTROSE 50 % IN WATER (D50W) INTRAVENOUS SYRINGE
25-50 AS NEEDED
Status: DISCONTINUED | OUTPATIENT
Start: 2018-09-01 | End: 2018-09-03 | Stop reason: HOSPADM

## 2018-09-01 RX ORDER — INSULIN LISPRO 100 [IU]/ML
INJECTION, SOLUTION INTRAVENOUS; SUBCUTANEOUS
Status: DISCONTINUED | OUTPATIENT
Start: 2018-09-01 | End: 2018-09-03 | Stop reason: HOSPADM

## 2018-09-01 RX ORDER — OXYBUTYNIN CHLORIDE 10 MG/1
10 TABLET, EXTENDED RELEASE ORAL DAILY
Status: DISCONTINUED | OUTPATIENT
Start: 2018-09-01 | End: 2018-09-03 | Stop reason: HOSPADM

## 2018-09-01 RX ADMIN — EZETIMIBE 10 MG: 10 TABLET ORAL at 09:23

## 2018-09-01 RX ADMIN — ENOXAPARIN SODIUM 40 MG: 40 INJECTION, SOLUTION INTRAVENOUS; SUBCUTANEOUS at 09:24

## 2018-09-01 RX ADMIN — SODIUM CHLORIDE 100 ML: 900 INJECTION, SOLUTION INTRAVENOUS at 00:22

## 2018-09-01 RX ADMIN — Medication 200 MG: at 09:24

## 2018-09-01 RX ADMIN — Medication 5 ML: at 05:34

## 2018-09-01 RX ADMIN — ATORVASTATIN CALCIUM 80 MG: 40 TABLET, FILM COATED ORAL at 09:23

## 2018-09-01 RX ADMIN — INSULIN LISPRO 8 UNITS: 100 INJECTION, SOLUTION INTRAVENOUS; SUBCUTANEOUS at 12:02

## 2018-09-01 RX ADMIN — Medication 250 MG: at 09:23

## 2018-09-01 RX ADMIN — SODIUM CHLORIDE 100 ML/HR: 900 INJECTION, SOLUTION INTRAVENOUS at 02:26

## 2018-09-01 RX ADMIN — INSULIN GLARGINE 10 UNITS: 100 INJECTION, SOLUTION SUBCUTANEOUS at 16:00

## 2018-09-01 RX ADMIN — CALCIUM CARBONATE-CHOLECALCIFEROL TAB 250 MG-125 UNIT 1 TABLET: 250-125 TAB at 09:23

## 2018-09-01 RX ADMIN — GABAPENTIN 600 MG: 300 CAPSULE ORAL at 09:22

## 2018-09-01 RX ADMIN — INSULIN LISPRO 6 UNITS: 100 INJECTION, SOLUTION INTRAVENOUS; SUBCUTANEOUS at 16:56

## 2018-09-01 RX ADMIN — ASPIRIN 325 MG ORAL TABLET 325 MG: 325 PILL ORAL at 09:23

## 2018-09-01 RX ADMIN — ASPIRIN 81 MG 162 MG: 81 TABLET ORAL at 01:34

## 2018-09-01 RX ADMIN — Medication 10 ML: at 00:22

## 2018-09-01 RX ADMIN — Medication 250 MG: at 17:34

## 2018-09-01 RX ADMIN — INSULIN LISPRO 8 UNITS: 100 INJECTION, SOLUTION INTRAVENOUS; SUBCUTANEOUS at 09:21

## 2018-09-01 RX ADMIN — Medication 5 ML: at 02:31

## 2018-09-01 RX ADMIN — IOPAMIDOL 80 ML: 755 INJECTION, SOLUTION INTRAVENOUS at 00:22

## 2018-09-01 RX ADMIN — CYANOCOBALAMIN TAB 1000 MCG 1000 MCG: 1000 TAB at 09:22

## 2018-09-01 RX ADMIN — MULTIPLE VITAMINS W/ MINERALS TAB 1 TABLET: TAB at 09:23

## 2018-09-01 RX ADMIN — LEVOTHYROXINE SODIUM 100 MCG: 100 TABLET ORAL at 05:34

## 2018-09-01 RX ADMIN — INSULIN LISPRO 8 UNITS: 100 INJECTION, SOLUTION INTRAVENOUS; SUBCUTANEOUS at 22:41

## 2018-09-01 RX ADMIN — Medication 10 ML: at 22:44

## 2018-09-01 RX ADMIN — CEFTRIAXONE SODIUM 1 G: 1 INJECTION, POWDER, FOR SOLUTION INTRAMUSCULAR; INTRAVENOUS at 05:28

## 2018-09-01 RX ADMIN — Medication 5 ML: at 17:05

## 2018-09-01 RX ADMIN — LORAZEPAM 1 MG: 2 INJECTION INTRAMUSCULAR; INTRAVENOUS at 10:24

## 2018-09-01 RX ADMIN — CLOPIDOGREL BISULFATE 75 MG: 75 TABLET ORAL at 09:24

## 2018-09-01 RX ADMIN — GABAPENTIN 600 MG: 300 CAPSULE ORAL at 22:43

## 2018-09-01 RX ADMIN — CYCLOBENZAPRINE HYDROCHLORIDE 10 MG: 10 TABLET, FILM COATED ORAL at 09:23

## 2018-09-01 RX ADMIN — GABAPENTIN 600 MG: 300 CAPSULE ORAL at 17:34

## 2018-09-01 NOTE — ED PROVIDER NOTES
HPI: 
80 female prior stroke is here with complaint of dizziness that started 1 hour prior to arrival.  Stated she was watching TV. Got up to move to try to go to bed when she became acutely dizzy and unsteady. Stated she felt like she is in a fish bowl. Denies falling. Has had a prior stroke. Blood sugar has been elevated. Glucose of 300 today prior to coming in. Denies any new unilateral weakness tingling or numbness. She denies any chest pain, shortness of breath. ROS Constitutional: No fever, no chills Skin: no rash Eye:  
ENMT: No sore throat Respiratory: No shortness of breath, no cough Cardiovascular: No chest pain, no palpitations Gastrointestinal: No vomiting, no nausea, no diarrhea, no abdominal pain :  
MSK: No back pain, no muscle pain Neuro: no change in mental status, no numbness, no tingling, no weakness Psych:  
Endocrine:  
All other review of systems positive per history of present illness and the above otherwise negative or noncontributory. Visit Vitals  /78 (BP 1 Location: Left arm, BP Patient Position: At rest;Sitting)  Pulse 86  Temp 97.9 °F (36.6 °C)  Resp 18  Ht 5' 7\" (1.702 m)  Wt 68 kg (150 lb)  SpO2 96%  BMI 23.49 kg/m2 Past Medical History:  
Diagnosis Date  Chronic musculoskeletal pain  CVA (cerebral vascular accident) ([facility] [address] 07/2018  
 right pontine  Cystocele with rectocele H/O  
 Essential hypertension 2/9/2016  GERD (gastroesophageal reflux disease) diet controlled  Hip fracture requiring operative repair ([facility] [address] 1/9/2015 H/O LFT HIP REPAIR  
 Hip fracture, left ([address] 1/7/2015  Hyperlipidemia  Hypothyroidism  Impaired mobility and ADLs  Pure hypercholesterolemia 2/9/2016  Type 2 diabetes mellitus without complication ([address] 5/3/8821 Past Surgical History:  
Procedure Laterality Date  HX COLONOSCOPY    
 HX DILATION AND CURETTAGE    
 HX HIP FRACTURE TX Left  HX MT AND BSO  HX TONSILLECTOMY  HX TUBAL LIGATION Prior to Admission Medications Prescriptions Last Dose Informant Patient Reported? Taking? Alpha Lipoic Acid 600 mg cap   Yes No  
Sig: Take 200 mg by mouth daily. B.infantis-B.ani-B.long-B.bifi (PROBIOTIC 4X) 10-15 mg TbEC   Yes No  
Sig: Take  by mouth. BD INSULIN PEN NEEDLE UF SHORT 31 gauge x 5/16\" ndle   No No  
Sig: USE  TWICE DAILY AS DIRECTED WITH  INSULIN  PENS  
CALCIUM CARB/VIT D3/MINERALS (CALCIUM-VITAMIN D PO)   Yes No  
Sig: Take  by mouth. HUMALOG KWIKPEN 100 unit/mL kwikpen   No No  
Sig: INJECT 40  UNITS SUBCUTANEOUSLY AT BEDTIME MULTIVITAMIN PO   Yes No  
Sig: Take  by mouth. ONETOUCH ULTRA BLUE TEST STRIP strip   No No  
Sig: USE TO TEST BLOOD SUGARS THREE TIMES A DAY  
ONETOUCH ULTRA TEST strip   No No  
Sig: TEST BLOOD SUGAR TWICE A   DAY  
TOUJEO SOLOSTAR 300 unit/mL (1.5 mL) inpn   No No  
Sig: INJECT 70  UNITS SUBCUTANEOUSLY ONCE DAILY WITH DINNER Patient taking differently: 25 units AM & 50 units PM  
TURMERIC, BULK,   Yes No  
Sig: by Does Not Apply route. aspirin delayed-release 81 mg tablet   No No  
Sig: Take 1 Tab by mouth daily. atorvastatin (LIPITOR) 80 mg tablet   No No  
Sig: Take 1 Tab by mouth daily. Indications: hyperlipidemia  
cyanocobalamin 1,000 mcg tablet   Yes No  
Sig: Take 1,000 mcg by mouth daily. cyclobenzaprine (FLEXERIL) 10 mg tablet   No No  
Sig: Take 1 Tab by mouth daily. ezetimibe (ZETIA) 10 mg tablet   No No  
Sig: Take 1 Tab by mouth daily. gabapentin (NEURONTIN) 600 mg tablet   No No  
Sig: TAKE ONE TABLET BY MOUTH THREE TIMES DAILY  
glyBURIDE-metFORMIN (GLUCOVANCE) 5-500 mg per tablet   No No  
Sig: Take 1 Tab by mouth Daily (before breakfast).  Indications: type 2 diabetes mellitus  
levothyroxine (SYNTHROID) 100 mcg tablet   No No  
Sig: TAKE ONE TABLET BY MOUTH ONCE DAILY BEFORE BREAKFAST FOR HYPOTHYROIDISM  
magnesium 250 mg tab   Yes No  
 Sig: Take 250 mg by mouth daily. meloxicam (MOBIC) 15 mg tablet   No No  
Sig: Take 1 Tab by mouth daily. oxybutynin chloride XL (DITROPAN XL) 10 mg CR tablet   No No  
Sig: Take 1 Tab by mouth daily. solifenacin (VESICARE) 10 mg tablet   Yes No  
Sig: Take 10 mg by mouth daily. traMADol (ULTRAM) 50 mg tablet   No No  
Sig: TAKE ONE TABLET BY MOUTH TWICE DAILY AS NEEDED FOR 30 DAYS Patient taking differently: TAKE ONE TABLET BY MOUTH TWICE DAILY AS NEEDED F  Indications: Pain, NEUROPATHIC PAIN  
vit a,c & e-lutein-minerals (VISION FORMULA, WITH LUTEIN,) tablet   Yes No  
Sig: Take 1 Tab by mouth daily. Facility-Administered Medications: None Adult Exam  
General: alert, no acute distress Head: normocephalic, atraumatic ENT: moist mucous membranes Neck: supple, non-tender; full range of motion Cardiovascular: regular rate and rhythm, normal peripheral perfusion, no edema Respiratory:  normal respirations; no wheezing, rales or rhonchi Gastrointestinal: soft, non-tender; no rebound or guarding, no peritoneal signs, no distension Back: non-tender, full range of motion Musculoskeletal: normal range of motion, normal strength, no gross deformities Neurological: alert and oriented x 4, normal speech, no facial droop. Left pronator drift. Patient is unsteady on Romberg. Needed help getting up. On finger-nose exam the left appears somewhat ataxic compared to the right Strength are roughly equal. Sensation intact bilat. Psychiatric: cooperative; appropriate mood and affect MDM: prior stroke history. Pronator drift on the left. Difficulty standing. Unsteady on Romberg. Code stroke activated. Patient's CT head unremarkable. Requests stroke neurology evaluation emergently. Lower suspicion for dissection. NIH score roughly 4 with limb ataxia, mild dysarthria but understandable with mild decreased sensation on the left side ED Course 18 - I spoke with the telemetry neurologist.  Patient had recent stroke in July. She is not a TPA candidate at this time. He would recommend CTA head and neck for further assessment of large vessel occlusion and posterior circulation stroke. If negative for any acute large vessel would recommend MR and admission. CT head without any acute large territorial lesion, hemorrhage. NIH up for per stroke neurologist 
 
0100 - preliminary read of CTA head and neck by radiology without acute signs of large vessel occlusion. I spoke with the hospitalist.  Patient will stay here for further MRI and stroke workup. We'll give 2 additional baby aspirin. Her vital signs are currently stable. Ct Head Wo Cont Result Date: 8/31/2018 CT HEAD WITHOUT CONTRAST HISTORY:  CVA. COMPARISON: MRI of the brain dated 7/9/2018 TECHNIQUE: Axial imaging was performed without intravenous contrast utilizing 5mm slice thickness. Sagittal and coronal reformats were performed. Radiation dose reduction techniques were used for this study. Our CT scanner uses one or all of the following: Automated exposure control, adjustment of the MAS or KUB according to patient's size and iterative reconstruction. FINDINGS:    *BRAIN:    -  There are no early signs of territorial or lacunar infarction by CT.    -  No intracranial mass, hematoma, or hydrocephalus. -  For patient's age, the scattered areas of white matter hypodensities may represent a chronic small vessel white matter ischemia. However this is nonspecific. *VISUALIZED PARANASAL SINUSES: Well aerated. *MASTOIDS:  Clear. *CALVARIUM AND SCALP: Unremarkable. IMPRESSION: No acute intracranial abnormalities. Date of Dictation: 8/31/2018 11:33 PM  
 
Recent Results (from the past 24 hour(s)) GLUCOSE, POC Collection Time: 08/31/18 11:21 PM  
Result Value Ref Range Glucose (POC) 270 (H) 65 - 100 mg/dL Q1 [facility] voice recognition software was used to create this note. Although the note has been reviewed and corrected where necessary, additional errors may have been overlooked and remain in the text.

## 2018-09-01 NOTE — PROGRESS NOTES
Progress Note Patient: Gamaliel Kiran MRN: [id number]  SSN: [id number] YOB: 1950  Age: 80 y.o. Sex: female Admit Date: 8/31/2018 LOS: 0 days Subjective: F/U difficulty with speech. Patient did not sleep well so she is having a hard time giving me hx. Some mild slurred speech noted. No chest pain or SOB. Glucose levels in the 300s. Patient states this is her average at home. Urine culture pending CTA head neck showed no evidence of acute intracranial or cervical arterial disease MRI showed Chronic changes of mild atrophy and probable small vessel disease 
and small remote pontine infarct. No acute infarct. Current Facility-Administered Medications Medication Dose Route Frequency  calcium-vitamin D (OSCAL 250) tablet 1 Tab  1 Tab Oral DAILY  multivitamin, tx-iron-ca-min (THERA-M w/ IRON) tablet 1 Tab  1 Tab Oral DAILY  levothyroxine (SYNTHROID) tablet 100 mcg  100 mcg Oral 6am  
 cyanocobalamin tablet 1,000 mcg  1,000 mcg Oral DAILY  atorvastatin (LIPITOR) tablet 80 mg  80 mg Oral DAILY  oxybutynin chloride XL (DITROPAN XL) tablet 10 mg  10 mg Oral DAILY  ezetimibe (ZETIA) tablet 10 mg  10 mg Oral DAILY  cyclobenzaprine (FLEXERIL) tablet 10 mg  10 mg Oral DAILY  gabapentin (NEURONTIN) capsule 600 mg  600 mg Oral TID  magnesium oxide (MAG-OX) tablet 200 mg  200 mg Oral DAILY  Saccharomyces boulardii (FLORASTOR) capsule 250 mg  250 mg Oral BID  
 0.9% sodium chloride infusion  100 mL/hr IntraVENous CONTINUOUS  
 sodium chloride (NS) flush 5-10 mL  5-10 mL IntraVENous Q8H  
 sodium chloride (NS) flush 5-10 mL  5-10 mL IntraVENous PRN  
 ondansetron (ZOFRAN) injection 4 mg  4 mg IntraVENous Q6H PRN  
 enoxaparin (LOVENOX) injection 40 mg  40 mg SubCUTAneous Q24H  
 dextrose 40% (GLUTOSE) oral gel 1 Tube  15 g Oral PRN  
 glucagon (GLUCAGEN) injection 1 mg  1 mg IntraMUSCular PRN  
  dextrose (D50W) injection syrg 12.5-25 g  25-50 mL IntraVENous PRN  
 insulin lispro (HUMALOG) injection   SubCUTAneous AC&HS  clopidogrel (PLAVIX) tablet 75 mg  75 mg Oral DAILY  aspirin (ASPIRIN) tablet 325 mg  325 mg Oral DAILY  acetaminophen (TYLENOL) tablet 650 mg  650 mg Oral Q4H PRN  
 bisacodyl (DULCOLAX) tablet 5 mg  5 mg Oral DAILY PRN  
 cefTRIAXone (ROCEPHIN) 1 g in 0.9% sodium chloride (MBP/ADV) 50 mL  1 g IntraVENous Q24H  
 sodium chloride (NS) flush 5-10 mL  5-10 mL IntraVENous Q8H  
 sodium chloride (NS) flush 5-10 mL  5-10 mL IntraVENous PRN Objective:  
 
Vitals:  
 09/01/18 0222 09/01/18 0400 09/01/18 0741 09/01/18 1130 BP: 131/70 152/74 123/68 142/78 Pulse: 86 84 92 Resp: 17 18 19 19 Temp: 98 °F (36.7 °C) 97.5 °F (36.4 °C) 97.9 °F (36.6 °C) 98 °F (36.7 °C) SpO2: 93% 95% 95% 94% Weight:      
Height:      
  
  
Intake and Output: 
Current Shift: 09/01 0701 - 09/01 1900 In: 120 [P.O.:120] Out: 1 Last three shifts:   
 
Physical Exam:  
General:  Alert, but continues to fall asleep. Eyes:  Conjunctivae/corneas clear. PERRL, EOMs intact. Fundi benign Ears:  Normal TMs and external ear canals both ears. Nose: Nares normal. Septum midline. Mucosa normal. No drainage or sinus tenderness. Mouth/Throat: Lips, mucosa, and tongue normal. Teeth and gums normal.  
Neck: no JVD. Back:   Symmetric, no curvature. ROM normal. No CVA tenderness. Lungs:   Clear to auscultation bilaterally. Heart:  Regular rate and rhythm, S1, S2 normal, no murmur, click, rub or gallop. Abdomen:   Soft, non-tender. Bowel sounds normal. No masses,  No organomegaly. Extremities: Extremities normal, atraumatic, no cyanosis or edema. 5/5 strength of UE and LE bilaterally. No facial droop. No tongue deviation. Pulses: 2+ and symmetric all extremities. Skin: Skin color, texture, turgor normal. No rashes or lesions Lymph nodes: Cervical, supraclavicular, and axillary nodes normal.  
Neurologic:  Normal strength, sensation and reflexes throughout. Lab/Data Review: 
 
Recent Results (from the past 24 hour(s)) GLUCOSE, POC Collection Time: 08/31/18 11:21 PM  
Result Value Ref Range Glucose (POC) 270 (H) 65 - 100 mg/dL CBC WITH AUTOMATED DIFF Collection Time: 08/31/18 11:33 PM  
Result Value Ref Range WBC 5.2 4.3 - 11.1 K/uL  
 RBC 4.48 4.05 - 5.2 M/uL  
 HGB 11.9 11.7 - 15.4 g/dL HCT 37.4 35.8 - 46.3 % MCV 83.5 79.6 - 97.8 FL  
 MCH 26.6 26.1 - 32.9 PG  
 MCHC 31.8 31.4 - 35.0 g/dL  
 RDW 17.1 % PLATELET 312 (L) 505 - 450 K/uL MPV 11.7 9.4 - 12.3 FL ABSOLUTE NRBC 0.00 0.0 - 0.2 K/uL  
 DF AUTOMATED NEUTROPHILS 54 43 - 78 % LYMPHOCYTES 29 13 - 44 % MONOCYTES 11 4.0 - 12.0 % EOSINOPHILS 5 0.5 - 7.8 % BASOPHILS 1 0.0 - 2.0 % IMMATURE GRANULOCYTES 0 0.0 - 5.0 %  
 ABS. NEUTROPHILS 2.8 1.7 - 8.2 K/UL  
 ABS. LYMPHOCYTES 1.5 0.5 - 4.6 K/UL  
 ABS. MONOCYTES 0.6 0.1 - 1.3 K/UL  
 ABS. EOSINOPHILS 0.3 0.0 - 0.8 K/UL  
 ABS. BASOPHILS 0.0 0.0 - 0.2 K/UL  
 ABS. IMM. GRANS. 0.0 0.0 - 0.5 K/UL METABOLIC PANEL, COMPREHENSIVE Collection Time: 08/31/18 11:33 PM  
Result Value Ref Range Sodium 142 136 - 145 mmol/L Potassium 4.9 3.5 - 5.1 mmol/L Chloride 108 (H) 98 - 107 mmol/L  
 CO2 27 21 - 32 mmol/L Anion gap 7 7 - 16 mmol/L Glucose 276 (H) 65 - 100 mg/dL BUN 14 8 - 23 MG/DL Creatinine 0.64 0.6 - 1.0 MG/DL  
 GFR est AA >60 >60 ml/min/1.73m2 GFR est non-AA >60 >60 ml/min/1.73m2 Calcium 10.0 8.3 - 10.4 MG/DL Bilirubin, total 1.0 0.2 - 1.1 MG/DL  
 ALT (SGPT) 78 (H) 12 - 65 U/L  
 AST (SGOT) 83 (H) 15 - 37 U/L Alk. phosphatase 140 (H) 50 - 136 U/L Protein, total 7.6 6.3 - 8.2 g/dL Albumin 3.2 3.2 - 4.6 g/dL Globulin 4.4 (H) 2.3 - 3.5 g/dL A-G Ratio 0.7 (L) 1.2 - 3.5 EKG, 12 LEAD, INITIAL  Collection Time: 09/01/18  1:33 AM  
 Result Value Ref Range Ventricular Rate 94 BPM  
 Atrial Rate 94 BPM  
 P-R Interval 150 ms QRS Duration 90 ms Q-T Interval 346 ms  
 QTC Calculation (Bezet) 432 ms Calculated P Axis 35 degrees Calculated R Axis -32 degrees Calculated T Axis 54 degrees Diagnosis    
  !! AGE AND GENDER SPECIFIC ECG ANALYSIS !! Normal sinus rhythm Left axis deviation Inferior infarct (cited on or before 23-MAY-2013) Anterolateral infarct , age undetermined Abnormal ECG When compared with ECG of 09-JUL-2018 01:45, Anterior infarct is now Present Anterolateral infarct is now Present Confirmed by ST JOSE DEVLIN MD (), CRISTIAN PICHARDO ([id number]) on 9/1/2018 7:18:37 AM 
  
URINE MICROSCOPIC Collection Time: 09/01/18  1:38 AM  
Result Value Ref Range WBC 20-50 0 /hpf  
 RBC 0 0 /hpf Epithelial cells 0 0 /hpf Bacteria 4+ (H) 0 /hpf Casts 0 0 /lpf  
GLUCOSE, POC Collection Time: 09/01/18  7:41 AM  
Result Value Ref Range Glucose (POC) 330 (H) 65 - 100 mg/dL GLUCOSE, POC Collection Time: 09/01/18 11:33 AM  
Result Value Ref Range Glucose (POC) 331 (H) 65 - 100 mg/dL Assessment/ Plan:  
 
Principal Problem: 
  Difficulty with speech (9/1/2018) Active Problems: 
  GERD (gastroesophageal reflux disease) (1/7/2015) Hypothyroidism (1/7/2015) Pure hypercholesterolemia (2/9/2016) Essential hypertension (2/9/2016) Type 2 diabetes mellitus without complication, with long-term current use of insulin ([address] (4/6/2017) Acute UTI (urinary tract infection) (7/9/2018) CVA (cerebral vascular accident) ([address] (7/9/2018) No acute changes on MRI. Patient has hx of bells palsy making speech difficult. Speech therapy has seen and has recommended outpatient speech therapy. Speech hard to asess due to patient's fatigue. Will reassess tomorrow. TSH normal. Continue ASA, plavix, and statin. Glucose uncontrolled. Offered diabetic education but patient refuses.  Add Lantus today. Urine culture pending. DVT prophylaxis - lovenox Signed By: Minal Hilario DO September 1, 2018

## 2018-09-01 NOTE — PROGRESS NOTES
Pt POC urine +4 bacteria. Notified Dr. Christian Rodriguez, Rocephin and culture ordered. ASA 81 mg chewable not given as order cabinet override per ED and is a linked order.  mg given in ED.

## 2018-09-01 NOTE — ED TRIAGE NOTES
Double vision x 30 minutes prior to arrival and unsteady gait. States \"I think I'm having a stroke again\".

## 2018-09-01 NOTE — PROGRESS NOTES
Problem: Mobility Impaired (Adult and Pediatric) Goal: *Acute Goals and Plan of Care (Insert Text) Discharge Goals: 
(1.)Ms. Huang will ascend/descend 2 steps with use of railing with SUPERVISION within 7 treatment day(s). (2.)Ms. Kofi Madrigal will transfer from bed to chair and chair to bed with INDEPENDENT using the least restrictive device within 7 treatment day(s). (3.)Ms. Huang will ambulate with INDEPENDENT for 500+ feet with the least restrictive device within 7 treatment day(s). ________________________________________________________________________________________________ PHYSICAL THERAPY: Initial Assessment, Treatment Day: Day of Assessment, AM 9/1/2018 INPATIENT: Hospital Day: 2 Payor: Melissa Easley / Plan: [address] [place] [address] HMO / Product Type: [employer] Medicare /  
  
NAME/AGE/GENDER: Mendy Burnett is a 80 y.o. female PRIMARY DIAGNOSIS: Acute CVA (cerebrovascular accident) ([address] Acute CVA (cerebrovascular accident) [address] [facility] Acute CVA (cerebrovascular accident) ([facility] [facility] ICD-10: Treatment Diagnosis:  
 · Generalized Muscle Weakness (M62.81) · Difficulty in walking, Not elsewhere classified (R26.2) Precaution/Allergies: 
Codeine and Tramadol ASSESSMENT:  
 
Ms. Kofi Madrigal is supine in bed upon contact and agreeable to PT evaluation and treatment this morning. Pt is A&O X 4 with reports of 0/10 pain prior to mobility. Pt lives with mobile home with her  and 2 children with 2 steps to enter. Pt reports independence with gait and ADLs and 0 falls in past 6 months. Pt transitioned supine to sit EOB independently and performed STS with SBA. Pt ambulated 10 ft into bathroom with CGA-SBA with some unsteadiness noted. Pt seated on commode, voided, and performed toilet hygiene independently. Pt stood from commode and agreeable to further gait distance into hallway this session. Pt ambulated 250 ft in hallway with CGA-SBA.  Pt ambulates with compensations noted due to history of L hip fracture with pt reporting this being her baseline. Pt returned to room and supine in bed with all needs met and within reach. Pt is not interested in PT services following discharge from acute setting. Pt appears to be functioning close to her baseline and may benefit from 1-2 more sessions while in acute setting to maximize function and independence. This section established at most recent assessment PROBLEM LIST (Impairments causing functional limitations): 1. Decreased Strength 2. Decreased ADL/Functional Activities 3. Decreased Transfer Abilities 4. Decreased Ambulation Ability/Technique 5. Decreased Balance 6. Decreased Activity Tolerance 7. Decreased Pacing Skills 8. Increased Fatigue 9. Decreased [place] with Home Exercise Program 
 INTERVENTIONS PLANNED: (Benefits and precautions of physical therapy have been discussed with the patient.) 1. Balance Exercise 2. Bed Mobility 3. Family Education 4. Gait Training 5. Home Exercise Program (HEP) 6. Neuromuscular Re-education/Strengthening 7. Therapeutic Activites 8. Therapeutic Exercise/Strengthening 9. Transfer Training TREATMENT PLAN: Frequency/Duration: 3 times a week for 1 week Rehabilitation Potential For Stated Goals: Good RECOMMENDED REHABILITATION/EQUIPMENT: (at time of discharge pending progress): Due to the probability of continued deficits (see above) this patient will not likely need continued skilled physical therapy after discharge. Equipment:  
? Walkers, Type: Rolling BareedEE Garter HISTORY:  
History of Present Injury/Illness (Reason for Referral): 
See H&P below 79 y/o female with hx HTN, DM, HLD, hypothyroidism was recently hospitalized 7/9-7/11 for acute or subacute small right pontine infarct. She was discharged on asa and appropriate medications for listed medical problems.  Tonight while she was watching TV she developed acute double vision and unsteady gait. She was dizzy. She checked her glucose and was 300. She thinks because she had eaten donuts earlier. She thought she was having another stroke so came to ED where a code stroke called. STAT head CT was negative. Teleneurology utilized who felt she may be having new CVA symptoms but was not a TPA candidate due to recent CVA. Stat CTA head and neck negative according to preliminary report. Will re-admit for possible new CVA. Aspirin was administered in ED. Also noted is a UTI however she is asymptomatic from this. Past Medical History/Comorbidities: Ms. Jey Gutierrez  has a past medical history of Chronic musculoskeletal pain; CVA (cerebral vascular accident) ([facility] [address] [facility] (07/2018); Cystocele with rectocele; Essential hypertension (2/9/2016); GERD (gastroesophageal reflux disease); Hip fracture requiring operative repair ([facility] (1/9/2015); Hip fracture, left ([facility] [address] [facility] (1/7/2015); Hyperlipidemia; Hypothyroidism; Impaired mobility and ADLs; Pure hypercholesterolemia (2/9/2016); and Type 2 diabetes mellitus without complication ([facility] [address] [facility] (5/5/9002). Ms. Jey Gutierrez  has a past surgical history that includes hx tonsillectomy; hx tubal ligation; hx dilation and curettage; hx colonoscopy; hx magali and bso; and hx hip fracture tx (Left). Social History/Living Environment:  
Home Environment: Trailer/mobile home # Steps to Enter: 2 One/Two Story Residence: One story Living Alone: No 
Support Systems: Child(trey), Spouse/Significant Other/Partner Patient Expects to be Discharged to[de-identified] Trailer/mobile home Prior Level of Function/Work/Activity: 
Lives with family, indep with all mobility, 0 falls Number of Personal Factors/Comorbidities that affect the Plan of Care: 3+: HIGH COMPLEXITY EXAMINATION:  
Most Recent Physical Functioning:  
Gross Assessment: 
AROM: Generally decreased, functional 
Strength: Generally decreased, functional 
Coordination: Generally decreased, functional 
         
  
Posture: 
  
Balance: Sitting: Intact; Without support Standing: Intact; Without support Bed Mobility: 
Supine to Sit: Independent Sit to Supine: Independent Wheelchair Mobility: 
  
Transfers: 
Sit to Stand: Stand-by assistance Stand to Sit: Stand-by assistance Gait: 
  
Base of Support: Narrowed Speed/Rocio: Slow;Shuffled Step Length: Left shortened;Right shortened Gait Abnormalities: Decreased step clearance;Shuffling gait Distance (ft): 250 Feet (ft) Assistive Device:  (none) Ambulation - Level of Assistance: Contact guard assistance;Stand-by assistance Interventions: Safety awareness training;Verbal cues Body Structures Involved: 1. Nerves 2. Heart 3. Lungs 4. Bones 5. Muscles Body Functions Affected: 1. Cardio 2. Respiratory 3. Neuromusculoskeletal 
4. Movement Related Activities and Participation Affected: 1. General Tasks and Demands 2. Mobility 3. Self Care 4. Domestic Life 5. Interpersonal Interactions and Relationships 6. Community, Social and [place] Number of elements that affect the Plan of Care: 4+: HIGH COMPLEXITY CLINICAL PRESENTATION:  
Presentation: Stable and uncomplicated: LOW COMPLEXITY CLINICAL DECISION MAKING:  
[facility] 6 Clicks Basic Mobility Inpatient Short Form How much difficulty does the patient currently have. .. Unable A Lot A Little None 1. Turning over in bed (including adjusting bedclothes, sheets and blankets)? [] 1   [] 2   [] 3   [x] 4  
2. Sitting down on and standing up from a chair with arms ( e.g., wheelchair, bedside commode, etc.)   [] 1   [] 2   [] 3   [x] 4  
3. Moving from lying on back to sitting on the side of the bed? [] 1   [] 2   [] 3   [x] 4 How much help from another person does the patient currently need. .. Total A Lot A Little None 4. Moving to and from a bed to a chair (including a wheelchair)? [] 1   [] 2   [] 3   [x] 4  
5. Need to walk in hospital room?    [] 1   [] 2   [x] 3   [] 4  
 6.  Climbing 3-5 steps with a railing? [] 1   [] 2   [x] 3   [] 4  
© 2007, Trustees of [facility] [employer], under license to [employer]. All rights reserved Score:  Initial: 22 Most Recent: X (Date: -- ) Interpretation of Tool:  Represents activities that are increasingly more difficult (i.e. Bed mobility, Transfers, Gait). Score 24 23 22-20 19-15 14-10 9-7 6 Modifier CH CI CJ CK CL CM CN   
 
? Mobility - Walking and Moving Around:  
  - CURRENT STATUS: CJ - 20%-39% impaired, limited or restricted  - GOAL STATUS: CI - 1%-19% impaired, limited or restricted  - D/C STATUS:  ---------------To be determined--------------- Payor: Breanna Mijares / Plan: [address] [place] [address] HMO / Product Type: Managed Care Medicare /   
 
Medical Necessity:    
· Patient is expected to demonstrate progress in strength, balance, coordination and functional technique to decrease assistance required with gait, transfers, and functional mobility. Wilver Betancur Reason for Services/Other Comments: 
· Patient continues to require skilled intervention due to maximize function and independence while in acute setting. .  
Use of outcome tool(s) and clinical judgement create a POC that gives a: Clear prediction of patient's progress: LOW COMPLEXITY  
  
 
 
 
TREATMENT:  
(In addition to Assessment/Re-Assessment sessions the following treatments were rendered) Pre-treatment Symptoms/Complaints:  None \"Im feeling better\" Pain: Initial:  
Pain Intensity 1: 0  Post Session:  0/10 In addition to evaluation: 
Therapeutic Activity: (    8 minutes): Therapeutic activities including Ambulation on level ground and pacing techniques, cues for safety of transfers to improve mobility, strength, balance and coordination. Required minimal Safety awareness training;Verbal cues to promote dynamic balance in standing. Braces/Orthotics/Lines/Etc:  
· IV 
· O2 Device: Room air Treatment/Session Assessment: · Response to Treatment:  Ambulated 250 ft with CGA-SBA, close to baseline. · Interdisciplinary Collaboration:  
o Physical Therapist 
o Registered Nurse · After treatment position/precautions:  
o Supine in bed 
o Bed/Chair-wheels locked 
o Bed in low position 
o Call light within reach 
o transport at bedside · Compliance with Program/Exercises: Will assess as treatment progresses. · Recommendations/Intent for next treatment session: \"Next visit will focus on advancements to more challenging activities and reduction in assistance provided\". Total Treatment Duration: PT Patient Time In/Time Out Time In: 0623 Time Out: [address]

## 2018-09-01 NOTE — PROGRESS NOTES
Ischemic Stroke without Activase/TIA   
VTE Prophylaxis: Yes: lovenox 
  
Antiplatelet: Yes: ASA 
  
Statin if LDL Greater Than or Equal to100: Yes: lipitor 
  
BP Parameters: Less Than 220/120 for 24 hours, then consult MD for parameters 
  
Controlled With: Scheduled PO 
  
Dysphagia Screen Completed: Yes: Pass 
  
Patient has PEG, NG Tube, Feeding Tube: No 
  
Medication orders per above route: No - Call MD; Consider consult to pharmacy 
  
Nutrition Status: PO 
  
NIH Stroke Scale Complete: Yes: 4 
  
Frequency of Vital Signs: Every 4 hours 
   
Frequency of Neuro Checks: Every 4 hours 
  
Daily Education/Care Plan Updated:  Yes

## 2018-09-01 NOTE — PROGRESS NOTES
09/01/18 1269 Dual Skin Pressure Injury Assessment Dual Skin Pressure Injury Assessment WDL Second Care Provider (Based on [address] [place] [address]) Mis ISABEL Skin Integumentary Skin Integumentary (WDL) WDL

## 2018-09-01 NOTE — PROGRESS NOTES
TRANSFER - IN REPORT: 
 
Verbal report received from Fadumo RN(name) on Glorine Rosa  being received from ED(unit) for routine progression of care Report consisted of patients Situation, Background, Assessment and  
Recommendations(SBAR). Information from the following report(s) SBAR, kardex, was reviewed with the receiving nurse. Opportunity for questions and clarification was provided. Assessment completed upon patients arrival to unit and care assumed.

## 2018-09-01 NOTE — ROUTINE PROCESS
TRANSFER - OUT REPORT: 
 
Verbal report given to Krys Contreras (name) on Ranchester Lev  being transferred to 5 (unit) for routine progression of care Report consisted of patients Situation, Background, Assessment and  
Recommendations(SBAR). Information from the following report(s) ED Summary was reviewed with the receiving nurse. Opportunity for questions and clarification was provided. Patient transported with: 
 [employer]

## 2018-09-01 NOTE — PROGRESS NOTES
Problem: Dysphagia (Adult) Goal: *Acute Goals and Plan of Care (Insert Text) Goals None Speech language pathology: bedside swallow note: Initial Assessment and Discharge NAME/AGE/GENDER: Benjamin Hensley is a 80 y.o. female DATE: 9/1/2018 PRIMARY DIAGNOSIS: Acute CVA (cerebrovascular accident) ([facility] ICD-10: Treatment Diagnosis: R13.11 Dysphagia, Oral Phase INTERDISCIPLINARY COLLABORATION: Registered Nurse PRECAUTIONS/ALLERGIES: Codeine and Tramadol ASSESSMENT:Based on the objective data described below, Ms. Marisol Godoy presents with mild dysarthria at baseline (per patient report due to history of bell's palsy) and mild oral delay secondary to decreased rear dentition. Tolerated any/all consistencies presented without overt s/sx. Mildly increased mastication time with solids given lack of rear molars, but adequate for consumption. No oral residue noted. Recommend continue regular diet/thin liquids at this time. No further acute skilled speech therapy services indicated at this time. ?????? ? ? This section established at most recent assessment?????????? 
PROBLEM LIST (Impairments causing functional limitations): 1. Baseline mild dysarthria 2. Mildly increased mastication time due to decreased dentition PLAN OF CARE:  
Patient will benefit from skilled intervention to address the following impairments. RECOMMENDATIONS AND PLANNED INTERVENTIONS (Benefits and precautions of therapy have been discussed with the patient.): 
· continue prescribed diet MEDICATIONS: 
· With liquid COMPENSATORY STRATEGIES/MODIFICATIONS INCLUDING: 
· None OTHER RECOMMENDATIONS (including follow up treatment recommendations):  
· Patient education RECOMMENDED DIET MODIFICATIONS DISCUSSED WITH: 
· Patient FREQUENCY/DURATION: DiscontinueRECOMMENDED REHABILITATION/EQUIPMENT: (at time of discharge pending progress): Due to the probability of continued deficits (see above) this patient will not likely need continued skilled speech therapy after discharge. SUBJECTIVE:  
\"I thought I was having another stroke\" History of Present Injury/Illness: Ms. Lamarr Schaumann  has a past medical history of Chronic musculoskeletal pain; CVA (cerebral vascular accident) ([facility] (07/2018); Cystocele with rectocele; Essential hypertension (2/9/2016); GERD (gastroesophageal reflux disease); Hip fracture requiring operative repair ([facility] (1/9/2015); Hip fracture, left ([facility] (1/7/2015); Hyperlipidemia; Hypothyroidism; Impaired mobility and ADLs; Pure hypercholesterolemia (2/9/2016); and Type 2 diabetes mellitus without complication ([facility] (6/1/2122). .  She also  has a past surgical history that includes hx tonsillectomy; hx tubal ligation; hx dilation and curettage; hx colonoscopy; hx magali and bso; and hx hip fracture tx (Left). Present Symptoms:   
Pain Intensity 1: 0 Current Medications: No current facility-administered medications on file prior to encounter. Current Outpatient Prescriptions on File Prior to Encounter Medication Sig Dispense Refill  B.infantis-B.ani-B.long-B.bifi (PROBIOTIC 4X) 10-15 mg TbEC Take  by mouth.  aspirin delayed-release 81 mg tablet Take 1 Tab by mouth daily. 30 Tab 0  
 vit a,c & e-lutein-minerals (VISION FORMULA, WITH LUTEIN,) tablet Take 1 Tab by mouth daily.  magnesium 250 mg tab Take 250 mg by mouth daily.  Alpha Lipoic Acid 600 mg cap Take 200 mg by mouth daily.  solifenacin (VESICARE) 10 mg tablet Take 10 mg by mouth daily.  gabapentin (NEURONTIN) 600 mg tablet TAKE ONE TABLET BY MOUTH THREE TIMES DAILY 270 Tab 3  
 ONETOUCH ULTRA BLUE TEST STRIP strip USE TO TEST BLOOD SUGARS THREE TIMES A  Strip 11  TURMERIC, BULK, by Does Not Apply route.  HUMALOG KWIKPEN 100 unit/mL kwikpen INJECT 40  UNITS SUBCUTANEOUSLY AT BEDTIME 15 Pen 11  
 ezetimibe (ZETIA) 10 mg tablet Take 1 Tab by mouth daily.  90 Tab 3  
  cyclobenzaprine (FLEXERIL) 10 mg tablet Take 1 Tab by mouth daily. 90 Tab 3  
 traMADol (ULTRAM) 50 mg tablet TAKE ONE TABLET BY MOUTH TWICE DAILY AS NEEDED FOR 30 DAYS (Patient taking differently: TAKE ONE TABLET BY MOUTH TWICE DAILY AS NEEDED F  Indications: Pain, NEUROPATHIC PAIN) 60 Tab 5  BD INSULIN PEN NEEDLE UF SHORT 31 gauge x 5/16\" ndle USE  TWICE DAILY AS DIRECTED WITH  INSULIN  PENS 100 Pen Needle 17  
 TOUJEO SOLOSTAR 300 unit/mL (1.5 mL) inpn INJECT 70  UNITS SUBCUTANEOUSLY ONCE DAILY WITH DINNER (Patient taking differently: 25 units AM & 50 units PM) 18 Pen 11  
 glyBURIDE-metFORMIN (GLUCOVANCE) 5-500 mg per tablet Take 1 Tab by mouth Daily (before breakfast). Indications: type 2 diabetes mellitus 90 Tab 3  
 atorvastatin (LIPITOR) 80 mg tablet Take 1 Tab by mouth daily. Indications: hyperlipidemia 90 Tab 3  
 oxybutynin chloride XL (DITROPAN XL) 10 mg CR tablet Take 1 Tab by mouth daily. 30 Tab 11  
 meloxicam (MOBIC) 15 mg tablet Take 1 Tab by mouth daily. 30 Tab 11  
 cyanocobalamin 1,000 mcg tablet Take 1,000 mcg by mouth daily.  levothyroxine (SYNTHROID) 100 mcg tablet TAKE ONE TABLET BY MOUTH ONCE DAILY BEFORE BREAKFAST FOR HYPOTHYROIDISM 90 Tab 3  
 ONETOUCH ULTRA TEST strip TEST BLOOD SUGAR TWICE A    Strip 2  
 CALCIUM CARB/VIT D3/MINERALS (CALCIUM-VITAMIN D PO) Take  by mouth.  MULTIVITAMIN PO Take  by mouth. Current Dietary Status:   
 Regular/thin History of reflux:  NO  
? Reflux medication:n/a Social History/Home Situation: home with  OBJECTIVE:  
Respiratory Status:  Room air CXR Results:n/a 
MRI pending CT Results:no acute intracranial abnormality. Oral Motor Structure/Speech:  Oral-Motor Structure/Motor Speech Labial: Left droop (slight - baseline from bells palsy \"years ago\") Dentition: Natural (missing rear) Oral Hygiene: good Lingual: No impairment Cognitive and Communication Status: 
Neurologic State: Alert Orientation Level: Oriented X4 Cognition: Follows commands Perception: Appears intact Perseveration: No perseveration noted Safety/Judgement: Awareness of environment BEDSIDE SWALLOW EVALUATION Oral Assessment: 
Oral Assessment Labial: Left droop (slight - baseline from bells palsy \"years ago\") Dentition: Natural (missing rear) Oral Hygiene: good Lingual: No impairment P.O. Trials: 
Patient Position: upright in bed The patient was given bite/sip amounts of the following:  
Consistency Presented: Thin liquid; Solid;Puree; Ice chips;Mixed consistency How Presented: Self-fed/presented;SLP-fed/presented;Cup/sip;Spoon;Straw;Successive swallows ORAL PHASE: 
Bolus Acceptance: No impairment Bolus Formation/Control: Impaired Propulsion: No impairment Type of Impairment: Delayed;Mastication Oral Residue: None PHARYNGEAL PHASE: 
Initiation of Swallow: No impairment Laryngeal Elevation: Functional 
Aspiration Signs/Symptoms: None Vocal Quality: No impairment Pharyngeal Phase Characteristics: No impairment, issues, or problems OTHER OBSERVATIONS: 
Rate/bite size: WNL Endurance: WNL Comments: n/a Tool Used: Dysphagia Outcome and Severity Scale (MEHREEN) Score Comments Normal Diet  [] 7 With no strategies or extra time needed Functional Swallow  [x] 6 May have mild oral or pharyngeal delay Mild Dysphagia 
  [] 5 Which may require one diet consistency restricted (those who demonstrate penetration which is entirely cleared on MBS would be included) Mild-Moderate Dysphagia  [] 4 With 1-2 diet consistencies restricted Moderate Dysphagia  [] 3 With 2 or more diet consistencies restricted Moderately Severe Dysphagia  [] 2 With partial PO strategies (trials with ST only) Severe Dysphagia  [] 1 With inability to tolerate any PO safely Score:  Initial: 6 Most Recent: X (Date: -- ) Interpretation of Tool: The Dysphagia Outcome and Severity Scale (MEHREEN) is a simple, easy-to-use, 7-point scale developed to systematically rate the functional severity of dysphagia based on objective assessment and make recommendations for diet level, independence level, and type of nutrition. Score 7 6 5 4 3 2 1 Modifier CH CI CJ CK CL CM CN ? Swallowing:  
  - CURRENT STATUS: CI - 1%-19% impaired, limited or restricted  - GOAL STATUS:  CI - 1%-19% impaired, limited or restricted  - D/C STATUS:  CI - 1%-19% impaired, limited or restricted Payor: [employer] / Plan: [address] [place] [address] HMO / Product Type: Managed Care Medicare /  
 
TREATMENT:  
 (In addition to Assessment/Re-Assessment sessions the following treatments were rendered) Assessment/Reassessment only, no treatment provided today MODALITIES:  
  
  
  
  
  
  
  
  
  
  
    
  
  
  
  
  
  
  
  
   
 
ORAL MOTOR  EXERCISES: 
  
  
  
  
  
  
  
  
  
  
  
  
  
  
  
  
  
  
  
  
  
  
  
  
  
  
    
  
  
  
  
  
  
  
  
  
  
  
  
  
  
  
  
  
  
  
  
  
  
  
  
  
   
 
LARYNGEAL / PHARYNGEAL EXERCISES: 
  
  
  
  
  
  
  
  
  
  
  
  
  
  
  
  
  
  
  
  
  
    
  
  
  
  
  
  
  
  
  
  
  
  
  
  
  
  
  
  
  
   
 
__________________________________________________________________________________________________ Safety: After treatment position/precautions: 
· Call light within reach · Upright in Bed Treatment Assessment:  Patient actively participated in evaluation. Progression/Medical Necessity:  
· discontinue Compliance with Program/Exercises: compliant all of the time. Reason for Continuation of Services/Other Comments: 
· discontinue Recommendations/Intent for next treatment session: Discontinue Total Treatment Duration: 
Time In: 7363 Time Out: 2103 Mic Hansen.  MS Jay, CCC-SLP

## 2018-09-01 NOTE — PROGRESS NOTES
Problem: Self Care Deficits Care Plan (Adult) Goal: *Acute Goals and Plan of Care (Insert Text) 1. Patient will complete full body bathing and dressing with mod I and adaptive equipment as needed. 2. Patient will complete toileting with independently. 3. Patient will tolerate 25 minutes of OT treatment with less than 2 rest breaks to increase activity tolerance for ADLs. 4. Patient will complete functional transfers with mod I and adaptive equipment as needed. 5. Patient will complete grooming tasks in standing at sink level including gathering of supplies. 6. Patient will visually scan environment with minimal verbal cues from OT during ADL or treatment session to increase safety and awareness during all tasks and mobility. Timeframe: 7 visits OCCUPATIONAL THERAPY: Initial Assessment, Daily Note, Treatment Day: Day of Assessment and PM 9/1/2018 INPATIENT: Hospital Day: 2 Payor: Rachael Erickson / Plan: [address] [place] [address] HMO / Product Type: [employer] Medicare /  
  
NAME/AGE/GENDER: Yisel Fernandes is a 80 y.o. female PRIMARY DIAGNOSIS:  Acute CVA (cerebrovascular accident) ([address] Acute CVA (cerebrovascular accident) ([address] Acute CVA (cerebrovascular accident) ([address] ICD-10: Treatment Diagnosis:  
 · Generalized Muscle Weakness (M62.81) · Other lack of cordination (R27.8) · Difficulty in walking, Not elsewhere classified (R26.2) · Hemiplegia and hemiparesis following cerebral infarction affecting left non-dominant side (M57.409) Precautions/Allergies: 
  falls, impaired vision, Codeine and Tramadol ASSESSMENT:  
 
Ms. Tomer Marquez is 79 YO R dominant WF admitted with above. This afternoon, pt is supine in bed with head of bed elevated, napping, agreeable to OT evaluation and treatment. Spouse also at bedside and napping. Pt though groggy is A&O X 4, no report pain throughout session. Pt lives with spouse and 2 children in a 1 level mobile home, 2 steps to enter with no HR.  Pt was independent with ADLs, ambulation, no falls in past 6 months, reports minimal driving, no DME. Pt supine to sit EOB independently and performed sit to stand with SBA. Pt ambulated 10 ft into the bathroom with CGA-SBA with some unsteadiness noted. Pt seated on toilet with CGA, voided, and performed toilet hygiene independently, including pulling up underwear. Pt stood from toilet with CGA, completed handwashing at sink with SBA. Also completed hair brushing and dental care with a few verbal cues to scan for needed items. Reports vision has cleared up since admission. B UEs are WFLs for basic self care tasks, with some OA changes noted in pt's hands, but grasps about equal. Good Adryan and finger to nose as well as finger opposition. Pt has a limp when walking, but reports \"it's been like that a while. \"  Pt reports she sponged off earlier today but would like to get a shower tomorrow. Pt then returned to bed independently, all needs met and within reach. Pt is not interested in OT services following discharge from acute setting. Pt appears to be functioning close to her baseline and may benefit from 1-2 more sessions while in acute setting to maximize safety, function and independence with self care tasks. This section established at most recent assessment PROBLEM LIST (Impairments causing functional limitations): 1. Decreased Strength 2. Decreased ADL/Functional Activities 3. Decreased Ambulation Ability/Technique 4. Decreased Balance 5. Decreased Activity Tolerance 6. Decreased vision INTERVENTIONS PLANNED: (Benefits and precautions of occupational therapy have been discussed with the patient.) 1. Activities of daily living training 2. Adaptive equipment training 3. Balance training 4. Therapeutic activity 5. Therapeutic exercise TREATMENT PLAN: Frequency/Duration: Follow patient 3x per week to address above goals. Rehabilitation Potential For Stated Goals: Good RECOMMENDED REHABILITATION/EQUIPMENT: (at time of discharge pending progress): Due to the probability of continued deficits (see above) this patient will not likely need continued skilled occupational therapy after discharge. Equipment: ? TBD based on progress OCCUPATIONAL PROFILE AND HISTORY:  
History of Present Injury/Illness (Reason for Referral): 
See H & P Past Medical History/Comorbidities: Ms. Mouna Dill  has a past medical history of Chronic musculoskeletal pain; CVA (cerebral vascular accident) ([facility] (07/2018); Cystocele with rectocele; Essential hypertension (2/9/2016); GERD (gastroesophageal reflux disease); Hip fracture requiring operative repair ([facility] (1/9/2015); Hip fracture, left ([facility] (1/7/2015); Hyperlipidemia; Hypothyroidism; Impaired mobility and ADLs; Pure hypercholesterolemia (2/9/2016); and Type 2 diabetes mellitus without complication ([facility] (0/5/2381). Ms. Mouna Dill  has a past surgical history that includes hx tonsillectomy; hx tubal ligation; hx dilation and curettage; hx colonoscopy; hx magali and bso; and hx hip fracture tx (Left). Social History/Living Environment:  
Home Environment: Trailer/mobile home # Steps to Enter: 2 One/Two Story Residence: One story Living Alone: No 
Support Systems: Child(trey), Spouse/Significant Other/Partner Patient Expects to be Discharged to[de-identified] Trailer/mobile home Current DME Used/Available at Home: None Tub or Shower Type: Tub/Shower combination Prior Level of Function/Work/Activity: 
Pt lives with spouse and 2 children in a 1 level mobile home, 2 steps to enter with no HR. Pt was independent with ADLs, ambulation, no falls in past 6 months, reports minimal driving, no DME. Dominant Side:  
      RIGHT Number of Personal Factors/Comorbidities that affect the Plan of Care: Extensive review of physical, cognitive, and psychosocial performance (3+):  HIGH COMPLEXITY ASSESSMENT OF OCCUPATIONAL PERFORMANCE[de-identified]  
 Activities of Daily Living:  
Basic ADLs (From Assessment) Complex ADLs (From Assessment) Feeding: Setup Oral Facial Hygiene/Grooming: Setup, Additional time Bathing: Minimum assistance Upper Body Dressing: Minimum assistance Lower Body Dressing: Minimum assistance Toileting: Minimum assistance Grooming/Bathing/Dressing Activities of Daily Living Cognitive Retraining Safety/Judgement: Awareness of environment; Fall prevention Functional Transfers Toilet Transfer : Contact guard assistance Shower Transfer: Contact guard assistance Bed/Mat Mobility Rolling: Independent Supine to Sit: Independent Sit to Supine: Independent Sit to Stand: Stand-by assistance Bed to Chair: Stand-by assistance Scooting: Independent Most Recent Physical Functioning:  
Gross Assessment: 
AROM: Generally decreased, functional 
Strength: Generally decreased, functional 
Coordination: Generally decreased, functional 
         
  
Posture: 
  
Balance: 
Sitting: Intact; Without support Standing: Intact; Without support Bed Mobility: 
Rolling: Independent Supine to Sit: Independent Sit to Supine: Independent Scooting: Independent Wheelchair Mobility: 
  
Transfers: 
Sit to Stand: Stand-by assistance Stand to Sit: Supervision Bed to Chair: Stand-by assistance Patient Vitals for the past 6 hrs: 
 BP BP Patient Position SpO2  
09/01/18 1130 142/78 At rest 94 % Mental Status Neurologic State: Drowsy Orientation Level: Oriented X4 Cognition: Appropriate for age attention/concentration, Follows commands, Appropriate safety awareness, Appropriate decision making Perception: Appears intact Perseveration: No perseveration noted Safety/Judgement: Awareness of environment, Fall prevention Physical Skills Involved: 
1. Balance 2. Strength 3. Activity Tolerance 4.  Vision Cognitive Skills Affected (resulting in the inability to perform in a timely and safe manner): 1. Perception 2. Sustained Attention Psychosocial Skills Affected: 1. Habits/Routines 2. Environmental Adaptation 3. Social Interaction 4. Emotional Regulation 5. Self-Awareness 6. Awareness of Others 7. Social Roles Number of elements that affect the Plan of Care: 5+:  HIGH COMPLEXITY CLINICAL DECISION MAKING:  
[facility] AM-PAC 6 Clicks Daily Activity Inpatient Short Form How much help from another person does the patient currently need. .. Total A Lot A Little None 1. Putting on and taking off regular lower body clothing? [] 1   [] 2   [x] 3   [] 4  
2. Bathing (including washing, rinsing, drying)? [] 1   [] 2   [x] 3   [] 4  
3. Toileting, which includes using toilet, bedpan or urinal?   [] 1   [] 2   [x] 3   [] 4  
4. Putting on and taking off regular upper body clothing? [] 1   [] 2   [x] 3   [] 4  
5. Taking care of personal grooming such as brushing teeth? [] 1   [] 2   [x] 3   [] 4  
6. Eating meals? [] 1   [] 2   [x] 3   [] 4  
© 2007, Trustees of [facility], under license to [employer]. All rights reserved Score:  Initial: 18, completed 9/1/2018 Most Recent: X (Date: -- ) Interpretation of Tool:  Represents activities that are increasingly more difficult (i.e. Bed mobility, Transfers, Gait). Score 24 23 22-20 19-15 14-10 9-7 6 Modifier CH CI CJ CK CL CM CN   
 
? Self Care:  
  - CURRENT STATUS: CK - 40%-59% impaired, limited or restricted  - GOAL STATUS: CI - 1%-19% impaired, limited or restricted  - D/C STATUS:  ---------------To be determined--------------- Payor: Arlander Mixer / Plan: [address] [place] [address] HMO / Product Type: Managed Care Medicare /   
 
Medical Necessity:    
· Patient demonstrates good rehab potential due to higher previous functional level.  
Reason for Services/Other Comments: 
· Patient continues to require skilled intervention due to s/p above and decreased ADLs and mobility. Use of outcome tool(s) and clinical judgement create a POC that gives a: MODERATE COMPLEXITY  
 
 
 
TREATMENT:  
(In addition to Assessment/Re-Assessment sessions the following treatments were rendered) Pre-treatment Symptoms/Complaints:   
Pain: Initial:  
Pain Intensity 1: 0  Post Session:  unchanged Self Care: (15 mins): Procedure(s) (per grid) utilized to improve and/or restore self-care/home management as related to dressing, toileting, grooming and self feeding. Required minimal visual, verbal and   cueing to facilitate activities of daily living skills and compensatory activities. Educated on need to visually scan environment for safety concerns. Evaluation: 15 mins Braces/Orthotics/Lines/Etc:  
· O2 Device: Room air Treatment/Session Assessment:   
· Response to Treatment:  tolerated well, sleepy from being up late last night in the ER 
· Interdisciplinary Collaboration:  
o Occupational Therapist 
o Registered Nurse · After treatment position/precautions:  
o Supine in bed 
o Bed/Chair-wheels locked 
o Bed in low position 
o Call light within reach 
o RN notified 
o Family at bedside 
o Side rails x 2  
· Compliance with Program/Exercises: compliant most of the time. · Recommendations/Intent for next treatment session: \"Next visit will focus on advancements to more challenging activities and reduction in assistance provided\". Total Treatment Duration: 30 mins OT Patient Time In/Time Out Time In: 1300 Time Out: 1330 JAYME Palumbo, MS, OTR/L

## 2018-09-01 NOTE — H&P
Hospitalist H&P/Consult Note Admit Date:  2018 11:42 PM  
Name:  Manoj Salazar Age:  80 y.o. 
:  1950 MRN:  [id number] PCP:  Yudith Leigh MD 
Treatment Team: Attending Provider: Latosha Stallings MD 
 
HPI:  
81 y/o female with hx HTN, DM, HLD, hypothyroidism was recently hospitalized - for acute or subacute small right pontine infarct. She was discharged on asa and appropriate medications for listed medical problems. Tonight while she was watching TV she developed acute double vision and unsteady gait. She was dizzy. She checked her glucose and was 300. She thinks because she had eaten donuts earlier. She thought she was having another stroke so came to ED where a code stroke called. STAT head CT was negative. Teleneurology utilized who felt she may be having new CVA symptoms but was not a TPA candidate due to recent CVA. Stat CTA head and neck negative according to preliminary report. Will re-admit for possible new CVA. Aspirin was administered in ED. Also noted is a UTI however she is asymptomatic from this. 10 systems reviewed and negative except as noted in HPI. Past Medical History:  
Diagnosis Date  Chronic musculoskeletal pain  CVA (cerebral vascular accident) ([address] 2018  
 right pontine  Cystocele with rectocele H/O  
 Essential hypertension 2016  GERD (gastroesophageal reflux disease) diet controlled  Hip fracture requiring operative repair ([address] 2015 H/O LFT HIP REPAIR  
 Hip fracture, left ([address] 2015  Hyperlipidemia  Hypothyroidism  Impaired mobility and ADLs  Pure hypercholesterolemia 2016  Type 2 diabetes mellitus without complication ([address] 5302 Past Surgical History:  
Procedure Laterality Date  HX COLONOSCOPY    
 HX DILATION AND CURETTAGE    
 HX HIP FRACTURE TX Left  HX MT AND BSO  HX TONSILLECTOMY  HX TUBAL LIGATION Prior to Admission Medications Prescriptions Last Dose Informant Patient Reported? Taking? Alpha Lipoic Acid 600 mg cap   Yes No  
Sig: Take 200 mg by mouth daily. B.infantis-B.ani-B.long-B.bifi (PROBIOTIC 4X) 10-15 mg TbEC   Yes No  
Sig: Take  by mouth. BD INSULIN PEN NEEDLE UF SHORT 31 gauge x 5/16\" ndle   No No  
Sig: USE  TWICE DAILY AS DIRECTED WITH  INSULIN  PENS  
CALCIUM CARB/VIT D3/MINERALS (CALCIUM-VITAMIN D PO)   Yes No  
Sig: Take  by mouth. HUMALOG KWIKPEN 100 unit/mL kwikpen   No No  
Sig: INJECT 40  UNITS SUBCUTANEOUSLY AT BEDTIME MULTIVITAMIN PO   Yes No  
Sig: Take  by mouth. ONETOUCH ULTRA BLUE TEST STRIP strip   No No  
Sig: USE TO TEST BLOOD SUGARS THREE TIMES A DAY  
ONETOUCH ULTRA TEST strip   No No  
Sig: TEST BLOOD SUGAR TWICE A   DAY  
TOUJEO SOLOSTAR 300 unit/mL (1.5 mL) inpn   No No  
Sig: INJECT 70  UNITS SUBCUTANEOUSLY ONCE DAILY WITH DINNER Patient taking differently: 25 units AM & 50 units PM  
TURMERIC, BULK,   Yes No  
Sig: by Does Not Apply route. aspirin delayed-release 81 mg tablet   No No  
Sig: Take 1 Tab by mouth daily. atorvastatin (LIPITOR) 80 mg tablet   No No  
Sig: Take 1 Tab by mouth daily. Indications: hyperlipidemia  
cyanocobalamin 1,000 mcg tablet   Yes No  
Sig: Take 1,000 mcg by mouth daily. cyclobenzaprine (FLEXERIL) 10 mg tablet   No No  
Sig: Take 1 Tab by mouth daily. ezetimibe (ZETIA) 10 mg tablet   No No  
Sig: Take 1 Tab by mouth daily. gabapentin (NEURONTIN) 600 mg tablet   No No  
Sig: TAKE ONE TABLET BY MOUTH THREE TIMES DAILY  
glyBURIDE-metFORMIN (GLUCOVANCE) 5-500 mg per tablet   No No  
Sig: Take 1 Tab by mouth Daily (before breakfast). Indications: type 2 diabetes mellitus  
levothyroxine (SYNTHROID) 100 mcg tablet   No No  
Sig: TAKE ONE TABLET BY MOUTH ONCE DAILY BEFORE BREAKFAST FOR HYPOTHYROIDISM  
magnesium 250 mg tab   Yes No  
Sig: Take 250 mg by mouth daily. meloxicam (MOBIC) 15 mg tablet   No No  
Sig: Take 1 Tab by mouth daily. oxybutynin chloride XL (DITROPAN XL) 10 mg CR tablet   No No  
Sig: Take 1 Tab by mouth daily. solifenacin (VESICARE) 10 mg tablet   Yes No  
Sig: Take 10 mg by mouth daily. traMADol (ULTRAM) 50 mg tablet   No No  
Sig: TAKE ONE TABLET BY MOUTH TWICE DAILY AS NEEDED FOR 30 DAYS Patient taking differently: TAKE ONE TABLET BY MOUTH TWICE DAILY AS NEEDED F  Indications: Pain, NEUROPATHIC PAIN  
vit a,c & e-lutein-minerals (VISION FORMULA, WITH LUTEIN,) tablet   Yes No  
Sig: Take 1 Tab by mouth daily. Facility-Administered Medications: None Allergies Allergen Reactions  Codeine Other (comments) lightheadedness  Tramadol Unknown (comments) HEADACHE, ONLY IF TAKEN AT NIGHT Social History Substance Use Topics  Smoking status: Never Smoker  Smokeless tobacco: Never Used  Alcohol use No  
  
Family History Problem Relation Age of Onset  Cancer Mother  Cancer Father  Breast Cancer Neg Hx Immunization History Administered Date(s) Administered  Influenza Vaccine 10/20/2014, 10/06/2017  Influenza Vaccine Candiss Lukes) 02/09/2016  Pneumococcal Vaccine (Unspecified Type) 11/13/2012  TB Skin Test (PPD) Intradermal 01/07/2015 Objective:  
Patient Vitals for the past 24 hrs: 
 Temp Pulse Resp BP SpO2  
09/01/18 0222 98 °F (36.7 °C) 86 17 131/70 93 % 09/01/18 0118 - (!) 103 - 144/70 96 % 09/01/18 0058 - 95 - 135/64 99 % 09/01/18 0056 - 96 - 139/67 98 % 09/01/18 0014 - 97 - 136/74 98 % 08/31/18 2307 97.9 °F (36.6 °C) 86 18 154/78 96 % Oxygen Therapy O2 Sat (%): 93 % (09/01/18 0222) Pulse via Oximetry: 86 beats per minute (09/01/18 0222) O2 Device: Room air (09/01/18 0222) No intake or output data in the 24 hours ending 09/01/18 0429 Physical Exam: 
General:    Well nourished. Alert. Mild dysarthria, residual effect from prior Bell's palsy. Wears corrective lenses. No aphasia . No nystagmus Eyes:   Normal sclera. Extraocular movements intact. ENT:  Normocephalic, atraumatic. Moist mucous membranes CV:   RRR. No murmur, rub, or gallop. Lungs:  CTAB. No wheezing, rhonchi, or rales. Abdomen: Soft, nontender, nondistended. Bowel sounds normal.  
Extremities: Warm and dry. No cyanosis or edema. Neurologic: CN II-XII grossly intact. Sensation intact. Poor finger to nose bilat. Unsteady on feet. Skin:     No rashes or jaundice. No wounds. Psych:  Normal mood and affect. I reviewed the labs, imaging, EKGs, telemetry, and other studies done this admission. Data Review:  
Recent Results (from the past 24 hour(s)) GLUCOSE, POC Collection Time: 08/31/18 11:21 PM  
Result Value Ref Range Glucose (POC) 270 (H) 65 - 100 mg/dL CBC WITH AUTOMATED DIFF Collection Time: 08/31/18 11:33 PM  
Result Value Ref Range WBC 5.2 4.3 - 11.1 K/uL  
 RBC 4.48 4.05 - 5.2 M/uL  
 HGB 11.9 11.7 - 15.4 g/dL HCT 37.4 35.8 - 46.3 % MCV 83.5 79.6 - 97.8 FL  
 MCH 26.6 26.1 - 32.9 PG  
 MCHC 31.8 31.4 - 35.0 g/dL  
 RDW 17.1 % PLATELET 306 (L) 752 - 450 K/uL MPV 11.7 9.4 - 12.3 FL ABSOLUTE NRBC 0.00 0.0 - 0.2 K/uL  
 DF AUTOMATED NEUTROPHILS 54 43 - 78 % LYMPHOCYTES 29 13 - 44 % MONOCYTES 11 4.0 - 12.0 % EOSINOPHILS 5 0.5 - 7.8 % BASOPHILS 1 0.0 - 2.0 % IMMATURE GRANULOCYTES 0 0.0 - 5.0 %  
 ABS. NEUTROPHILS 2.8 1.7 - 8.2 K/UL  
 ABS. LYMPHOCYTES 1.5 0.5 - 4.6 K/UL  
 ABS. MONOCYTES 0.6 0.1 - 1.3 K/UL  
 ABS. EOSINOPHILS 0.3 0.0 - 0.8 K/UL  
 ABS. BASOPHILS 0.0 0.0 - 0.2 K/UL  
 ABS. IMM. GRANS. 0.0 0.0 - 0.5 K/UL METABOLIC PANEL, COMPREHENSIVE Collection Time: 08/31/18 11:33 PM  
Result Value Ref Range Sodium 142 136 - 145 mmol/L Potassium 4.9 3.5 - 5.1 mmol/L Chloride 108 (H) 98 - 107 mmol/L  
 CO2 27 21 - 32 mmol/L Anion gap 7 7 - 16 mmol/L Glucose 276 (H) 65 - 100 mg/dL BUN 14 8 - 23 MG/DL  Creatinine 0.64 0.6 - 1.0 MG/DL  
 GFR est AA >60 >60 ml/min/1.73m2 GFR est non-AA >60 >60 ml/min/1.73m2 Calcium 10.0 8.3 - 10.4 MG/DL Bilirubin, total 1.0 0.2 - 1.1 MG/DL  
 ALT (SGPT) 78 (H) 12 - 65 U/L  
 AST (SGOT) 83 (H) 15 - 37 U/L Alk. phosphatase 140 (H) 50 - 136 U/L Protein, total 7.6 6.3 - 8.2 g/dL Albumin 3.2 3.2 - 4.6 g/dL Globulin 4.4 (H) 2.3 - 3.5 g/dL A-G Ratio 0.7 (L) 1.2 - 3.5 URINE MICROSCOPIC Collection Time: 09/01/18  1:38 AM  
Result Value Ref Range WBC 20-50 0 /hpf  
 RBC 0 0 /hpf Epithelial cells 0 0 /hpf Bacteria 4+ (H) 0 /hpf Casts 0 0 /lpf Imaging Studies: CXR Results  (Last 48 hours) None CT Results  (Last 48 hours) 08/31/18 2331  CT HEAD WO CONT Final result Impression:  IMPRESSION:  
   
No acute intracranial abnormalities. Date of Dictation: 8/31/2018 11:33 PM  
   
  
 Narrative:  CT HEAD WITHOUT CONTRAST HISTORY:  CVA. COMPARISON: MRI of the brain dated 7/9/2018 TECHNIQUE: Axial imaging was performed without intravenous contrast utilizing 5mm slice thickness. Sagittal and coronal reformats were performed. Radiation  
dose reduction techniques were used for this study. Our CT scanner uses one or  
all of the following: Automated exposure control, adjustment of the MAS or KUB according to patient's  
size and iterative reconstruction. FINDINGS:      
   
*BRAIN:   
   -  There are no early signs of territorial or lacunar infarction by CT.  
   -  No intracranial mass, hematoma, or hydrocephalus. -  For patient's age, the scattered areas of white matter hypodensities may  
represent a chronic small vessel white matter ischemia. However this is  
nonspecific. *VISUALIZED PARANASAL SINUSES: Well aerated. *MASTOIDS:  Clear. *CALVARIUM AND SCALP: Unremarkable. Assessment and Plan:  
 
Hospital Problems as of 9/1/2018  Date Reviewed: 6/12/2018 Codes Class Noted - Resolved POA * (Principal)Acute CVA (cerebrovascular accident) ([facility] ICD-10-CM: I63.9 ICD-9-CM: 434.91  9/1/2018 - Present Yes Acute UTI (urinary tract infection) ICD-10-CM: N39.0 ICD-9-CM: 599.0  7/9/2018 - Present Yes Essential hypertension ICD-10-CM: I10 
ICD-9-CM: 401.9  2/9/2016 - Present Yes Type 2 diabetes mellitus without complication, with long-term current use of insulin (HCC) ICD-10-CM: E11.9, Z79.4 ICD-9-CM: 250.00, V58.67  4/6/2017 - Present Yes Pure hypercholesterolemia ICD-10-CM: E78.00 ICD-9-CM: 272.0  2/9/2016 - Present Yes GERD (gastroesophageal reflux disease) ICD-10-CM: K21.9 ICD-9-CM: 530.81  1/7/2015 - Present Yes Hypothyroidism ICD-10-CM: E03.9 ICD-9-CM: 244.9  1/7/2015 - Present Yes PLAN: 
· Admit patient as inpatient to remote telemetry · As she is having stroke-like symptoms on asa therapy will add plavix · Preliminary CTA head and neck negative · Obtain MRI brain in am. Not sure repeat echo would be informative · Treat UTI with rocephin. Send urine for culture · Urine culture from July grew klebsiella pneumoniae, sens to rocephin · Continue diabetes and HTN management · She is on lipid-lowering management · Check TSH, Mg, B12, CK, sed rate · Neurology consultation. Case management consult 
· PT/OT and speech evaluations · lovenox for DVT prophylaxis Estimated LOS:   
 
Signed: 
Roula Gee MD

## 2018-09-01 NOTE — PROGRESS NOTES
Ischemic Stroke without Activase/TIA 
 
VTE Prophylaxis: Yes: lovenox Antiplatelet: Yes: ASA Statin if LDL Greater Than or Equal to100: Yes: lipitor BP Parameters: Less Than 220/120 for 24 hours, then consult MD for parameters Controlled With: Scheduled PO Dysphagia Screen Completed: Yes: Pass Patient has PEG, NG Tube, Feeding Tube: No 
 
Medication orders per above route: No - Call MD; Consider consult to pharmacy Nutrition Status: PO 
 
NIH Stroke Scale Complete: Yes: 4 Frequency of Vital Signs: Every 4 hours Frequency of Neuro Checks: Every 4 hours Daily Education/Care Plan Updated: Yes Mary Garcia RN

## 2018-09-02 PROBLEM — G45.9 TIA (TRANSIENT ISCHEMIC ATTACK): Status: ACTIVE | Noted: 2018-09-02

## 2018-09-02 LAB
CK SERPL-CCNC: 62 U/L (ref 21–215)
ERYTHROCYTE [SEDIMENTATION RATE] IN BLOOD: 25 MM/HR (ref 0–30)
GLUCOSE BLD STRIP.AUTO-MCNC: 165 MG/DL (ref 65–100)
GLUCOSE BLD STRIP.AUTO-MCNC: 279 MG/DL (ref 65–100)
GLUCOSE BLD STRIP.AUTO-MCNC: 284 MG/DL (ref 65–100)
GLUCOSE BLD STRIP.AUTO-MCNC: 315 MG/DL (ref 65–100)
MAGNESIUM SERPL-MCNC: 2 MG/DL (ref 1.8–2.4)
PHOSPHATE SERPL-MCNC: 3.1 MG/DL (ref 2.3–3.7)
TSH SERPL DL<=0.005 MIU/L-ACNC: 0.59 UIU/ML (ref 0.36–3.74)
VIT B12 SERPL-MCNC: 1017 PG/ML (ref 193–986)

## 2018-09-02 PROCEDURE — 74011250637 HC RX REV CODE- 250/637: Performed by: FAMILY MEDICINE

## 2018-09-02 PROCEDURE — 82550 ASSAY OF CK (CPK): CPT

## 2018-09-02 PROCEDURE — 83735 ASSAY OF MAGNESIUM: CPT

## 2018-09-02 PROCEDURE — 84443 ASSAY THYROID STIM HORMONE: CPT

## 2018-09-02 PROCEDURE — 82607 VITAMIN B-12: CPT

## 2018-09-02 PROCEDURE — 74011636637 HC RX REV CODE- 636/637: Performed by: FAMILY MEDICINE

## 2018-09-02 PROCEDURE — 74011250637 HC RX REV CODE- 250/637: Performed by: HOSPITALIST

## 2018-09-02 PROCEDURE — 36415 COLL VENOUS BLD VENIPUNCTURE: CPT

## 2018-09-02 PROCEDURE — 82962 GLUCOSE BLOOD TEST: CPT

## 2018-09-02 PROCEDURE — 74011636637 HC RX REV CODE- 636/637: Performed by: HOSPITALIST

## 2018-09-02 PROCEDURE — 65660000000 HC RM CCU STEPDOWN

## 2018-09-02 PROCEDURE — 84100 ASSAY OF PHOSPHORUS: CPT

## 2018-09-02 PROCEDURE — 74011000258 HC RX REV CODE- 258: Performed by: HOSPITALIST

## 2018-09-02 PROCEDURE — 74011250636 HC RX REV CODE- 250/636: Performed by: HOSPITALIST

## 2018-09-02 PROCEDURE — 85652 RBC SED RATE AUTOMATED: CPT

## 2018-09-02 RX ORDER — INSULIN GLARGINE 100 [IU]/ML
10 INJECTION, SOLUTION SUBCUTANEOUS ONCE
Status: COMPLETED | OUTPATIENT
Start: 2018-09-02 | End: 2018-09-02

## 2018-09-02 RX ORDER — INSULIN GLARGINE 100 [IU]/ML
20 INJECTION, SOLUTION SUBCUTANEOUS DAILY
Status: DISCONTINUED | OUTPATIENT
Start: 2018-09-03 | End: 2018-09-03 | Stop reason: HOSPADM

## 2018-09-02 RX ADMIN — EZETIMIBE 10 MG: 10 TABLET ORAL at 10:03

## 2018-09-02 RX ADMIN — Medication 250 MG: at 10:00

## 2018-09-02 RX ADMIN — INSULIN GLARGINE 10 UNITS: 100 INJECTION, SOLUTION SUBCUTANEOUS at 17:02

## 2018-09-02 RX ADMIN — CALCIUM CARBONATE-CHOLECALCIFEROL TAB 250 MG-125 UNIT 1 TABLET: 250-125 TAB at 10:01

## 2018-09-02 RX ADMIN — CLOPIDOGREL BISULFATE 75 MG: 75 TABLET ORAL at 10:01

## 2018-09-02 RX ADMIN — INSULIN LISPRO 8 UNITS: 100 INJECTION, SOLUTION INTRAVENOUS; SUBCUTANEOUS at 11:56

## 2018-09-02 RX ADMIN — INSULIN LISPRO 6 UNITS: 100 INJECTION, SOLUTION INTRAVENOUS; SUBCUTANEOUS at 22:39

## 2018-09-02 RX ADMIN — GABAPENTIN 600 MG: 300 CAPSULE ORAL at 10:00

## 2018-09-02 RX ADMIN — ENOXAPARIN SODIUM 40 MG: 40 INJECTION, SOLUTION INTRAVENOUS; SUBCUTANEOUS at 10:00

## 2018-09-02 RX ADMIN — CEFTRIAXONE SODIUM 1 G: 1 INJECTION, POWDER, FOR SOLUTION INTRAMUSCULAR; INTRAVENOUS at 03:56

## 2018-09-02 RX ADMIN — ATORVASTATIN CALCIUM 80 MG: 40 TABLET, FILM COATED ORAL at 10:00

## 2018-09-02 RX ADMIN — CYANOCOBALAMIN TAB 1000 MCG 1000 MCG: 1000 TAB at 10:01

## 2018-09-02 RX ADMIN — INSULIN LISPRO 2 UNITS: 100 INJECTION, SOLUTION INTRAVENOUS; SUBCUTANEOUS at 09:59

## 2018-09-02 RX ADMIN — Medication 10 ML: at 22:46

## 2018-09-02 RX ADMIN — INSULIN LISPRO 6 UNITS: 100 INJECTION, SOLUTION INTRAVENOUS; SUBCUTANEOUS at 17:02

## 2018-09-02 RX ADMIN — Medication 250 MG: at 17:01

## 2018-09-02 RX ADMIN — MULTIPLE VITAMINS W/ MINERALS TAB 1 TABLET: TAB at 10:01

## 2018-09-02 RX ADMIN — Medication 5 ML: at 17:06

## 2018-09-02 RX ADMIN — CYCLOBENZAPRINE HYDROCHLORIDE 10 MG: 10 TABLET, FILM COATED ORAL at 10:00

## 2018-09-02 RX ADMIN — Medication 200 MG: at 10:00

## 2018-09-02 RX ADMIN — LEVOTHYROXINE SODIUM 100 MCG: 100 TABLET ORAL at 06:11

## 2018-09-02 RX ADMIN — GABAPENTIN 600 MG: 300 CAPSULE ORAL at 22:45

## 2018-09-02 RX ADMIN — INSULIN GLARGINE 10 UNITS: 100 INJECTION, SOLUTION SUBCUTANEOUS at 09:59

## 2018-09-02 RX ADMIN — ASPIRIN 81 MG 81 MG: 81 TABLET ORAL at 10:03

## 2018-09-02 RX ADMIN — OXYBUTYNIN CHLORIDE 10 MG: 10 TABLET, FILM COATED, EXTENDED RELEASE ORAL at 10:11

## 2018-09-02 RX ADMIN — GABAPENTIN 600 MG: 300 CAPSULE ORAL at 17:01

## 2018-09-02 RX ADMIN — Medication 10 ML: at 06:10

## 2018-09-02 NOTE — PROGRESS NOTES
Progress Note Patient: Adrien Trevino MRN: [id number]  SSN: [id number] YOB: 1950  Age: 80 y.o. Sex: female Admit Date: 8/31/2018 LOS: 1 day Subjective: F/U difficulty with speech. Speech back to normal today. Speech therapy had no further recommendations for therapy. Willing to speak with DM education. Glucose in 300s today. CTA head neck showed no evidence of acute intracranial or cervical arterial disease MRI showed Chronic changes of mild atrophy and probable small vessel disease 
and small remote pontine infarct. No acute infarct. Current Facility-Administered Medications Medication Dose Route Frequency  [START ON 9/3/2018] insulin glargine (LANTUS) injection 20 Units  20 Units SubCUTAneous DAILY  insulin glargine (LANTUS) injection 10 Units  10 Units SubCUTAneous ONCE  
 calcium-vitamin D (OSCAL 250) tablet 1 Tab  1 Tab Oral DAILY  multivitamin, tx-iron-ca-min (THERA-M w/ IRON) tablet 1 Tab  1 Tab Oral DAILY  levothyroxine (SYNTHROID) tablet 100 mcg  100 mcg Oral 6am  
 cyanocobalamin tablet 1,000 mcg  1,000 mcg Oral DAILY  atorvastatin (LIPITOR) tablet 80 mg  80 mg Oral DAILY  oxybutynin chloride XL (DITROPAN XL) tablet 10 mg  10 mg Oral DAILY  ezetimibe (ZETIA) tablet 10 mg  10 mg Oral DAILY  cyclobenzaprine (FLEXERIL) tablet 10 mg  10 mg Oral DAILY  gabapentin (NEURONTIN) capsule 600 mg  600 mg Oral TID  magnesium oxide (MAG-OX) tablet 200 mg  200 mg Oral DAILY  Saccharomyces boulardii (FLORASTOR) capsule 250 mg  250 mg Oral BID  ondansetron (ZOFRAN) injection 4 mg  4 mg IntraVENous Q6H PRN  
 enoxaparin (LOVENOX) injection 40 mg  40 mg SubCUTAneous Q24H  
 dextrose 40% (GLUTOSE) oral gel 1 Tube  15 g Oral PRN  
 glucagon (GLUCAGEN) injection 1 mg  1 mg IntraMUSCular PRN  
 dextrose (D50W) injection syrg 12.5-25 g  25-50 mL IntraVENous PRN  
 insulin lispro (HUMALOG) injection   SubCUTAneous AC&HS  
  clopidogrel (PLAVIX) tablet 75 mg  75 mg Oral DAILY  acetaminophen (TYLENOL) tablet 650 mg  650 mg Oral Q4H PRN  
 bisacodyl (DULCOLAX) tablet 5 mg  5 mg Oral DAILY PRN  
 cefTRIAXone (ROCEPHIN) 1 g in 0.9% sodium chloride (MBP/ADV) 50 mL  1 g IntraVENous Q24H  
 aspirin chewable tablet 81 mg  81 mg Oral DAILY  sodium chloride (NS) flush 5-10 mL  5-10 mL IntraVENous Q8H  
 sodium chloride (NS) flush 5-10 mL  5-10 mL IntraVENous PRN Objective:  
 
Vitals:  
 09/01/18 2345 09/02/18 0400 09/02/18 0759 09/02/18 1217 BP: 128/70 137/75 120/63 145/76 Pulse: (!) 104 83 88 88 Resp: 18 18 18 19 Temp: 98.2 °F (36.8 °C) 97.1 °F (36.2 °C) 98.3 °F (36.8 °C) 98.1 °F (36.7 °C) SpO2: 91% 94% 95% 96% Weight:      
Height:      
  
  
Intake and Output: 
Current Shift: 09/02 0701 - 09/02 1900 In: 120 [P.O.:120] Out: - Last three shifts: 08/31 1901 - 09/02 0700 In: 300 [P.O.:300] Out: 1 Physical Exam:  
General:  Alert, normal speech noted. Eyes:  Conjunctivae/corneas clear. PERRL, EOMs intact. Fundi benign Ears:  Normal TMs and external ear canals both ears. Nose: Nares normal. Septum midline. Mucosa normal. No drainage or sinus tenderness. Mouth/Throat: Lips, mucosa, and tongue normal. Teeth and gums normal.  
Neck: no JVD. Back:   Symmetric, no curvature. ROM normal. No CVA tenderness. Lungs:   Clear to auscultation bilaterally. Heart:  Regular rate and rhythm, S1, S2 normal, no murmur, click, rub or gallop. Abdomen:   Soft, non-tender. Bowel sounds normal. No masses,  No organomegaly. Extremities: Extremities normal, atraumatic, no cyanosis or edema. 5/5 strength of UE and LE bilaterally. No facial droop. No tongue deviation. Pulses: 2+ and symmetric all extremities. Skin: Skin color, texture, turgor normal. No rashes or lesions Lymph nodes: Cervical, supraclavicular, and axillary nodes normal.  
Neurologic:  Normal strength, sensation and reflexes throughout. Lab/Data Review: 
 
Recent Results (from the past 24 hour(s)) GLUCOSE, POC Collection Time: 09/01/18  4:15 PM  
Result Value Ref Range Glucose (POC) 259 (H) 65 - 100 mg/dL GLUCOSE, POC Collection Time: 09/01/18  8:59 PM  
Result Value Ref Range Glucose (POC) 347 (H) 65 - 100 mg/dL SED RATE, AUTOMATED Collection Time: 09/02/18  5:28 AM  
Result Value Ref Range Sed rate, automated 25 0 - 30 mm/hr TSH 3RD GENERATION Collection Time: 09/02/18  5:28 AM  
Result Value Ref Range TSH 0.590 0.358 - 3.740 uIU/mL MAGNESIUM Collection Time: 09/02/18  5:28 AM  
Result Value Ref Range Magnesium 2.0 1.8 - 2.4 mg/dL PHOSPHORUS Collection Time: 09/02/18  5:28 AM  
Result Value Ref Range Phosphorus 3.1 2.3 - 3.7 MG/DL  
CK Collection Time: 09/02/18  5:28 AM  
Result Value Ref Range CK 62 21 - 215 U/L  
VITAMIN B12 Collection Time: 09/02/18  5:28 AM  
Result Value Ref Range Vitamin B12 1017 (H) 193 - 986 pg/mL GLUCOSE, POC Collection Time: 09/02/18  8:00 AM  
Result Value Ref Range Glucose (POC) 165 (H) 65 - 100 mg/dL GLUCOSE, POC Collection Time: 09/02/18 11:24 AM  
Result Value Ref Range Glucose (POC) 315 (H) 65 - 100 mg/dL Assessment/ Plan:  
 
Principal Problem: 
  TIA (transient ischemic attack) (9/2/2018) Active Problems: 
  GERD (gastroesophageal reflux disease) (1/7/2015) Hypothyroidism (1/7/2015) Pure hypercholesterolemia (2/9/2016) Essential hypertension (2/9/2016) Type 2 diabetes mellitus without complication, with long-term current use of insulin ([address] (4/6/2017) Acute UTI (urinary tract infection) (7/9/2018) Difficulty with speech (9/1/2018) No acute changes on MRI. TSH normal. Continue ASA, plavix, and statin. Glucose uncontrolled. Will give another 10 units of Lantus today and continue SS. Urine culture growing gram negative rods.  Look at sensitivities tomorrow. Offered diabetic education and patient is willing to speak with them. Will consult DM education. If glucose levels controlled, will discharge tomorrow. DVT prophylaxis - lovenox Signed By: Dom Gonzalez DO September 2, 2018

## 2018-09-02 NOTE — CONSULTS
Admit Date: 9/1/2018  Referring Physician: Dr. Jeff Arzate Making/Plan/Recommend: Medical chart reviewed. Patient seen and examined. Briefly, this is a 79 y/o female with PMH HTN, DM, h/o Bell's palsy with speech deficits and recently admitted in 7/2018 for right pontine CVA and discharged home. Now, she is admitted for visual changes, dizziness and difficulty walking. MRI and head CT are negative for acute changes. She was not a TPA candidate due to recent CVA. Acute therapy were initiated and current level of function: mild dysarthria at baseline with history of bell's palsy, ambulating 250 ft in hallway with CGA-SBA. Mobility altered due to history of L hip fracture, at baseline. PE:no nystagmus, EOMI, no focal weakness, L F-N with dysmetria. Denies lightheadedness, vertigo symptoms. Patient is , lives with  in a mobile home with 2 steps to enter. She is too advanced functionally for acute inpatient rehabilitation. Spoke with PT regarding vestibular testing prior to d/c. May benefit from home health (vestibular) PT. Thank you for the opportunity to participate in the care of this patient.

## 2018-09-02 NOTE — PROGRESS NOTES
Problem: Falls - Risk of 
Goal: *Absence of Falls Document Michelle Shadow Fall Risk and appropriate interventions in the flowsheet. Outcome: Progressing Towards Goal 
Fall Risk Interventions: 
Mobility Interventions: Communicate number of staff needed for ambulation/transfer Mentation Interventions: Bed/chair exit alarm Medication Interventions: Patient to call before getting OOB Elimination Interventions: Call light in reach, Bed/chair exit alarm

## 2018-09-03 VITALS
TEMPERATURE: 98.3 F | HEART RATE: 94 BPM | HEIGHT: 67 IN | OXYGEN SATURATION: 95 % | WEIGHT: 150 LBS | DIASTOLIC BLOOD PRESSURE: 79 MMHG | RESPIRATION RATE: 18 BRPM | SYSTOLIC BLOOD PRESSURE: 120 MMHG | BODY MASS INDEX: 23.54 KG/M2

## 2018-09-03 LAB
BACTERIA SPEC CULT: ABNORMAL
GLUCOSE BLD STRIP.AUTO-MCNC: 129 MG/DL (ref 65–100)
GLUCOSE BLD STRIP.AUTO-MCNC: 310 MG/DL (ref 65–100)
SERVICE CMNT-IMP: ABNORMAL

## 2018-09-03 PROCEDURE — 74011250637 HC RX REV CODE- 250/637: Performed by: FAMILY MEDICINE

## 2018-09-03 PROCEDURE — 74011250636 HC RX REV CODE- 250/636: Performed by: HOSPITALIST

## 2018-09-03 PROCEDURE — 74011636637 HC RX REV CODE- 636/637: Performed by: HOSPITALIST

## 2018-09-03 PROCEDURE — 74011250637 HC RX REV CODE- 250/637: Performed by: HOSPITALIST

## 2018-09-03 PROCEDURE — 82962 GLUCOSE BLOOD TEST: CPT

## 2018-09-03 PROCEDURE — 74011636637 HC RX REV CODE- 636/637: Performed by: FAMILY MEDICINE

## 2018-09-03 PROCEDURE — 74011000258 HC RX REV CODE- 258: Performed by: HOSPITALIST

## 2018-09-03 PROCEDURE — 97530 THERAPEUTIC ACTIVITIES: CPT

## 2018-09-03 RX ORDER — CLOPIDOGREL BISULFATE 75 MG/1
75 TABLET ORAL DAILY
Qty: 30 TAB | Refills: 0 | Status: SHIPPED | OUTPATIENT
Start: 2018-09-04 | End: 2018-09-14 | Stop reason: SDUPTHER

## 2018-09-03 RX ORDER — INSULIN LISPRO 100 [IU]/ML
INJECTION, SOLUTION INTRAVENOUS; SUBCUTANEOUS
Qty: 1 VIAL | Refills: 0 | Status: SHIPPED | OUTPATIENT
Start: 2018-09-03 | End: 2019-01-23 | Stop reason: SDUPTHER

## 2018-09-03 RX ORDER — SULFAMETHOXAZOLE AND TRIMETHOPRIM 800; 160 MG/1; MG/1
1 TABLET ORAL 2 TIMES DAILY
Qty: 6 TAB | Refills: 0 | Status: SHIPPED | OUTPATIENT
Start: 2018-09-03 | End: 2018-09-10

## 2018-09-03 RX ADMIN — Medication 200 MG: at 08:22

## 2018-09-03 RX ADMIN — GABAPENTIN 600 MG: 300 CAPSULE ORAL at 08:22

## 2018-09-03 RX ADMIN — MULTIPLE VITAMINS W/ MINERALS TAB 1 TABLET: TAB at 08:22

## 2018-09-03 RX ADMIN — INSULIN LISPRO 8 UNITS: 100 INJECTION, SOLUTION INTRAVENOUS; SUBCUTANEOUS at 11:29

## 2018-09-03 RX ADMIN — Medication 250 MG: at 08:22

## 2018-09-03 RX ADMIN — CYANOCOBALAMIN TAB 1000 MCG 1000 MCG: 1000 TAB at 08:23

## 2018-09-03 RX ADMIN — CEFTRIAXONE SODIUM 1 G: 1 INJECTION, POWDER, FOR SOLUTION INTRAMUSCULAR; INTRAVENOUS at 03:08

## 2018-09-03 RX ADMIN — INSULIN GLARGINE 20 UNITS: 100 INJECTION, SOLUTION SUBCUTANEOUS at 08:30

## 2018-09-03 RX ADMIN — CALCIUM CARBONATE-CHOLECALCIFEROL TAB 250 MG-125 UNIT 1 TABLET: 250-125 TAB at 08:23

## 2018-09-03 RX ADMIN — LEVOTHYROXINE SODIUM 100 MCG: 100 TABLET ORAL at 06:30

## 2018-09-03 RX ADMIN — OXYBUTYNIN CHLORIDE 10 MG: 10 TABLET, FILM COATED, EXTENDED RELEASE ORAL at 10:18

## 2018-09-03 RX ADMIN — ATORVASTATIN CALCIUM 80 MG: 40 TABLET, FILM COATED ORAL at 08:22

## 2018-09-03 RX ADMIN — CYCLOBENZAPRINE HYDROCHLORIDE 10 MG: 10 TABLET, FILM COATED ORAL at 08:22

## 2018-09-03 RX ADMIN — EZETIMIBE 10 MG: 10 TABLET ORAL at 08:22

## 2018-09-03 RX ADMIN — Medication 10 ML: at 06:31

## 2018-09-03 RX ADMIN — ENOXAPARIN SODIUM 40 MG: 40 INJECTION, SOLUTION INTRAVENOUS; SUBCUTANEOUS at 08:22

## 2018-09-03 RX ADMIN — ASPIRIN 81 MG 81 MG: 81 TABLET ORAL at 08:23

## 2018-09-03 RX ADMIN — CLOPIDOGREL BISULFATE 75 MG: 75 TABLET ORAL at 08:22

## 2018-09-03 NOTE — DISCHARGE SUMMARY
Hospitalist Discharge Summary     Patient ID:  Leonardo Aviles  [id number]  30 y.o.  1950  Admit date: 8/31/2018 11:42 PM  Discharge date and time: 9/3/2018  Attending: Rohan Davila MD  PCP:  Ankur Rolle MD  Treatment Team: Attending Provider: Rohan Davila MD; Care Manager: Asa Adame    Principal Diagnosis TIA (transient ischemic attack)   Principal Problem:    TIA (transient ischemic attack) (9/2/2018)    Active Problems:    GERD (gastroesophageal reflux disease) (1/7/2015)      Hypothyroidism (1/7/2015)      Pure hypercholesterolemia (2/9/2016)      Essential hypertension (2/9/2016)      Type 2 diabetes mellitus without complication, with long-term current use of insulin ([facility] [address] (4/6/2017)      Acute UTI (urinary tract infection) (7/9/2018)      Difficulty with speech (9/1/2018)       [address] [place], [place] 9 y/o female with hx HTN, bells palsy, DM, HLD, old hip fracture causing some difficulty with ambulation, hypothyroidism was recently hospitalized 7/9-7/11 for acute or subacute small right pontine infarct. She was discharged on asa and appropriate medications for listed medical problems. On 9/1 she was watching TV and she developed acute double vision and slurred speech. STAT head CT was negative. Teleneurology utilized who felt she may be having new CVA symptoms but was not a TPA candidate due to recent CVA. Stat CTA head and neck negative. MRI brain showed chronic changes of mild atrophy and probable small vessel disease and small remote pontine infarct. No acute infarct. Plavix started. Continued ASA and statin. Plavix started. Slurred speech resolved. Speech therapy saw who recommended no further interventions. Glucose also elevated. Patient admits to eating doughnuts and carbs at home for most of her meals. Is willing to go to DM education. Glucose controlled with Lantus 20 units and SS.  Will discharge home with her home medications and changing her Toujeo to 18 units q HS with new medication of SS. No new neurological deficits. Patient states she is back to her baseline. No further double vision or speech difficulties. UTI also found. Will discharge with Bactrim DS BID for the next three days. Urine culture grew Klebsiella pneumoniae sensitive to Bactrim. Patient also had positive BPPB [place]. Vestibular rehab recommended. Will set up for outpatient vestibular rehab. If worsened speech, AMS, chest pain or SOB, please come back to the ED. Please refer to the admission H&P for details of presentation. In summary, the patient is stable for discharge     Significant Diagnostic Studies:       Labs: Results:       Chemistry Recent Labs      08/31/18   2333   GLU  276*   NA  142   K  4.9   CL  108*   CO2  27   BUN  14   CREA  0.64   CA  10.0   AGAP  7   AP  140*   TP  7.6   ALB  3.2   GLOB  4.4*   AGRAT  0.7*      CBC w/Diff Recent Labs      08/31/18   2333   WBC  5.2   RBC  4.48   HGB  11.9   HCT  37.4   PLT  129*   GRANS  54   LYMPH  29   EOS  5      Cardiac Enzymes Recent Labs      09/02/18   0528   CPK  62      Coagulation No results for input(s): PTP, INR, APTT in the last 72 hours. No lab exists for component: INREXT    Lipid Panel Lab Results   Component Value Date/Time    Cholesterol, total 70 07/10/2018 06:07 AM    HDL Cholesterol 30 (L) 07/10/2018 06:07 AM    LDL, calculated 9 07/10/2018 06:07 AM    VLDL, calculated 31 (H) 07/10/2018 06:07 AM    Triglyceride 155 (H) 07/10/2018 06:07 AM    CHOL/HDL Ratio 2.3 07/10/2018 06:07 AM      BNP No results for input(s): BNPP in the last 72 hours.    Liver Enzymes Recent Labs      08/31/18   2333   TP  7.6   ALB  3.2   AP  140*   SGOT  83*      Thyroid Studies Lab Results   Component Value Date/Time    TSH 0.590 09/02/2018 05:28 AM            Discharge Exam:  Visit Vitals    /79 (BP 1 Location: Right arm, BP Patient Position: At rest)    Pulse 94    Temp 98.3 °F (36.8 °C)    Resp 18    Ht 5' 7\" (1.702 m)    Wt 68 kg (150 lb)    SpO2 95%    Breastfeeding No    BMI 23.49 kg/m2     General appearance: alert, cooperative, no distress, appears stated age. No slurred speech   Lungs: clear to auscultation bilaterally  Heart: regular rate and rhythm, S1, S2 normal, no murmur, click, rub or gallop  Abdomen: soft, non-tender. Bowel sounds normal. No masses,  no organomegaly  Extremities: no cyanosis or edema  Neurologic: Grossly normal. Chronic right sided facial weakness from bells palsy     Disposition: home   Discharge Condition: stable  Patient Instructions: as above   Current Discharge Medication List      START taking these medications    Details   clopidogrel (PLAVIX) 75 mg tab Take 1 Tab by mouth daily. Qty: 30 Tab, Refills: 0      insulin lispro (HUMALOG) 100 unit/mL injection Sliding scale as above for blood sugar levels before meals and at bedtime. For Blood Sugar (mg/dL) of:     Less than 150 =   0 units           150 -199 =   2 units  200 -249 =   4 units  250 -299 =   6 units  300 -349 =   8 units  Qty: 1 Vial, Refills: 0      trimethoprim-sulfamethoxazole (BACTRIM DS, SEPTRA DS) 160-800 mg per tablet Take 1 Tab by mouth two (2) times a day. Qty: 6 Tab, Refills: 0         CONTINUE these medications which have CHANGED    Details   insulin glargine (TOUJEO SOLOSTAR U-300 INSULIN) 300 unit/mL (1.5 mL) inpn 18 Units by SubCUTAneous route Daily (before breakfast). Qty: 1 Pen, Refills: 0         CONTINUE these medications which have NOT CHANGED    Details   B.infantis-B.ani-B.long-B.bifi (PROBIOTIC 4X) 10-15 mg TbEC Take 1 Tab by mouth daily. aspirin delayed-release 81 mg tablet Take 1 Tab by mouth daily. Qty: 30 Tab, Refills: 0      vit a,c & e-lutein-minerals (VISION FORMULA, WITH LUTEIN,) tablet Take 1 Tab by mouth daily. magnesium 250 mg tab Take 250 mg by mouth daily. Alpha Lipoic Acid 600 mg cap Take 200 mg by mouth daily.       gabapentin (NEURONTIN) 600 mg tablet TAKE ONE TABLET BY MOUTH THREE TIMES DAILY  Qty: 270 Tab, Refills: 3      !! ONETOUCH ULTRA BLUE TEST STRIP strip USE TO TEST BLOOD SUGARS THREE TIMES A DAY  Qty: 300 Strip, Refills: 11      ezetimibe (ZETIA) 10 mg tablet Take 1 Tab by mouth daily. Qty: 90 Tab, Refills: 3      cyclobenzaprine (FLEXERIL) 10 mg tablet Take 1 Tab by mouth daily. Qty: 90 Tab, Refills: 3      traMADol (ULTRAM) 50 mg tablet TAKE ONE TABLET BY MOUTH TWICE DAILY AS NEEDED FOR 30 DAYS  Qty: 60 Tab, Refills: 5    Associated Diagnoses: Pain      BD INSULIN PEN NEEDLE UF SHORT 31 gauge x 5/16\" ndle USE  TWICE DAILY AS DIRECTED WITH  INSULIN  PENS  Qty: 100 Pen Needle, Refills: 17    Comments: Please consider 90 day supplies to promote better adherence      glyBURIDE-metFORMIN (GLUCOVANCE) 5-500 mg per tablet Take 1 Tab by mouth Daily (before breakfast). Indications: type 2 diabetes mellitus  Qty: 90 Tab, Refills: 3      atorvastatin (LIPITOR) 80 mg tablet Take 1 Tab by mouth daily. Indications: hyperlipidemia  Qty: 90 Tab, Refills: 3    Associated Diagnoses: Pure hypercholesterolemia      cyanocobalamin 1,000 mcg tablet Take 1,000 mcg by mouth daily. levothyroxine (SYNTHROID) 100 mcg tablet TAKE ONE TABLET BY MOUTH ONCE DAILY BEFORE BREAKFAST FOR HYPOTHYROIDISM  Qty: 90 Tab, Refills: 3      !! ONETOUCH ULTRA TEST strip TEST BLOOD SUGAR TWICE A   DAY  Qty: 200 Strip, Refills: 2      CALCIUM CARB/VIT D3/MINERALS (CALCIUM-VITAMIN D PO) Take 1 Tab by mouth daily. MULTIVITAMIN PO Take 1 Tab by mouth daily. !! - Potential duplicate medications found. Please discuss with provider. STOP taking these medications       oxybutynin chloride XL (DITROPAN XL) 10 mg CR tablet Comments:   Reason for Stopping:               Activity: up and moshe   Diet: heart healthy   Wound Care: none     Follow-up PCP in one week. DM education in one week.    ·     Time spent to discharge patient 35 minutes  Signed:  Elizabeth Herman DO  9/3/2018  12:41 PM

## 2018-09-03 NOTE — DISCHARGE INSTRUCTIONS
Moniter blood sugars twice daily, keep a log and take to follow up appointment      Stroke: After Your Visit     Your Care Instructions     You have had a stroke. Risk factors for stroke include being overweight, smoking, and sedentary lifestyle. This means that the blood flow to a part of your brain was blocked for some time, which damages the nerve cells in that part of the brain. The part of your body controlled by that part of your brain may not function properly now. The brain is an amazing organ that can heal itself to some degree. The stroke you had damaged part of your brain, but other parts of your brain may take over in some way for the damaged areas. You have already started this process. Going home may be hard for you and your family. The more you can try to do for yourself, the better. Remember to take each day one at a time. Follow-up care is a key part of your treatment and safety. Be sure to make and go to all appointments, and call your doctor if you are having problems. Its also a good idea to know your test results and keep a list of the medicines you take. How can you care for yourself at home? Enter a stroke rehabilitation (rehab) program, if your doctor recommends it. Physical, speech, and occupational therapies can help you manage bathing, dressing, eating, and other basics of daily living. Eat a heart-healthy diet that is low in cholesterol, saturated fat, and salt. Eat lots of fresh fruits and vegetables and foods high in fiber. Increase your activities slowly. Take short rest breaks when you get tired. Gradually increase the amount you walk. Start out by walking a little more than you did the day before. Do not drive until your doctor says it is okay. It is normal to feel sad or depressed after a stroke. If the blues last, talk to your doctor.   If you are having problems with urine leakage, go to the bathroom at regular times, including when you first wake up and at bedtime. Also, limit fluids after dinner. If you are constipated, drink plenty of fluids, enough so that your urine is light yellow or clear like water. If you have kidney, heart, or liver disease and have to limit fluids, talk with your doctor before you increase the amount of fluids you drink. Set up a regular time for using the toilet. If you continue to have constipation, your doctor may suggest using a bulking agent, such as Metamucil, or a stool softener, laxative, or enema. Medicines  Take your medicines exactly as prescribed. Call your doctor if you think you are having a problem with your medicine. You may be taking several medicines. ACE (angiotensin-converting enzyme) inhibitors, angiotensin II receptor blockers (ARBs), beta-blockers, diuretics (water pills), and calcium channel blockers control your blood pressure. Statins help lower cholesterol. Your doctor may also prescribe medicines for depression, pain, sleep problems, anxiety, or agitation. If your doctor has given you medicine that prevents blood clots, such as warfarin (Coumadin), aspirin combined with extended-release dipyridamole (Aggrenox), clopidogrel (Plavix), or aspirin to prevent another stroke, you should:  Tell your dentist, pharmacist, and other health professionals that you take these medicines. Watch for unusual bruising or bleeding, such as blood in your urine, red or black stools, or bleeding from your nose or gums. Get regular blood tests to check your clotting time if you are taking Coumadin. Wear medical alert jewelry that says you take blood thinners. You can buy this at most drugstores. Do not take any over-the-counter medicines or herbal products without talking to your doctor first.  If you take birth control pills or hormone replacement therapy, talk to your doctor about whether they are right for you. For family members and caregivers  Make the home safe.  Set up a room so that your loved one does not have to climb stairs. Be sure the bathroom is on the same floor. Move throw rugs and furniture that could cause falls, and make sure that the lighting is good. Put grab bars and seats in tubs and showers. Find out what your loved one can do and what he or she needs help with. Try not to do things for your loved one that your loved one can do on his or her own. Help him or her learn and practice new skills. Visit and talk with your loved one often. Try doing activities together that you both enjoy, such as playing cards or board games. Keep in touch with your loved one's friends as much as you can, and encourage them to visit. Take care of yourself. Do not try to do everything yourself. Ask other family members to help. Eat well, get enough rest, and take time to do things that you enjoy. Keep up with your own doctor visits, and make sure to take your medicines regularly. Get out of the house as much as you can. Join a local support group. Find out if you qualify for home health care visits to help with rehab or for adult day care. When should you call for help? Call 911 anytime you think you may need emergency care. For example, call if:  You have signs of another stroke. These may include:  Sudden numbness, paralysis, or weakness in your face, arm, or leg, especially on only one side of your body. New problems with walking or balance. Sudden vision changes. Drooling or slurred speech. New problems speaking or understanding simple statements, or you feel confused. A sudden, severe headache that is different from past headaches. Call 911 even if these symptoms go away in a few minutes. You cough up blood. You vomit blood or what looks like coffee grounds. You pass maroon or very bloody stools. Call your doctor now or seek immediate medical care if:  You have new bruises or blood spots under your skin. You have a nosebleed. Your gums bleed when you brush your teeth. You have blood in your urine.   Your stools are black and tarlike or have streaks of blood. You have vaginal bleeding when you are not having your period, or heavy period bleeding. You have new symptoms that may be related to your stroke, such as falls or trouble swallowing. Watch closely for changes in your health, and be sure to contact your doctor if you have any problems. Where can you learn more? Go to Yippy.be    Enter C294  in the search box to learn more about \"Stroke: After Your Visit\". © 6676-9146 [employer], [employer]. Care instructions adapted under license by [facility] (which disclaims liability or warranty for this information). This care instruction is for use with your licensed healthcare professional. If you have questions about a medical condition or this instruction, always ask your healthcare professional. Stephanie Felton any warranty or liability for your use of this information. Urinary Tract Infection in Women: Care Instructions  Your Care Instructions    A urinary tract infection, or UTI, is a general term for an infection anywhere between the kidneys and the urethra (where urine comes out). Most UTIs are bladder infections. They often cause pain or burning when you urinate. UTIs are caused by bacteria and can be cured with antibiotics. Be sure to complete your treatment so that the infection goes away. Follow-up care is a key part of your treatment and safety. Be sure to make and go to all appointments, and call your doctor if you are having problems. It's also a good idea to know your test results and keep a list of the medicines you take. How can you care for yourself at home? · Take your antibiotics as directed. Do not stop taking them just because you feel better. You need to take the full course of antibiotics. · Drink extra water and other fluids for the next day or two. This may help wash out the bacteria that are causing the infection.  (If you have kidney, heart, or liver disease and have to limit fluids, talk with your doctor before you increase your fluid intake.)  · Avoid drinks that are carbonated or have caffeine. They can irritate the bladder. · Urinate often. Try to empty your bladder each time. · To relieve pain, take a hot bath or lay a heating pad set on low over your lower belly or genital area. Never go to sleep with a heating pad in place. To prevent UTIs  · Drink plenty of water each day. This helps you urinate often, which clears bacteria from your system. (If you have kidney, heart, or liver disease and have to limit fluids, talk with your doctor before you increase your fluid intake.)  · Urinate when you need to. · Urinate right after you have sex. · Change sanitary pads often. · Avoid douches, bubble baths, feminine hygiene sprays, and other feminine hygiene products that have deodorants. · After going to the bathroom, wipe from front to back. When should you call for help? Call your doctor now or seek immediate medical care if:    · Symptoms such as fever, chills, nausea, or vomiting get worse or appear for the first time.     · You have new pain in your back just below your rib cage. This is called flank pain.     · There is new blood or pus in your urine.     · You have any problems with your antibiotic medicine.    Watch closely for changes in your health, and be sure to contact your doctor if:    · You are not getting better after taking an antibiotic for 2 days.     · Your symptoms go away but then come back. Where can you learn more? Go to http://kevin-david.info/. Enter X570 in the search box to learn more about \"Urinary Tract Infection in Women: Care Instructions. \"  Current as of: May 12, 2017  Content Version: 11.7  © 2328-3375 [employer]. Care instructions adapted under license by [employer] [employer] [employer] (which disclaims liability or warranty for this information).  If you have questions about a medical condition or this instruction, always ask your healthcare professional. [address] any warranty or liability for your use of this information. DISCHARGE SUMMARY from Nurse    PATIENT INSTRUCTIONS:    After general anesthesia or intravenous sedation, for 24 hours or while taking prescription Narcotics:  · Limit your activities  · Do not drive and operate hazardous machinery  · Do not make important personal or business decisions  · Do  not drink alcoholic beverages  · If you have not urinated within 8 hours after discharge, please contact your surgeon on call. Report the following to your surgeon:  · Excessive pain, swelling, redness or odor of or around the surgical area  · Temperature over 100.5  · Nausea and vomiting lasting longer than 4 hours or if unable to take medications  · Any signs of decreased circulation or nerve impairment to extremity: change in color, persistent  numbness, tingling, coldness or increase pain  · Any questions    What to do at Home:  Recommended activity: Activity as tolerated,     If you experience any of the following symptoms see discharge instructions, please follow up with primary care. *  Please give a list of your current medications to your Primary Care Provider. *  Please update this list whenever your medications are discontinued, doses are      changed, or new medications (including over-the-counter products) are added. *  Please carry medication information at all times in case of emergency situations. These are general instructions for a healthy lifestyle:    No smoking/ No tobacco products/ Avoid exposure to second hand smoke  Surgeon General's Warning:  Quitting smoking now greatly reduces serious risk to your health.     Obesity, smoking, and sedentary lifestyle greatly increases your risk for illness    A healthy diet, regular physical exercise & weight monitoring are important for maintaining a healthy lifestyle    You may be retaining fluid if you have a history of heart failure or if you experience any of the following symptoms:  Weight gain of 3 pounds or more overnight or 5 pounds in a week, increased swelling in our hands or feet or shortness of breath while lying flat in bed. Please call your doctor as soon as you notice any of these symptoms; do not wait until your next office visit. Recognize signs and symptoms of STROKE:    F-face looks uneven    A-arms unable to move or move unevenly    S-speech slurred or non-existent    T-time-call 911 as soon as signs and symptoms begin-DO NOT go       Back to bed or wait to see if you get better-TIME IS BRAIN. Warning Signs of HEART ATTACK     Call 911 if you have these symptoms:   Chest discomfort. Most heart attacks involve discomfort in the center of the chest that lasts more than a few minutes, or that goes away and comes back. It can feel like uncomfortable pressure, squeezing, fullness, or pain.  Discomfort in other areas of the upper body. Symptoms can include pain or discomfort in one or both arms, the back, neck, jaw, or stomach.  Shortness of breath with or without chest discomfort.  Other signs may include breaking out in a cold sweat, nausea, or lightheadedness. Don't wait more than five minutes to call 911 - MINUTES MATTER! Fast action can save your life. Calling 911 is almost always the fastest way to get lifesaving treatment. Emergency Medical Services staff can begin treatment when they arrive -- up to an hour sooner than if someone gets to the hospital by car. The discharge information has been reviewed with the patient. The patient verbalized understanding. Discharge medications reviewed with the patient and appropriate educational materials and side effects teaching were provided.   ___________________________________________________________________________________________________________________________________

## 2018-09-03 NOTE — PROGRESS NOTES
Problem: Mobility Impaired (Adult and Pediatric) Goal: *Acute Goals and Plan of Care (Insert Text) Discharge Goals: 
(1.)Ms. Huang will ascend/descend 2 steps with use of railing with SUPERVISION within 7 treatment day(s). (2.)Ms. Kofi Madrigal will transfer from bed to chair and chair to bed with INDEPENDENT using the least restrictive device within 7 treatment day(s). (3.)Ms. Huang will ambulate with INDEPENDENT for 500+ feet with the least restrictive device within 7 treatment day(s). (4.)Ms. Huang will tolerate epley maneuver with no increase in dizziness (0/10) within 7 treatment days to improve safety with transfers and ambulation. Goal Added 9/3/18 
________________________________________________________________________________________________ PHYSICAL THERAPY: Daily Note, Treatment Day: 1st, PM 9/3/2018 INPATIENT: Hospital Day: 4 Payor: Melissa Easley / Plan: [address] [place] [address] HMO / Product Type: Managed Care Medicare /  
  
NAME/AGE/GENDER: Mendy Burnett is a 80 y.o. female PRIMARY DIAGNOSIS: Acute CVA (cerebrovascular accident) ([facility] [address] TIA (transient ischemic attack) TIA (transient ischemic attack) ICD-10: Treatment Diagnosis:  
 · Generalized Muscle Weakness (M62.81) · Difficulty in walking, Not elsewhere classified (R26.2) Precaution/Allergies: 
Codeine and Tramadol ASSESSMENT:  
 
Ms. Kofi Madrigal is supine in bed upon contact and agreeable to PT treatment this PM>. Pt is A&O X 4 with reports of 0/10 pain prior to mobility. Pt lives with mobile home with her  and 2 children with 2 steps to enter. Pt reports independence with gait and ADLs and 0 falls in past 6 months. Pt transitioned supine to sit EOB independently and performed STS with SBA. Visual pursuit tested and found to have smooth pursuit with no increase in dizziness, VOR normal. Performed [place] Hall[place] maneuver and positive to \"room spinning dizziness\" with testing to R side.  Epley maneuver performed with education on bed mobility with resolution of dizziness within 2 minutes of first position. No dizziness throughout other positions and patient reports an improvement in dizziness with 2nd test. Pt ambulated 250 ft in hallway with CGA-SBA. Pt ambulates with compensations noted due to history of L hip fracture with pt reporting this being her baseline. Standing balance activities in room performed with supervision and sit<>stand from various surface heights with supervision. Discussed with patient and education provided on BPPV, as patient exhibited positive signs/symptoms of BPPV posterior canal. Patient in agreement to outpatient PT follow up post d/c. Pt appears to be functioning close to her baseline and may benefit from 1-2 more sessions while in acute setting to maximize function and independence. This section established at most recent assessment PROBLEM LIST (Impairments causing functional limitations): 1. Decreased Strength 2. Decreased ADL/Functional Activities 3. Decreased Transfer Abilities 4. Decreased Ambulation Ability/Technique 5. Decreased Balance 6. Decreased Activity Tolerance 7. Decreased Pacing Skills 8. Increased Fatigue 9. Decreased [place] with Home Exercise Program 
 INTERVENTIONS PLANNED: (Benefits and precautions of physical therapy have been discussed with the patient.) 1. Balance Exercise 2. Bed Mobility 3. Family Education 4. Gait Training 5. Home Exercise Program (HEP) 6. Neuromuscular Re-education/Strengthening 7. Therapeutic Activites 8. Therapeutic Exercise/Strengthening 9. Transfer Training TREATMENT PLAN: Frequency/Duration: 3 times a week for 1 week Rehabilitation Potential For Stated Goals: Good RECOMMENDED REHABILITATION/EQUIPMENT: (at time of discharge pending progress): Due to the probability of continued deficits (see above) this patient will not likely need continued skilled physical therapy after discharge. Equipment:  
? Walkers, Type: Rolling [place] [employer] HISTORY:  
History of Present Injury/Illness (Reason for Referral): 
See H&P below 79 y/o female with hx HTN, DM, HLD, hypothyroidism was recently hospitalized 7/9-7/11 for acute or subacute small right pontine infarct. She was discharged on asa and appropriate medications for listed medical problems. Tonight while she was watching TV she developed acute double vision and unsteady gait. She was dizzy. She checked her glucose and was 300. She thinks because she had eaten donuts earlier. She thought she was having another stroke so came to ED where a code stroke called. STAT head CT was negative. Teleneurology utilized who felt she may be having new CVA symptoms but was not a TPA candidate due to recent CVA. Stat CTA head and neck negative according to preliminary report. Will re-admit for possible new CVA. Aspirin was administered in ED. Also noted is a UTI however she is asymptomatic from this. Past Medical History/Comorbidities: Ms. Rhonda Jackman  has a past medical history of Chronic musculoskeletal pain; CVA (cerebral vascular accident) ([facility] [address] (07/2018); Cystocele with rectocele; Essential hypertension (2/9/2016); GERD (gastroesophageal reflux disease); Hip fracture requiring operative repair ([facility] [address] (1/9/2015); Hip fracture, left ([facility] [address] (1/7/2015); Hyperlipidemia; Hypothyroidism; Impaired mobility and ADLs; Pure hypercholesterolemia (2/9/2016); and Type 2 diabetes mellitus without complication ([facility] [address] (8/7/5218). Ms. Rhonda Jackman  has a past surgical history that includes hx tonsillectomy; hx tubal ligation; hx dilation and curettage; hx colonoscopy; hx magali and bso; and hx hip fracture tx (Left). Social History/Living Environment:  
Home Environment: Trailer/mobile home # Steps to Enter: 2 One/Two Story Residence: One story Living Alone: No 
Support Systems: Child(trey), Spouse/Significant Other/Partner Patient Expects to be Discharged to[de-identified] Trailer/mobile home Current DME Used/Available at Home: Beatriz Miramontes Tub or Shower Type: Tub/Shower combination Prior Level of Function/Work/Activity: 
Lives with family, indep with all mobility, 0 falls Number of Personal Factors/Comorbidities that affect the Plan of Care: 3+: HIGH COMPLEXITY EXAMINATION:  
Most Recent Physical Functioning:  
Gross Assessment: 
  
         
  
Posture: 
  
Balance: 
Sitting: Intact Standing: Impaired Standing - Static: Good Standing - Dynamic : Fair Bed Mobility: 
Rolling: Independent Supine to Sit: Independent Sit to Supine: Independent Scooting: Independent Wheelchair Mobility: 
  
Transfers: 
Sit to Stand: Supervision Stand to Sit: Supervision Interventions: Safety awareness training;Verbal cues; Visual cues Gait: 
  
Base of Support: Narrowed; Center of gravity altered Speed/Rocio: Slow Distance (ft): 250 Feet (ft) Assistive Device:  (none) Ambulation - Level of Assistance: Stand-by assistance;Contact guard assistance;Supervision Interventions: Safety awareness training;Verbal cues Body Structures Involved: 1. Nerves 2. Heart 3. Lungs 4. Bones 5. Muscles Body Functions Affected: 1. Cardio 2. Respiratory 3. Neuromusculoskeletal 
4. Movement Related Activities and Participation Affected: 1. General Tasks and Demands 2. Mobility 3. Self Care 4. Domestic Life 5. Interpersonal Interactions and Relationships 6. Community, Social and [place] Number of elements that affect the Plan of Care: 4+: HIGH COMPLEXITY CLINICAL PRESENTATION:  
Presentation: Stable and uncomplicated: LOW COMPLEXITY CLINICAL DECISION MAKIN [facility] [id number] [facility] 6 Clicks Basic Mobility Inpatient Short Form How much difficulty does the patient currently have. .. Unable A Lot A Little None 1. Turning over in bed (including adjusting bedclothes, sheets and blankets)? [] 1   [] 2   [] 3   [x] 4  
2.   Sitting down on and standing up from a chair with arms ( e.g., wheelchair, bedside commode, etc.)   [] 1   [] 2   [] 3   [x] 4  
3. Moving from lying on back to sitting on the side of the bed? [] 1   [] 2   [] 3   [x] 4 How much help from another person does the patient currently need. .. Total A Lot A Little None 4. Moving to and from a bed to a chair (including a wheelchair)? [] 1   [] 2   [] 3   [x] 4  
5. Need to walk in hospital room? [] 1   [] 2   [x] 3   [] 4  
6. Climbing 3-5 steps with a railing? [] 1   [] 2   [x] 3   [] 4  
© 2007, Trustees of [facility] [employer], under license to [employer]. All rights reserved Score:  Initial: 22 Most Recent: X (Date: -- ) Interpretation of Tool:  Represents activities that are increasingly more difficult (i.e. Bed mobility, Transfers, Gait). Score 24 23 22-20 19-15 14-10 9-7 6 Modifier CH CI CJ CK CL CM CN   
 
? Mobility - Walking and Moving Around:  
  - CURRENT STATUS: CJ - 20%-39% impaired, limited or restricted  - GOAL STATUS: CI - 1%-19% impaired, limited or restricted  - D/C STATUS:  ---------------To be determined--------------- Payor: Joslyn Tadeo / Plan: [address] [place] [address] HMO / Product Type: Managed Care Medicare /   
 
Medical Necessity:    
· Patient is expected to demonstrate progress in strength, balance, coordination and functional technique to decrease assistance required with gait, transfers, and functional mobility. Sara Lieberman Reason for Services/Other Comments: 
· Patient continues to require skilled intervention due to maximize function and independence while in acute setting. .  
Use of outcome tool(s) and clinical judgement create a POC that gives a: Clear prediction of patient's progress: LOW COMPLEXITY  
  
 
 
 
TREATMENT:  
(In addition to Assessment/Re-Assessment sessions the following treatments were rendered) Pre-treatment Symptoms/Complaints:  None \"Im feeling better\" Pain: Initial:  
Pain Intensity 1: 0  Post Session:  0/10 Therapeutic Activity: (    38 minutes): Therapeutic activities including bed mobility, standing balance activities, sit to stand from various surface heights and Ambulation on level ground and pacing techniques, cues for safety of transfers to improve mobility, strength, balance and coordination. Required minimal Safety awareness training;Verbal cues to promote dynamic balance in standing. Braces/Orthotics/Lines/Etc:  
· O2 Device: Room air Treatment/Session Assessment:   
· Response to Treatment:  Ambulated 250 ft with CGA-SBA, close to baseline. · Interdisciplinary Collaboration:  
o Physical Therapist 
o Registered Nurse 
o SPT · After treatment position/precautions:  
o Supine in bed 
o Bed/Chair-wheels locked 
o Bed in low position 
o Call light within reach 
o RN notified 
o Family at bedside · Compliance with Program/Exercises: Will assess as treatment progresses. · Recommendations/Intent for next treatment session: \"Next visit will focus on advancements to more challenging activities and reduction in assistance provided\". Total Treatment Duration: PT Patient Time In/Time Out Time In: 1451 Time Out: 1323 Bo Russo DPT

## 2018-09-03 NOTE — PROGRESS NOTES
Discharge instructions  all new medications,medication side effects sheet, follow up appointment and  prescriptions reviewed and explained to the patient. Pateint given information on following up with diabetic teaching as an outpatient Patient verbalizes understanding of instructions. A copy of discharge instructions and prescriptions  have been given to patient. Opportunity for questions provided.

## 2018-09-03 NOTE — PROGRESS NOTES
CM met with patient regarding consult for discharge planning. SOCIAL: 
Patient lives at home with spouse and 2 adult children. She lives in a mobile home with 2 steps at entrance and a \"handicap accessible bathroom with rails\". Her spouse is disabled. Son and daughter both work during the daytime. No additional social supports. No sitters / aids / caregivers / CLTC hours.  is HCPOA, per patient. PCP is Dr. Navi Whitaker. Last visit on 7-. She is not a . Prescriptions are covered by insurance. MOBILITY / HISTORY: 
Patient reports she has been ambulating without devices since recent discharge in July 2018. She is able to manage ADLs with independence. She reports driving herself. No home oxygen. No dialysis. DME - Rolling walker, BSC, shower chair, scooter, crutches. [facility] history after recent discharge. [facility] history in 2013 after hip fracture. PLAN FOR DISCHARGE: 
Patient denies need for any continuing therapies after discharge. Plan to DC home with support from spouse and children as needed. Therapy consults completed. All needed DME at home already. Diabetes management consult pending to assist patient with diet control. CM utilized motivational interviewing strategies to encourage patient to manage her healthcare / diabetes management at home as recommended. DC to home when medically ready. Care Management Interventions PCP Verified by CM: Yes Mode of Transport at Discharge: Other (see comment) (family) Transition of Care Consult (CM Consult): Discharge Planning (No anticipated needs.) Discharge Durable Medical Equipment: No (Patient has a rolling walker, BSC, crutches, scooter, and shower chair at home.) Physical Therapy Consult: Yes Occupational Therapy Consult: Yes Speech Therapy Consult: Yes Current Support Network: Own Home (Patient's 2 adult childen also live in the home.) Confirm Follow Up Transport: Family Plan discussed with Pt/Family/Caregiver:  Yes 
 Freedom of Choice Offered: Yes Discharge Location Discharge Placement: Home

## 2018-09-04 ENCOUNTER — PATIENT OUTREACH (OUTPATIENT)
Dept: CASE MANAGEMENT | Age: 68
End: 2018-09-04

## 2018-09-05 ENCOUNTER — PATIENT OUTREACH (OUTPATIENT)
Dept: CASE MANAGEMENT | Age: 68
End: 2018-09-05

## 2018-09-06 ENCOUNTER — PATIENT OUTREACH (OUTPATIENT)
Dept: CASE MANAGEMENT | Age: 68
End: 2018-09-06

## 2018-09-06 NOTE — PROGRESS NOTES
Downtime Progress Note for Transcend : [facility]  Follow up Outreach Note   Outreach type: Follow up  Patient name: Gabriella Shelton  : [birth year]  MRN: [id number]    Date/Time of Outreach: 18     Reason for follow-up:   Continuation of care   Disease specific complaints/issues: Transient cerebral ischemic attack. Patient progress towards goals set from last contact:   Nurse reached out to Dr Janie sabillon and found out that pt will be seeing Drs on the  which is within the 7 day [place]. unable to establish communication with pt at this time. will continue to reach out and provide care as needed. Has patient attended any PCP or specialist follow-up appointments since last contact? What was outcome of appointment? When is next follow-up scheduled? Review medications. Any medication changes since last outreach? Does patient have any questions or issues related to their medications? Home health active? If yes - any issue? Progress? Referrals needed?  (SW, Diabetes education, HH, etc. )    Other issues/Miscellaneous?  (Transportation, access to meals, ability to perform ADLs, adequate caregiver support, etc.)              Next Outreach Scheduled: Before the end of the week     Next Steps/Goals:      Care  completing call: Gilda Dhillon

## 2018-09-07 NOTE — PROGRESS NOTES
Downtime Progress Note for Transcend : [facility]  Follow up Outreach Note   Outreach type: Follow up  Patient name: Leticia Rivera  : 1950  MRN: [id number]    Date/Time of Outreach: 18     Reason for follow-up:   Continuation of care   Disease specific complaints/issues: TIA with HTN       Patient progress towards goals set from last contact:   2018 Spoke with pt. pt verbalized that she was feeling dizzy and ask her  to take her to the ER. pt was then diagnosed with a TIA with HTN. lives with  and two children who assist with care as needed   Has patient attended any PCP or specialist follow-up appointments since last contact? What was outcome of appointment? When is next follow-up scheduled?   verbalized that she is seeing PCP on the . Nurse educate on the importance of keeping all appointment. Review medications. Any medication changes since last outreach? Does patient have any questions or issues related to their medications?   pt is now taking Plavix. nurse educate on the importance of taking med timely for best therapeutic effect, understanding verbalized. made aware of bruising and bleeding. encourage to call 911 if black tarry stool is observed. Home health active? If yes - any issue? Progress? NA   Referrals needed?  (SW, Diabetes education, HH, etc. ) NA   Other issues/Miscellaneous? (Transportation, access to meals, ability to perform ADLs, adequate caregiver support, etc.)       . pt verbalized how active she was. nurse encourage to reduce her activity. stress can put her at risk for another stroke, understanding verbalized.  is assisting with all ADL's as needed.  transport to all appointments as needed. nurse will continue to monitor and provide care as needed.        Next Outreach Scheduled: Week of the      Next Steps/Goals:      Care  completing call: Mitra Modi

## 2018-09-10 PROBLEM — F43.21 SITUATIONAL DEPRESSION: Status: ACTIVE | Noted: 2018-09-10

## 2018-09-11 ENCOUNTER — PATIENT OUTREACH (OUTPATIENT)
Dept: CASE MANAGEMENT | Age: 68
End: 2018-09-11

## 2018-09-14 NOTE — PROGRESS NOTES
Downtime Progress Note for Transcend : [facility]  Follow up Outreach Note   Outreach type: Follow up  Patient name: Kevon Michael  : [birth year]  MRN: [id number]    Date/Time of Outreach: 18     Reason for follow-up:   Continuation of Care   Disease specific complaints/issues:   Cerebral infarction     Patient progress towards goals set from last contact:   2018 Spoke with pts . verbalized that pt is not currently home and would like for nurse to call on another day. Has patient attended any PCP or specialist follow-up appointments since last contact? What was outcome of appointment? When is next follow-up scheduled? Review medications. Any medication changes since last outreach? Does patient have any questions or issues related to their medications? Home health active? If yes - any issue? Progress? Referrals needed?  (SW, Diabetes education, HH, etc. )    Other issues/Miscellaneous?  (Transportation, access to meals, ability to perform ADLs, adequate caregiver support, etc.)              Next Outreach Scheduled: Week of the      Next Steps/Goals:      Care  completing call: Ashutosh Woo

## 2018-09-18 ENCOUNTER — PATIENT OUTREACH (OUTPATIENT)
Dept: CASE MANAGEMENT | Age: 68
End: 2018-09-18

## 2018-09-20 NOTE — PROGRESS NOTES
Downtime Progress Note for Transcend : [facility] [facility]  Follow up Outreach Note   Outreach type: Follow up  Patient name: Carlos Lou  : [birth year]  MRN: [id number]    Date/Time of Outreach: 18     Reason for follow-up:   Continuation of care   Disease specific complaints/issues: Cerebral infarction       Patient progress towards goals set from last contact:   2018 Spoke with pt. She is back to her regular routine. Pt is independent of all ADL's. pt drives to all appointments. nurse educate on the importance of keeping all appointments nurse continue to reinforce teachings on how to relieve stress. encourage not to over work herself, understanding verbalized. will call 911 in case of emergency. Has patient attended any PCP or specialist follow-up appointments since last contact? What was outcome of appointment? When is next follow-up scheduled?   verbalized that she Saw PCP on the . Review medications. Any medication changes since last outreach? Does patient have any questions or issues related to their medications? . pt verbalized that she is now started on Plavix. nurse educate on s/sx of bleeding and encourage timely administration. encourage to call 911 if black tarry stool is observed. made pt aware of bruising. Home health active? If yes - any issue? Progress? Referrals needed?  (SW, Diabetes education, HH, etc. )    Other issues/Miscellaneous? (Transportation, access to meals, ability to perform ADLs, adequate caregiver support, etc.)   No needs at  this time. nurse will continue to monitor and provide care as needed.          Next Outreach Scheduled: Week of the      Next Steps/Goals:      Care  completing call: Charlotte Vincent

## 2018-09-25 ENCOUNTER — PATIENT OUTREACH (OUTPATIENT)
Dept: CASE MANAGEMENT | Age: 68
End: 2018-09-25

## 2018-09-27 NOTE — PROGRESS NOTES
Downtime Progress Note for Transcend : [facility] [facility]  Follow up Outreach Note   Outreach type: Follow up  Patient name: Nella Bowen  : [birth year]  MRN: [id number]    Date/Time of Outreach: 18     Reason for follow-up:   Continuation of Care   Disease specific complaints/issues:   Cerebral Infraction     Patient progress towards goals set from last contact:   2018 Spoke with pt. verbalized that she continues to be independent of all ADL's. continues to take all meds as ordered. pt start taking Fosamax for depression. Nurse educate on the importance of notifying PCP if she is having thought of hurting herself, understanding verbalized. encourage to take meds as ordered for it to be effective. pt arrange all her meds in Pill box for timely administration and to help reduce med errors. Continues to drive to all appointment. will be seeing Josue in the Month of October. nurse encourage to keep all appointment. nurse will continue to monitor and provide care as needed. Has patient attended any PCP or specialist follow-up appointments since last contact? What was outcome of appointment? When is next follow-up scheduled? Review medications. Any medication changes since last outreach? Does patient have any questions or issues related to their medications? Home health active? If yes - any issue? Progress? Referrals needed?  (SW, Diabetes education, HH, etc. )    Other issues/Miscellaneous?  (Transportation, access to meals, ability to perform ADLs, adequate caregiver support, etc.)              Next Outreach Scheduled: Week of the 1st     Next Steps/Goals:      Care  completing call: Patel Cuadra

## 2018-10-09 ENCOUNTER — PATIENT OUTREACH (OUTPATIENT)
Dept: CASE MANAGEMENT | Age: 68
End: 2018-10-09

## 2018-10-10 NOTE — PROGRESS NOTES
Downtime Progress Note for Transcend : [facility]  Follow up Outreach Note   Outreach type: Follow up  Patient name: Mathilda Fabry  : [birth year]  MRN: [id number]    Date/Time of Outreach: 10/9/18     Reason for follow-up:   Continuation of care   Disease specific complaints/issues: Cerebral infarction        Patient progress towards goals set from last contact:   10/9/18 Spoke with pt. verbalized that she continues to drive to all appointment. verbalized that she continues to be independent of all ADL's. will be seeing PCP on the  of this month. Continues to take all meds including Blood thinners as ordered. nurse encourage to keep all appointments. nurse will continue to monitor and provide care as needed. Has patient attended any PCP or specialist follow-up appointments since last contact? What was outcome of appointment? When is next follow-up scheduled? Review medications. Any medication changes since last outreach? Does patient have any questions or issues related to their medications? Home health active? If yes - any issue? Progress? Referrals needed?  (SW, Diabetes education, HH, etc. )    Other issues/Miscellaneous?  (Transportation, access to meals, ability to perform ADLs, adequate caregiver support, etc.)              Next Outreach Scheduled: Week of the      Next Steps/Goals:      Care  completing call: Dandy Marcus

## 2018-10-16 ENCOUNTER — PATIENT OUTREACH (OUTPATIENT)
Dept: CASE MANAGEMENT | Age: 68
End: 2018-10-16

## 2018-10-18 NOTE — PROGRESS NOTES
Downtime Progress Note for Transcend : [facility]  Follow up Outreach Note   Outreach type: Follow up  Patient name: Jacquelyn Kinsey  : [birth year]  MRN: [id number]    Date/Time of Outreach: 10/16/18     Reason for follow-up:   Continuation of care   Disease specific complaints/issues: Cerebral Infarction       Patient progress towards goals set from last contact:   10/16/18 Spoke with pt. verbalized that she continues to be very independent. Verbalized that all the responsibilities at home is on her shoulders. Nurse encourage deep breathing exercises multiple times during the day to help reduce stress   Has patient attended any PCP or specialist follow-up appointments since last contact? What was outcome of appointment? When is next follow-up scheduled? Pt Drives to all appointments. will be seeing PCP on the . continues to be in communication with PCP with needs and concerns. Review medications. Any medication changes since last outreach? Does patient have any questions or issues related to their medications? Taking all meds timely with no issues or concerns. Home health active? If yes - any issue? Progress? None    Referrals needed?  (SW, Diabetes education, HH, etc. ) None   Other issues/Miscellaneous? (Transportation, access to meals, ability to perform ADLs, adequate caregiver support, etc.)   . pt verbalized that she has no need for caregiver support at this time. nurse encourage to call 911 in case of life threatening emergency. pt verbalized that she has [employer] 24hr nurses line. nurse encourage to utilize urgent care clinics. nurse will make this his last phone outreach.            Next Outreach Scheduled: Discharge     Next Steps/Goals:      Care  completing call: Porter Mathew

## 2018-10-23 PROBLEM — F32.A DEPRESSION: Status: ACTIVE | Noted: 2018-10-23

## 2019-01-04 ENCOUNTER — APPOINTMENT (OUTPATIENT)
Dept: CT IMAGING | Age: 69
End: 2019-01-04
Attending: STUDENT IN AN ORGANIZED HEALTH CARE EDUCATION/TRAINING PROGRAM
Payer: COMMERCIAL

## 2019-01-04 ENCOUNTER — APPOINTMENT (OUTPATIENT)
Dept: GENERAL RADIOLOGY | Age: 69
End: 2019-01-04
Attending: STUDENT IN AN ORGANIZED HEALTH CARE EDUCATION/TRAINING PROGRAM
Payer: COMMERCIAL

## 2019-01-04 ENCOUNTER — HOSPITAL ENCOUNTER (EMERGENCY)
Age: 69
Discharge: HOME | End: 2019-01-04
Attending: EMERGENCY MEDICINE
Payer: COMMERCIAL

## 2019-01-04 VITALS
RESPIRATION RATE: 16 BRPM | SYSTOLIC BLOOD PRESSURE: 96 MMHG | TEMPERATURE: 97.8 F | BODY MASS INDEX: 23.39 KG/M2 | HEART RATE: 94 BPM | HEIGHT: 67 IN | DIASTOLIC BLOOD PRESSURE: 51 MMHG | OXYGEN SATURATION: 98 % | WEIGHT: 149 LBS

## 2019-01-04 DIAGNOSIS — S42.201A CLOSED FRACTURE OF PROXIMAL END OF RIGHT HUMERUS, UNSPECIFIED FRACTURE MORPHOLOGY, INITIAL ENCOUNTER: Primary | ICD-10-CM

## 2019-01-04 PROCEDURE — 70450 CT HEAD/BRAIN W/O DYE: CPT

## 2019-01-04 PROCEDURE — L3650 SO 8 ABD RESTRAINT PRE OTS: HCPCS

## 2019-01-04 PROCEDURE — 99284 EMERGENCY DEPT VISIT MOD MDM: CPT | Performed by: EMERGENCY MEDICINE

## 2019-01-04 PROCEDURE — 75810000053 HC SPLINT APPLICATION: Performed by: EMERGENCY MEDICINE

## 2019-01-04 PROCEDURE — A4565 SLINGS: HCPCS

## 2019-01-04 PROCEDURE — 73030 X-RAY EXAM OF SHOULDER: CPT

## 2019-01-04 RX ORDER — OXYCODONE AND ACETAMINOPHEN 5; 325 MG/1; MG/1
1 TABLET ORAL
Qty: 20 TAB | Refills: 0 | Status: SHIPPED | OUTPATIENT
Start: 2019-01-04 | End: 2019-01-23

## 2019-01-05 NOTE — ED NOTES
I have reviewed discharge instructions with the patient and spouse. The patient and spouse verbalized understanding. Patient left ED via Discharge Method: wheelchair to Home with spouse Opportunity for questions and clarification provided. Patient given 1 scripts. To continue your aftercare when you leave the hospital, you may receive an automated call from our care team to check in on how you are doing. This is a free service and part of our promise to provide the best care and service to meet your aftercare needs.  If you have questions, or wish to unsubscribe from this service please call [phone]. Thank you for Choosing our [facility] Emergency Department.

## 2019-01-05 NOTE — DISCHARGE INSTRUCTIONS
Patient Education        Broken Arm: Care Instructions  Your Care Instructions  Fractures can range from a small, hairline crack, to a bone or bones broken into two or more pieces. Your treatment depends on how bad the break is. Your doctor may have put your arm in a splint or cast to allow it to heal or to keep it stable until you see another doctor. It may take weeks or months for your arm to heal. You can help your arm heal with some care at home. You heal best when you take good care of yourself. Eat a variety of healthy foods, and don't smoke. You may have had a sedative to help you relax. You may be unsteady after having sedation. It can take a few hours for the medicine's effects to wear off. Common side effects of sedation include nausea, vomiting, and feeling sleepy or tired. The doctor has checked you carefully, but problems can develop later. If you notice any problems or new symptoms, get medical treatment right away. Follow-up care is a key part of your treatment and safety. Be sure to make and go to all appointments, and call your doctor if you are having problems. It's also a good idea to know your test results and keep a list of the medicines you take. How can you care for yourself at home? · If the doctor gave you a sedative:  ? For 24 hours, don't do anything that requires attention to detail. It takes time for the medicine's effects to completely wear off.  ? For your safety, do not drive or operate any machinery that could be dangerous. Wait until the medicine wears off and you can think clearly and react easily. · Put ice or a cold pack on your arm for 10 to 20 minutes at a time. Try to do this every 1 to 2 hours for the next 3 days (when you are awake). Put a thin cloth between the ice and your cast or splint. Keep the cast or splint dry. · Follow the cast care instructions your doctor gives you. If you have a splint, do not take it off unless your doctor tells you to.   · Be safe with medicines. Take pain medicines exactly as directed. ? If the doctor gave you a prescription medicine for pain, take it as prescribed. ? If you are not taking a prescription pain medicine, ask your doctor if you can take an over-the-counter medicine. · Prop up your arm on pillows when you sit or lie down in the first few days after the injury. Keep the arm higher than the level of your heart. This will help reduce swelling. · Follow instructions for exercises to keep your arm strong. · Wiggle your fingers and wrist often to reduce swelling and stiffness. When should you call for help? Call 911 anytime you think you may need emergency care. For example, call if:    · You are very sleepy and you have trouble waking up.    Call your doctor now or seek immediate medical care if:    · You have new or worse nausea or vomiting.     · You have new or worse pain.     · Your hand or fingers are cool or pale or change color.     · Your cast or splint feels too tight.     · You have tingling, weakness, or numbness in your hand or fingers.    Watch closely for changes in your health, and be sure to contact your doctor if:    · You do not get better as expected.     · You have problems with your cast or splint. Where can you learn more? Go to http://kevin-david.info/. Enter Q614 in the search box to learn more about \"Broken Arm: Care Instructions. \"  Current as of: November 29, 2017  Content Version: 11.8  © 3325-5322 [employer]. Care instructions adapted under license by [employer] (which disclaims liability or warranty for this information). If you have questions about a medical condition or this instruction, always ask your healthcare professional. [place] [address] any warranty or liability for your use of this information.        Xr Shoulder Rt Ap/lat Min 2 V    Result Date: 1/4/2019  THREE-VIEW RIGHT SHOULDER: CLINICAL HISTORY:  Pain after fall with limited range of motion. COMPARISON:  Portable chest of July 9, 2018. FINDINGS:  There is a moderately comminuted fracture of the surgical neck of the humerus extending to the greater tuberosity. The principal distal fragment is displaced approximately 1 cm medially. The scapula, clavicle, and right ribs appear intact as imaged. No persistent radiopaque foreign body is seen. IMPRESSION:  Moderately comminuted and mildly displaced fracture of the surgical neck of the humerus. Ct Head Wo Cont    Result Date: 1/4/2019  NONCONTRAST HEAD CT CLINICAL HISTORY:  Closed head injury from fall. TECHNIQUE:  Axial images were obtained with spiral technique. Radiation dose reduction was achieved using one or all of the following techniques: automated exposure control, weight-based dosing, iterative reconstruction. COMPARISON:  HEAD CT of August 31, 2018 and MRI of September 1, 2018. REPORT:   Standard non contrast head CT demonstrates no definite intracranial mass effect, hemorrhage, or evidence of acute geographic infarction. White matter hypodensities are most consistent with small vessel ischemic disease. Mild atrophy is again noted. Orbits and  paranasal sinuses are clear where imaged. Bone windows demonstrate no definite fracture or destruction. IMPRESSION:     MILD ATROPHY WITH SMALL VESSEL ISCHEMIC DISEASE BUT NO ACUTE INTRACRANIAL ABNORMALITY OR CALVARIAL FRACTURE IDENTIFIED AT NONCONTRAST CT.

## 2019-01-05 NOTE — ED TRIAGE NOTES
Pt states she fell down at [employer] hitting her right shoulder. She believes her shoulder is dislocated. Pt states she does not know if she hit her head but denies LOC. Pt's  states he witnessed the fall and that the pt did hit her head. No visible trauma to the head. Limited ROM in right arm.

## 2019-01-05 NOTE — ED PROVIDER NOTES
Patient is a 81-year-old who is coming in after having a fall. She states she was at [facility] and tripped over something and fell and hit her right shoulder. She has had severe pain in the right shoulder since. Incident occurred just prior to arrival.  No other complaints. She did hit her head as well when she fell. Past Medical History:  
Diagnosis Date  Chronic musculoskeletal pain  CVA (cerebral vascular accident) ([facility] [address] 07/2018  
 right pontine  Cystocele with rectocele H/O  
 Essential hypertension 2/9/2016  GERD (gastroesophageal reflux disease) diet controlled  [facility] 1/9/2015 H/O LFT HIP REPAIR  
 Hip fracture, left ([facility] 1/7/2015  Hyperlipidemia  Hypothyroidism  Impaired mobility and ADLs  Pure hypercholesterolemia 2/9/2016  Type 2 diabetes mellitus without complication ([facility] 4/8/7509 Past Surgical History:  
Procedure Laterality Date  HX COLONOSCOPY    
 HX DILATION AND CURETTAGE    
 HX HIP FRACTURE TX Left  HX MT AND BSO  HX TONSILLECTOMY  HX TUBAL LIGATION Family History:  
Problem Relation Age of Onset  Cancer Mother  Cancer Father  Breast Cancer Neg Hx Social History Socioeconomic History  Marital status:  Spouse name: Not on file  Number of children: Not on file  Years of education: Not on file  Highest education level: Not on file Social Needs  Financial resource strain: Not on file  Food insecurity - worry: Not on file  Food insecurity - inability: Not on file  Transportation needs - medical: Not on file  Transportation needs - non-medical: Not on file Occupational History  Not on file Tobacco Use  Smoking status: Never Smoker  Smokeless tobacco: Never Used Substance and Sexual Activity  Alcohol use: No  
 Drug use: No  
 Sexual activity: Not on file Other Topics Concern  Not on file Social History Narrative  Not on file ALLERGIES: Codeine and Tramadol Review of Systems Constitutional: Negative for chills and fever. Skin: Negative for color change, pallor and wound. Neurological: Negative for weakness and numbness. Vitals:  
 01/04/19 1921 01/04/19 1931 01/04/19 2031 01/04/19 2155 BP: (!) 82/52 (!) 82/52 (!) [id number] Pulse: 98 98 95 94 Resp: 16 16 16 16 Temp: 97.3 °F (36.3 °C) 97.3 °F (36.3 °C) 97.7 °F (36.5 °C) 97.8 °F (36.6 °C) SpO2: 98% 98% 98% 98% Weight: 63.5 kg (140 lb) 67.6 kg (149 lb) Height: 5' 7\" (1.702 m) 5' 7\" (1.702 m) Physical Exam  
Constitutional: She appears well-developed and well-nourished. No distress. HENT:  
Head: Normocephalic and atraumatic. Eyes: Conjunctivae are normal. No scleral icterus. Neck: Normal range of motion. Pulmonary/Chest: Effort normal. No respiratory distress. Musculoskeletal:  
Right shoulder is swollen over the proximal arm. There is no movement of this joint due to pain. Neurological: She is alert. Skin: She is not diaphoretic. Psychiatric: She has a normal mood and affect. Her behavior is normal.  
Nursing note and vitals reviewed. MDM Number of Diagnoses or Management Options Closed fracture of proximal end of right humerus, unspecified fracture morphology, initial encounter:  
Diagnosis management comments: Patient has fracture placed in sling and swath. She was given pain medications and ortho followup. Sofia Mario MD; 1/5/2019 @3:21 AM Voice dictation software was used during the making of this note. This software is not perfect and grammatical and other typographical errors may be present. This note has not been proofread for errors. 
=================================================================== Amount and/or Complexity of Data Reviewed Tests in the radiology section of CPT®: ordered and reviewed (XR SHOULDER RT AP/LAT MIN 2 V Final Result IMPRESSION:  Moderately comminuted and mildly displaced fracture of the surgical 
  neck of the humerus. CT HEAD WO CONT Final Result IMPRESSION:     MILD ATROPHY WITH SMALL VESSEL ISCHEMIC DISEASE BUT NO ACUTE INTRACRANIAL ABNORMALITY OR CALVARIAL FRACTURE IDENTIFIED AT NONCONTRAST CT. ) 
 
 
  
 
Procedures

## 2019-01-24 PROBLEM — S42.209A CLOSED FRACTURE OF PROXIMAL END OF HUMERUS: Status: ACTIVE | Noted: 2019-01-24

## 2019-02-25 ENCOUNTER — HOSPITAL ENCOUNTER (OUTPATIENT)
Dept: PHYSICAL THERAPY | Age: 69
Discharge: HOME | End: 2019-02-25
Payer: COMMERCIAL

## 2019-02-25 PROCEDURE — 97162 PT EVAL MOD COMPLEX 30 MIN: CPT

## 2019-02-25 NOTE — THERAPY EVALUATION
Alistair Walters  : 1950  Payor: Johnie Doan / Plan: [employer] H[employer] MEDICARE ADVANTAGE / Product Type: Managed Care Medicare /  [address]  at [facility]  [place] [address], [address], [address]  [phone]   [phone]              OUTPATIENT PHYSICAL THERAPY:Initial Assessment 2019    ICD-10: Treatment Diagnosis: Pain in right shoulder (M25.511); Stiffness of right shoulder, not elsewhere classified (M25.611); Muscle weakness (generalized) (M62.81)  Precautions/Allergies:   Codeine and Tramadol   Fall Risk Score: 6 (? 5 = High Risk)  MD Orders: Eval and Treat  MEDICAL/REFERRING DIAGNOSIS:  Other displaced fracture of upper end of right humerus, initial encounter for closed fracture [S42.291A]   DATE OF ONSET: 19  REFERRING PHYSICIAN: Max Serna MD  RETURN PHYSICIAN APPOINTMENT: TBD by patient      INITIAL ASSESSMENT:  Ms. Alistair Walters has attended 1 physical therapy session including the initial evaluation. Alistair Walters presents to PT with decreased R shoulder ROM, decreased B UE strength, increased R UE pain, decreased tolerance for functional activities, and decreased ability to participate in ADLs, all that have onset after she suffered a closed displaced fracture of the superior R humerus on 19 when she tripped over a piece of equipment at the gym. According to her responses on the Disabilities of the Arm, Shoulder, and Hand (DASH) Questionnaire- Quick Version, Diamante Huang is 73% limited by her shoulder pain and dysfunction in her ability to fully participate in ADLs. Alistair Walters reports independence with ADLs at her baseline but is now reporting difficulty with activities such as dressing, bathing, grooming, and driving. Alistair Walters reports she will be unable to follow through with her established POC, as she is leaving for a 3 month trip on 19.  Recommending skilled PT: manual therapeutic techniques (as appropriate), therapeutic exercises and activities, a comprehensive home exercise program, and a combination of the interventions as listed below to address Juan Carlos Huang's current impairments. Alistair Walters will benefit from skilled PT (medically necessary) to address the deficits listed above that are affecting her participation in basic ADLs and in her overall functional tolerance. PROBLEM LIST (Impacting functional limitations):  1. Decreased Strength  2. Decreased ADL/Functional Activities  3. Decreased Transfer Abilities  4. Increased Pain  5. Decreased Activity Tolerance  6. Increased Fatigue  7. Decreased Flexibility/Joint Mobility  8. Decreased [place] with Home Exercise Program INTERVENTIONS PLANNED:  1. Balance Exercise  2. Bed Mobility  3. Cold  4. Heat  5. Cryotherapy  6. Family Education  7. Gait Training  8. Home Exercise Program (HEP)  9. Manual Therapy  10. Neuromuscular Re-education/Strengthening  11. Range of Motion (ROM)  12. Therapeutic Activites  13. Therapeutic Exercise/Strengthening  14. Transfer Training  15. Ultrasound  16. Electrical Stimulation  17. Aquatic Therapy   TREATMENT PLAN:  Effective Dates: 2/25/2019 TO 5/27/2019 (90 days). Frequency/Duration: 2 times a week for 90 Days (patient report she only plans to attend therapy for 1 month, as she is leaving for a 3 month trip at the end of March 2019)  GOALS: (Goals have been discussed and agreed upon with patient.)    SHORT-TERM FUNCTIONAL GOALS: Time Frame: 6 weeks  1. Alistair Walters will report <=5/10 pain with don/doffing clothing as well as minimal/no difficulty. 2. Alistair Walters will demonstrate improvement in active R shoulder flexion to >=90 degrees to increase UE function and participation in ADLs. 3. Alistair Walters will demonstrate improvement in active R shoulder abduction to >=90 degrees to increase UE function and participation in ADLs.   [address] will show a >= 8 point decrease on the DASH in order to show an increase in upper extremity function. [address] Missy will be compliant in all HEP. DISCHARGE GOALS: Time Frame: 12 weeks    1. Meryle Milliner will demonstrate >=75% of full AROM of the R UE in order to return to full, independent functional mobility. 2. Meryle Milliner will show a >= 15 point decrease on the DASH in order to show an increase in upper extremity function. 3. Meryle Milliner will report doing hair/bathing without difficulty and <=3/10 pain in the R UE in order to be independent with ADL's and return to her PLOF. Onur Louis will be independent in all advanced HEP. [address] Miss[address] will improve her R UE MMT scores by one grade from those at her initial evaluation in order to increase her tolerance for participation in functional activities. Rehabilitation Potential For Stated Goals: Good  Regarding Mmii Huang's therapy, I certify that the treatment plan above will be carried out by a therapist or under their direction. Thank you for this referral,  Daniela White DPT     Referring Physician Signature: Long Navarrete MD              Date                    HISTORY:   History of Present Injury/Illness (Reason for Referral):  Patient presents with a closed, displaced fracture of the R upper humerus that occurred on 01/04/19 after the patient tripped over a piece of equipment at [facility], [place]. Patient did not receive surgical interventions and per last orthopaedic note, her most recent x-rays show a healing fracture of the R proximal humerus. X-ray images can be found in patient's physical chart. Patient states she no longer has to wear a sling. She reports limitations in all overhead activities, dressing and bathing, grooming, lifting heavy objects, and driving. She reports being able to sleep in her bed now, but initially had to sleep in the recliner. She is not taking any pain medication and she rates her R shoulder pain at 6/10 today. Past Medical History/Comorbidities:   Ms. Janeann Aschoff  has a past medical history of Chronic musculoskeletal pain, [facility] (07/2018), Cystocele with rectocele, Essential hypertension (2/9/2016), GERD (gastroesophageal reflux disease), Hip fracture requiring operative repair ([facility] (1/9/2015), Hip fracture, left ([facility] (1/7/2015), Hyperlipidemia, Hypothyroidism, Impaired mobility and ADLs, Pure hypercholesterolemia (2/9/2016), and Type 2 diabetes mellitus without complication ([facility] (3/5/0048). Ms. Janeann Aschoff  has a past surgical history that includes hx tonsillectomy; hx tubal ligation; hx dilation and curettage; hx colonoscopy; hx magali and bso; and hx hip fracture tx (Left). Social History/Living Environment:    Patient lives with her  and reported family in a one story home with 0 steps to enter. Prior Level of Function/Work/Activity:  Independent  Dominant Side:         RIGHT  Current Medications:    Current Outpatient Medications:     glipiZIDE-metFORMIN (METAGLIP) 5-500 mg per tablet, Take 1 Tab by mouth Before breakfast and dinner., Disp: 180 Tab, Rfl: 3    cyclobenzaprine (FLEXERIL) 10 mg tablet, TAKE 1 TABLET EVERY DAY, Disp: 90 Tab, Rfl: 0    gabapentin (NEURONTIN) 300 mg capsule, Take 3 caps po tid, Disp: 270 Cap, Rfl: 3    HYDROcodone-acetaminophen (NORCO) 7.5-325 mg per tablet, Take 2 Tabs by mouth every six (6) hours as needed for Pain (fracture arm). , Disp: , Rfl:     insulin NPH/insulin regular (NOVOLIN 70/30, HUMULIN 70/30) 100 unit/mL (70-30) injection, 20 units QAC breakfast and QAC supper, Disp: 10 mL, Rfl: 6    atorvastatin (LIPITOR) 80 mg tablet, Take 1 Tab by mouth daily. , Disp: 90 Tab, Rfl: 3    ezetimibe (ZETIA) 10 mg tablet, Take 1 Tab by mouth daily. , Disp: 90 Tab, Rfl: 3    levothyroxine (SYNTHROID) 100 mcg tablet, TAKE ONE TABLET BY MOUTH ONCE DAILY BEFORE BREAKFAST FOR HYPOTHYROIDISM, Disp: 90 Tab, Rfl: 3    clopidogrel (PLAVIX) 75 mg tab, Take 1 Tab by mouth daily. , Disp: 90 Tab, Rfl: 3    insulin lispro (HUMALOG) 100 unit/mL injection, Sliding scale as above for blood sugar levels before meals and at bedtime. For Blood Sugar (mg/dL) of:    Less than 150 =   0 units          150 -199 =   2 units 200 -249 =   4 units 250 -299 =   6 units 300 -349 =   8 units, Disp: 1 Vial, Rfl: 0    Insulin Needles, Disposable, (BD ULTRA-FINE SHORT PEN NEEDLE) 31 gauge x 5/16\" ndle, USE  TWICE DAILY AS DIRECTED WITH  INSULIN  PENS, Disp: 100 Pen Needle, Rfl: 17    glucose blood VI test strips (ONETOUCH ULTRA BLUE TEST STRIP) strip, USE TO TEST BLOOD SUGARS THREE TIMES A DAY, Disp: 300 Strip, Rfl: 11    ciprofloxacin HCl (CIPRO) 500 mg tablet, Take 1 Tab by mouth two (2) times a day., Disp: 10 Tab, Rfl: 1    FLUoxetine (PROZAC) 40 mg capsule, Take 1 Cap by mouth daily. , Disp: 90 Cap, Rfl: 3    cinnamon bark (CINNAMON) 500 mg cap, Take  by mouth., Disp: , Rfl:     chromium picolinate 400 mcg tab, Take  by mouth., Disp: , Rfl:     B.infantis-B.ani-B.long-B.bifi (PROBIOTIC 4X) 10-15 mg TbEC, Take 1 Tab by mouth daily. , Disp: , Rfl:     aspirin delayed-release 81 mg tablet, Take 1 Tab by mouth daily. , Disp: 30 Tab, Rfl: 0    vit a,c & e-lutein-minerals (VISION FORMULA, WITH LUTEIN,) tablet, Take 1 Tab by mouth daily. , Disp: , Rfl:     magnesium 250 mg tab, Take 250 mg by mouth daily. , Disp: , Rfl:     Alpha Lipoic Acid 600 mg cap, Take 200 mg by mouth daily. , Disp: , Rfl:     cyanocobalamin 1,000 mcg tablet, Take 1,000 mcg by mouth daily. , Disp: , Rfl:     ONETOUCH ULTRA TEST strip, TEST BLOOD SUGAR TWICE A   DAY, Disp: 200 Strip, Rfl: 2    CALCIUM CARB/VIT D3/MINERALS (CALCIUM-VITAMIN D PO), Take 1 Tab by mouth daily. , Disp: , Rfl:     MULTIVITAMIN PO, Take 1 Tab by mouth daily. , Disp: , Rfl:      Date Last Reviewed:  2/25/2019     Ambulatory/Rehab Services H2 Model Falls Risk Assessment    Risk Factors:       (5)  History of Recent Falls [w/in 3 months] Ability to Rise from Chair:       (1)  Pushes up, successful in one attempt    Falls Prevention Plan:       No modifications necessary   Total: (5 or greater = High Risk): 6    ©2010 [employer] States Patent #[id number]. Federal Law prohibits the replication, distribution or use without written permission from [facility] of [employer]        Number of Personal Factors/Comorbidities that affect the Plan of Care: 1-2: MODERATE COMPLEXITY   EXAMINATION:   Observation/Orthostatic Postural Assessment:          Assessed @ Initial Visit: Patient sits with forward head and rounded shoulders which indicate tight anterior chest musculature, upper trapezius, and levator scapula and weak posterior scapula musculature and deep cervical flexors. Patient displays decreased core motor control indicating weak core and low back musculature. No bruising or discoloration observed. Palpation:         Patient reports pain and tenderness to palpation of the R shoulder girdle and upper arm near her biceps muscle belly.    ROM:    AROM/PROM         Joint: Eval Date: 02/25/19  Re-Assess Date:  Re-Assess Date:    ACTIVE ROM (standing) RIGHT LEFT RIGHT LEFT RIGHT LEFT   Shoulder Flexion 75 degrees WNL       Shoulder Abduction 54 degrees WNL       Shoulder Internal Rotation (Apley's) L4; 20 degrees in 15-20 degrees of abduction WNL; T8       Shoulder External Rotation (Apley's) 24 degrees  in 15-20 degrees of abduction WNL       Elbow ROM WNL WNL       PASSIVE ROM (supine)         Shoulder Flexion Patient unable to fully relax        Shoulder Abduction Patient unable to fully relax        Shoulder Internal Rotation Patient unable to fully relax        Shoulder External Rotation Patient unable to fully relax                   Strength:    Joint: Eval Date: 02/25/19  Re-Assess Date:  Re-Assess Date:     RIGHT LEFT RIGHT LEFT RIGHT LEFT   Shoulder Flexion 3-/5 4/5       Shoulder Abduction 3-/5 4/5 Shoulder Internal Rotation 3-/5 4-/5       Shoulder External Rotation 3-/5 4-/5       Elbow Flexion 4-/5 4/5       Elbow Extension 3+/5 4/5                    Special Tests: Special tests not assessed, secondary to patient's diagnosis and fracture R proximal humerus confirmed by x-ray imaging. Neurological Screen: Assessed @ Initial Visit    Radiating symptoms? Patient denies any radicular symptoms into the B UEs   Functional Mobility:  Assessed @ Initial Visit: Patient is limited in her ability to participate in ADLs and in her overall functional mobility. Body Structures Involved:  1. Bones  2. Joints  3. Muscles  4. Ligaments Body Functions Affected:  1. Sensory/Pain  2. Neuromusculoskeletal  3. Movement Related Activities and Participation Affected:  1. General Tasks and Demands  2. Mobility  3. Self Care  4. Domestic Life  5. Community, Social and [place]   Number of elements that affect the Plan of Care: 4+: HIGH COMPLEXITY   CLINICAL PRESENTATION:   Presentation: Evolving clinical presentation with changing clinical characteristics: MODERATE COMPLEXITY   CLINICAL DECISION MAKING:   Outcome Measure: Tool Used: Disabilities of the Arm, Shoulder and Hand (DASH) Questionnaire - Quick Version  Score:  Initial: 43/55 (02/25/19) Most Recent: X/55 (Date: -- )   Interpretation of Score: The DASH is designed to measure the activities of daily living in person's with upper extremity dysfunction or pain. Each section is scored on a 1-5 scale, 5 representing the greatest disability. The scores of each section are added together for a total score of 55. Medical Necessity:   · Skilled intervention continues to be required due to deficits and impairments seen upon initial evaluation affecting patient's participation in ADLs and functional tasks.   ·   Reason for Services/Other Comments:  · Patient continues to require skilled intervention due to deficits and impairments seen upon initial evaluation affecting patient's participation in ADLs and functional tasks. Use of outcome tool(s) and clinical judgement create a POC that gives a: Questionable prediction of patient's progress: MODERATE COMPLEXITY   TREATMENT:   (In addition to Assessment/Re-Assessment sessions the following treatments were rendered)    Pre-treatment subjective comments/concerns: Initial evaluation, see assessment above. THERAPEUTIC EXERCISE: (0 minutes):  Exercises per grid below to improve mobility, strength and balance. Required minimal visual and verbal cues to promote proper body alignment, promote proper body posture and promote proper body mechanics. Progressed resistance, range and repetitions as indicated. Date:   Date:   Date:     Activity/Exercise Parameters Parameters Parameters                                               MANUAL THERAPY: (0 minutes): Joint mobilization, Soft tissue mobilization and Manipulation was utilized and necessary because of the patient's restricted joint motion, painful spasm and restricted motion of soft tissue. Done to improve soft tissue tone and elasticity  as well as promote proper joint movement in order to improve overall movement quality. (Used abbreviations: MET - muscle energy technique; PNF - proprioceptive neuromuscular facilitation; NMR - neuromuscular re-education; AP - anterior to posterior; PA - posterior to anterior)    MODALITIES: (0 minutes):  None today. Treatment/Session Assessment: Patient verbalized and demonstrated understanding of PT/POC. Patient provided with and educated on HEP including AAROM supine shoulder flexion and abduction with wand and pendulums. Patient provided with written instructions and pictures for personal use. · Pain/ Symptoms: Initial:   6/10 R UE Post Session:  6/10 R UE ·   Compliance with Program/Exercises: Will assess as treatment progresses. · Recommendations/Intent for next treatment session:  \"Next visit will focus on advancements to more challenging activities\".   Total Treatment Duration:  PT Patient Time In/Time Out  Time In: 0930  Time Out: [facility], [place]

## 2019-02-28 ENCOUNTER — HOSPITAL ENCOUNTER (OUTPATIENT)
Dept: PHYSICAL THERAPY | Age: 69
Discharge: HOME | End: 2019-02-28
Payer: COMMERCIAL

## 2019-02-28 PROCEDURE — 97140 MANUAL THERAPY 1/> REGIONS: CPT

## 2019-02-28 PROCEDURE — 97110 THERAPEUTIC EXERCISES: CPT

## 2019-02-28 NOTE — PROGRESS NOTES
Gomez Carey  : 1950  Payor: Shane Zapien / Plan: KELSY PATEL O MEDICARE ADVANTAGE / Product Type: Managed Care Medicare /  [facility] [place]  at [facility]  [place] [address], [place] [address] [facility]  [phone]   [phone]              OUTPATIENT PHYSICAL THERAPY:Daily Note 2019    ICD-10: Treatment Diagnosis: Pain in right shoulder (M25.511); Stiffness of right shoulder, not elsewhere classified (M25.611); Muscle weakness (generalized) (M62.81)  Precautions/Allergies:   Codeine and Tramadol   Fall Risk Score: 6 (? 5 = High Risk)  MD Orders: Eval and Treat  MEDICAL/REFERRING DIAGNOSIS:  Other displaced fracture of upper end of right humerus, initial encounter for closed fracture [S42.291A]   DATE OF ONSET: 19  REFERRING PHYSICIAN: Yajaira Hernandez MD  RETURN PHYSICIAN APPOINTMENT: TBD by patient      INITIAL ASSESSMENT:  Ms. Gomez Carey has attended 1 physical therapy session including the initial evaluation. Gomez Carey presents to PT with decreased R shoulder ROM, decreased B UE strength, increased R UE pain, decreased tolerance for functional activities, and decreased ability to participate in ADLs, all that have onset after she suffered a closed displaced fracture of the superior R humerus on 19 when she tripped over a piece of equipment at the gym. According to her responses on the Disabilities of the Arm, Shoulder, and Hand (DASH) Questionnaire- Quick Version, Yesica Huang is 73% limited by her shoulder pain and dysfunction in her ability to fully participate in ADLs. Gomez Carey reports independence with ADLs at her baseline but is now reporting difficulty with activities such as dressing, bathing, grooming, and driving. Gomez Carey reports she will be unable to follow through with her established POC, as she is leaving for a 3 month trip on 19.  Recommending skilled PT: manual therapeutic techniques (as appropriate), therapeutic exercises and activities, a comprehensive home exercise program, and a combination of the interventions as listed below to address Angela Huang's current impairments. Gissell Wong will benefit from skilled PT (medically necessary) to address the deficits listed above that are affecting her participation in basic ADLs and in her overall functional tolerance. PROBLEM LIST (Impacting functional limitations):  1. Decreased Strength  2. Decreased ADL/Functional Activities  3. Decreased Transfer Abilities  4. Increased Pain  5. Decreased Activity Tolerance  6. Increased Fatigue  7. Decreased Flexibility/Joint Mobility  8. Decreased [place] with Home Exercise Program INTERVENTIONS PLANNED:  1. Balance Exercise  2. Bed Mobility  3. Cold  4. Heat  5. Cryotherapy  6. Family Education  7. Gait Training  8. Home Exercise Program (HEP)  9. Manual Therapy  10. Neuromuscular Re-education/Strengthening  11. Range of Motion (ROM)  12. Therapeutic Activites  13. Therapeutic Exercise/Strengthening  14. Transfer Training  15. Ultrasound  16. Electrical Stimulation  17. Aquatic Therapy   TREATMENT PLAN:  Effective Dates: 2/25/2019 TO 5/27/2019 (90 days). Frequency/Duration: 2 times a week for 90 Days (patient report she only plans to attend therapy for 1 month, as she is leaving for a 3 month trip at the end of March 2019)  GOALS: (Goals have been discussed and agreed upon with patient.)    SHORT-TERM FUNCTIONAL GOALS: Time Frame: 6 weeks  1. Gissell Wong will report <=5/10 pain with don/doffing clothing as well as minimal/no difficulty. 2. Gissell Wong will demonstrate improvement in active R shoulder flexion to >=90 degrees to increase UE function and participation in ADLs. 3. Gissell Wong will demonstrate improvement in active R shoulder abduction to >=90 degrees to increase UE function and participation in ADLs.   [address] will show a >= 8 point decrease on the DASH in order to show an increase in upper extremity function. [address] [place] [address] Missy will be compliant in all HEP. DISCHARGE GOALS: Time Frame: 12 weeks    1. Tavon Lorenzana will demonstrate >=75% of full AROM of the R UE in order to return to full, independent functional mobility. 2. Tavon Lorenzana will show a >= 15 point decrease on the DASH in order to show an increase in upper extremity function. 3. Tavon Lorenzana will report doing hair/bathing without difficulty and <=3/10 pain in the R UE in order to be independent with ADL's and return to her PLOF. Onur Louis will be independent in all advanced HEP. Onur [facility] Missy will improve her R UE MMT scores by one grade from those at her initial evaluation in order to increase her tolerance for participation in functional activities. Rehabilitation Potential For Stated Goals: Good  Regarding Eduardo Huang's therapy, I certify that the treatment plan above will be carried out by a therapist or under their direction. Thank you for this referral,  Alysia Eldridge DPT                 HISTORY:   History of Present Injury/Illness (Reason for Referral):  Patient presents with a closed, displaced fracture of the R upper humerus that occurred on 01/04/19 after the patient tripped over a piece of equipment at [facility]. Patient did not receive surgical interventions and per last orthopaedic note, her most recent x-rays show a healing fracture of the R proximal humerus. X-ray images can be found in patient's physical chart. Patient states she no longer has to wear a sling. She reports limitations in all overhead activities, dressing and bathing, grooming, lifting heavy objects, and driving. She reports being able to sleep in her bed now, but initially had to sleep in the recliner. She is not taking any pain medication and she rates her R shoulder pain at 6/10 today.    Past Medical History/Comorbidities:   Ms. Diamante Fuentes  has a past medical history of Chronic musculoskeletal pain, CVA (cerebral vascular accident) ([facility] (07/2018), Cystocele with rectocele, Essential hypertension (2/9/2016), GERD (gastroesophageal reflux disease), Hip fracture requiring operative repair ([facility] (1/9/2015), Hip fracture, left ([facility] (1/7/2015), Hyperlipidemia, Hypothyroidism, Impaired mobility and ADLs, Pure hypercholesterolemia (2/9/2016), and Type 2 diabetes mellitus without complication ([facility] (7/9/7815). Ms. Shelley Diaz  has a past surgical history that includes hx tonsillectomy; hx tubal ligation; hx dilation and curettage; hx colonoscopy; hx magali and bso; and hx hip fracture tx (Left). Social History/Living Environment:    Patient lives with her  and reported family in a one story home with 0 steps to enter. Prior Level of Function/Work/Activity:  Independent  Dominant Side:         RIGHT  Current Medications:    Current Outpatient Medications:     glipiZIDE-metFORMIN (METAGLIP) 5-500 mg per tablet, Take 1 Tab by mouth Before breakfast and dinner., Disp: 180 Tab, Rfl: 3    cyclobenzaprine (FLEXERIL) 10 mg tablet, TAKE 1 TABLET EVERY DAY, Disp: 90 Tab, Rfl: 0    gabapentin (NEURONTIN) 300 mg capsule, Take 3 caps po tid, Disp: 270 Cap, Rfl: 3    HYDROcodone-acetaminophen (NORCO) 7.5-325 mg per tablet, Take 2 Tabs by mouth every six (6) hours as needed for Pain (fracture arm). , Disp: , Rfl:     insulin NPH/insulin regular (NOVOLIN 70/30, HUMULIN 70/30) 100 unit/mL (70-30) injection, 20 units QAC breakfast and QAC supper, Disp: 10 mL, Rfl: 6    atorvastatin (LIPITOR) 80 mg tablet, Take 1 Tab by mouth daily. , Disp: 90 Tab, Rfl: 3    ezetimibe (ZETIA) 10 mg tablet, Take 1 Tab by mouth daily. , Disp: 90 Tab, Rfl: 3    levothyroxine (SYNTHROID) 100 mcg tablet, TAKE ONE TABLET BY MOUTH ONCE DAILY BEFORE BREAKFAST FOR HYPOTHYROIDISM, Disp: 90 Tab, Rfl: 3    clopidogrel (PLAVIX) 75 mg tab, Take 1 Tab by mouth daily. , Disp: 90 Tab, Rfl: 3    insulin lispro (HUMALOG) 100 unit/mL injection, Sliding scale as above for blood sugar levels before meals and at bedtime. For Blood Sugar (mg/dL) of:    Less than 150 =   0 units          150 -199 =   2 units 200 -249 =   4 units 250 -299 =   6 units 300 -349 =   8 units, Disp: 1 Vial, Rfl: 0    Insulin Needles, Disposable, (BD ULTRA-FINE SHORT PEN NEEDLE) 31 gauge x 5/16\" ndle, USE  TWICE DAILY AS DIRECTED WITH  INSULIN  PENS, Disp: 100 Pen Needle, Rfl: 17    glucose blood VI test strips (ONETOUCH ULTRA BLUE TEST STRIP) strip, USE TO TEST BLOOD SUGARS THREE TIMES A DAY, Disp: 300 Strip, Rfl: 11    ciprofloxacin HCl (CIPRO) 500 mg tablet, Take 1 Tab by mouth two (2) times a day., Disp: 10 Tab, Rfl: 1    FLUoxetine (PROZAC) 40 mg capsule, Take 1 Cap by mouth daily. , Disp: 90 Cap, Rfl: 3    cinnamon bark (CINNAMON) 500 mg cap, Take  by mouth., Disp: , Rfl:     chromium picolinate 400 mcg tab, Take  by mouth., Disp: , Rfl:     B.infantis-B.ani-B.long-B.bifi (PROBIOTIC 4X) 10-15 mg TbEC, Take 1 Tab by mouth daily. , Disp: , Rfl:     aspirin delayed-release 81 mg tablet, Take 1 Tab by mouth daily. , Disp: 30 Tab, Rfl: 0    vit a,c & e-lutein-minerals (VISION FORMULA, WITH LUTEIN,) tablet, Take 1 Tab by mouth daily. , Disp: , Rfl:     magnesium 250 mg tab, Take 250 mg by mouth daily. , Disp: , Rfl:     Alpha Lipoic Acid 600 mg cap, Take 200 mg by mouth daily. , Disp: , Rfl:     cyanocobalamin 1,000 mcg tablet, Take 1,000 mcg by mouth daily. , Disp: , Rfl:     ONETOUCH ULTRA TEST strip, TEST BLOOD SUGAR TWICE A   DAY, Disp: 200 Strip, Rfl: 2    CALCIUM CARB/VIT D3/MINERALS (CALCIUM-VITAMIN D PO), Take 1 Tab by mouth daily. , Disp: , Rfl:     MULTIVITAMIN PO, Take 1 Tab by mouth daily. , Disp: , Rfl:      Date Last Reviewed:  2/28/2019     Ambulatory/Rehab Services H2 Model Falls Risk Assessment    Risk Factors:       (5)  History of Recent Falls [w/in 3 months] Ability to Rise from Chair:       (1)  Pushes up, successful in one attempt Falls Prevention Plan:       No modifications necessary   Total: (5 or greater = High Risk): 6    ©2010 [facility] [employer]. All Rights Reserved. [place] States Patent #[id number]. Federal Law prohibits the replication, distribution or use without written permission from [facility]        Number of Personal Factors/Comorbidities that affect the Plan of Care: 1-2: MODERATE COMPLEXITY   EXAMINATION:   Observation/Orthostatic Postural Assessment:          Assessed @ Initial Visit: Patient sits with forward head and rounded shoulders which indicate tight anterior chest musculature, upper trapezius, and levator scapula and weak posterior scapula musculature and deep cervical flexors. Patient displays decreased core motor control indicating weak core and low back musculature. No bruising or discoloration observed. Palpation:         Patient reports pain and tenderness to palpation of the R shoulder girdle and upper arm near her biceps muscle belly.    ROM:    AROM/PROM         Joint: Eval Date: 02/25/19  Re-Assess Date:  Re-Assess Date:    ACTIVE ROM (standing) RIGHT LEFT RIGHT LEFT RIGHT LEFT   Shoulder Flexion 75 degrees WNL       Shoulder Abduction 54 degrees WNL       Shoulder Internal Rotation (Apley's) L4; 20 degrees in 15-20 degrees of abduction WNL; T8       Shoulder External Rotation (Apley's) 24 degrees  in 15-20 degrees of abduction WNL       Elbow ROM WNL WNL       PASSIVE ROM (supine)         Shoulder Flexion Patient unable to fully relax        Shoulder Abduction Patient unable to fully relax        Shoulder Internal Rotation Patient unable to fully relax        Shoulder External Rotation Patient unable to fully relax                   Strength:    Joint: Eval Date: 02/25/19  Re-Assess Date:  Re-Assess Date:     RIGHT LEFT RIGHT LEFT RIGHT LEFT   Shoulder Flexion 3-/5 4/5       Shoulder Abduction 3-/5 4/5       Shoulder Internal Rotation 3-/5 4-/5       Shoulder External Rotation 3-/5 4-/5       Elbow Flexion 4-/5 4/5       Elbow Extension 3+/5 4/5                    Special Tests: Special tests not assessed, secondary to patient's diagnosis and fracture R proximal humerus confirmed by x-ray imaging. Neurological Screen: Assessed @ Initial Visit    Radiating symptoms? Patient denies any radicular symptoms into the B UEs   Functional Mobility:  Assessed @ Initial Visit: Patient is limited in her ability to participate in ADLs and in her overall functional mobility. Body Structures Involved:  1. Bones  2. Joints  3. Muscles  4. Ligaments Body Functions Affected:  1. Sensory/Pain  2. Neuromusculoskeletal  3. Movement Related Activities and Participation Affected:  1. General Tasks and Demands  2. Mobility  3. Self Care  4. Domestic Life  5. Community, Social and [place]   Number of elements that affect the Plan of Care: 4+: HIGH COMPLEXITY   CLINICAL PRESENTATION:   Presentation: Evolving clinical presentation with changing clinical characteristics: MODERATE COMPLEXITY   CLINICAL DECISION MAKING:   Outcome Measure: Tool Used: Disabilities of the Arm, Shoulder and Hand (DASH) Questionnaire - Quick Version  Score:  Initial: 43/55 (02/25/19) Most Recent: X/55 (Date: -- )   Interpretation of Score: The DASH is designed to measure the activities of daily living in person's with upper extremity dysfunction or pain. Each section is scored on a 1-5 scale, 5 representing the greatest disability. The scores of each section are added together for a total score of 55. Medical Necessity:   · Skilled intervention continues to be required due to deficits and impairments seen upon initial evaluation affecting patient's participation in ADLs and functional tasks. ·   Reason for Services/Other Comments:  · Patient continues to require skilled intervention due to deficits and impairments seen upon initial evaluation affecting patient's participation in ADLs and functional tasks.    Use of outcome tool(s) and clinical judgement create a POC that gives a: Questionable prediction of patient's progress: MODERATE COMPLEXITY   TREATMENT:   (In addition to Assessment/Re-Assessment sessions the following treatments were rendered)    Pre-treatment subjective comments/concerns: Patient reports 3/10 pain today in her R UE. [facility] body with no trouble. Patient states she has been trying to put clothes on that require her to put her arm in an arm hole so that she can work on her arm motion. THERAPEUTIC EXERCISE: (35 minutes):  Exercises per grid below to improve mobility, strength and balance. Required minimal visual and verbal cues to promote proper body alignment, promote proper body posture and promote proper body mechanics. Progressed resistance, range and repetitions as indicated. Date:  02/28/19 Date:   Date:     Activity/Exercise Parameters Parameters Parameters   UBE X 8 minutes (4 forward, 4 backward)  Level 2.0     Pulleys 2 x 10 reps R UE flexion with 5 second hold at end range    2 x 10 reps R UE abduction with 5 second hold at end range     AAROM shoulder IR/ER 2 x 10 reps R UE with 5 second hold at end range; R UE supported on foam roll and therapist hand to prevent compensatory movment                               MANUAL THERAPY: (10 minutes): Joint mobilization, Soft tissue mobilization and Manipulation was utilized and necessary because of the patient's restricted joint motion, painful spasm and restricted motion of soft tissue. Done to improve soft tissue tone and elasticity  as well as promote proper joint movement in order to improve overall movement quality. (Used abbreviations: MET - muscle energy technique; PNF - proprioceptive neuromuscular facilitation; NMR - neuromuscular re-education; AP - anterior to posterior; PA - posterior to anterior).  Patient positioned in supine with knees supported for PROM to the R UE into flexion, abduction, and ER to tolerance. Patient obtained around 100 degrees flexion/scaption and 80 degrees abduction. MODALITIES: (0 minutes):  None today. Treatment/Session Assessment: Patient tolerated treatment well and denied any increases in pain throughout. Patient demonstrated good improvements in ROM today. Patient provided with and educated on HEP additions including flexion and abduction pulleys. Patient provided with written instructions and pictures for personal use. · Pain/ Symptoms: Initial:   3/10 R UE Post Session:  3/10 R UE ·   Compliance with Program/Exercises: Will assess as treatment progresses. · Recommendations/Intent for next treatment session: \"Next visit will focus on advancements to more challenging activities\".   Total Treatment Duration:  PT Patient Time In/Time Out  Time In: 1010  Time Out: [address] [facility], [facility]

## 2019-03-05 ENCOUNTER — HOSPITAL ENCOUNTER (OUTPATIENT)
Dept: PHYSICAL THERAPY | Age: 69
Discharge: HOME | End: 2019-03-05
Payer: COMMERCIAL

## 2019-03-05 PROCEDURE — 97110 THERAPEUTIC EXERCISES: CPT

## 2019-03-05 NOTE — PROGRESS NOTES
Oralia Clayton  : 1950  Payor: Margarita Trudy / Plan: KELSY PATEL O MEDICARE ADVANTAGE / Product Type: Managed Care Medicare /  [address]  at [facility]  [place] [address], [place] [address] [facility]  [phone]   [phone]              OUTPATIENT PHYSICAL THERAPY:Daily Note 3/5/2019    ICD-10: Treatment Diagnosis: Pain in right shoulder (M25.511); Stiffness of right shoulder, not elsewhere classified (M25.611); Muscle weakness (generalized) (M62.81)  Precautions/Allergies:   Codeine and Tramadol   Fall Risk Score: 6 (? 5 = High Risk)  MD Orders: Eval and Treat  MEDICAL/REFERRING DIAGNOSIS:  Other displaced fracture of upper end of right humerus, initial encounter for closed fracture [S42.291A]   DATE OF ONSET: 19  REFERRING PHYSICIAN: Rajesh Sanchez MD  RETURN PHYSICIAN APPOINTMENT: TBD by patient      INITIAL ASSESSMENT:  Ms. Oralia Clayton has attended 1 physical therapy session including the initial evaluation. Oralia Clayton presents to PT with decreased R shoulder ROM, decreased B UE strength, increased R UE pain, decreased tolerance for functional activities, and decreased ability to participate in ADLs, all that have onset after she suffered a closed displaced fracture of the superior R humerus on 19 when she tripped over a piece of equipment at the gym. According to her responses on the Disabilities of the Arm, Shoulder, and Hand (DASH) Questionnaire- Quick Version, Jose Huang is 73% limited by her shoulder pain and dysfunction in her ability to fully participate in ADLs. Oralia Clayton reports independence with ADLs at her baseline but is now reporting difficulty with activities such as dressing, bathing, grooming, and driving. Oralia Clayton reports she will be unable to follow through with her established POC, as she is leaving for a 3 month trip on 19.  Recommending skilled PT: manual therapeutic techniques (as appropriate), therapeutic exercises and activities, a comprehensive home exercise program, and a combination of the interventions as listed below to address Lisandro Huang's current impairments. Lindsey Dominguez will benefit from skilled PT (medically necessary) to address the deficits listed above that are affecting her participation in basic ADLs and in her overall functional tolerance. PROBLEM LIST (Impacting functional limitations):  1. Decreased Strength  2. Decreased ADL/Functional Activities  3. Decreased Transfer Abilities  4. Increased Pain  5. Decreased Activity Tolerance  6. Increased Fatigue  7. Decreased Flexibility/Joint Mobility  8. Decreased [place] with Home Exercise Program INTERVENTIONS PLANNED:  1. Balance Exercise  2. Bed Mobility  3. Cold  4. Heat  5. Cryotherapy  6. Family Education  7. Gait Training  8. Home Exercise Program (HEP)  9. Manual Therapy  10. Neuromuscular Re-education/Strengthening  11. Range of Motion (ROM)  12. Therapeutic Activites  13. Therapeutic Exercise/Strengthening  14. Transfer Training  15. Ultrasound  16. Electrical Stimulation  17. Aquatic Therapy   TREATMENT PLAN:  Effective Dates: 2/25/2019 TO 5/27/2019 (90 days). Frequency/Duration: 2 times a week for 90 Days (patient report she only plans to attend therapy for 1 month, as she is leaving for a 3 month trip at the end of March 2019)  GOALS: (Goals have been discussed and agreed upon with patient.)    SHORT-TERM FUNCTIONAL GOALS: Time Frame: 6 weeks  1. Lindsey Dominguez will report <=5/10 pain with don/doffing clothing as well as minimal/no difficulty. 2. Lindsey Dominguez will demonstrate improvement in active R shoulder flexion to >=90 degrees to increase UE function and participation in ADLs. 3. Lindsey Dominguez will demonstrate improvement in active R shoulder abduction to >=90 degrees to increase UE function and participation in ADLs.   [address] will show a >= 8 point decrease on the DASH in order to show an increase in upper extremity function. On[facility] Missy will be compliant in all HEP. DISCHARGE GOALS: Time Frame: 12 weeks    1. Norah Morgan will demonstrate >=75% of full AROM of the R UE in order to return to full, independent functional mobility. 2. Norah Morgan will show a >= 15 point decrease on the DASH in order to show an increase in upper extremity function. 3. Norah Morgan will report doing hair/bathing without difficulty and <=3/10 pain in the R UE in order to be independent with ADL's and return to her PLOF. Onur Louis will be independent in all advanced HEP. Onur [facility] Missy will improve her R UE MMT scores by one grade from those at her initial evaluation in order to increase her tolerance for participation in functional activities. Rehabilitation Potential For Stated Goals: Good  Regarding Titus Huang's therapy, I certify that the treatment plan above will be carried out by a therapist or under their direction. Thank you for this referral,  Ranjit Tellez DPT                 HISTORY:   History of Present Injury/Illness (Reason for Referral):  Patient presents with a closed, displaced fracture of the R upper humerus that occurred on 01/04/19 after the patient tripped over a piece of equipment at [facility]. Patient did not receive surgical interventions and per last orthopaedic note, her most recent x-rays show a healing fracture of the R proximal humerus. X-ray images can be found in patient's physical chart. Patient states she no longer has to wear a sling. She reports limitations in all overhead activities, dressing and bathing, grooming, lifting heavy objects, and driving. She reports being able to sleep in her bed now, but initially had to sleep in the recliner. She is not taking any pain medication and she rates her R shoulder pain at 6/10 today.    Past Medical History/Comorbidities:   Ms. Brandyn Barkley  has a past medical history of Chronic musculoskeletal pain, CVA (cerebral vascular accident) ([facility] (07/2018), Cystocele with rectocele, Essential hypertension (2/9/2016), GERD (gastroesophageal reflux disease), Hip fracture requiring operative repair ([facility] (1/9/2015), Hip fracture, left ([facility] (1/7/2015), Hyperlipidemia, Hypothyroidism, Impaired mobility and ADLs, Pure hypercholesterolemia (2/9/2016), and Type 2 diabetes mellitus without complication ([facility] (8/9/2997). Ms. Shelley Diaz  has a past surgical history that includes hx tonsillectomy; hx tubal ligation; hx dilation and curettage; hx colonoscopy; hx magali and bso; and hx hip fracture tx (Left). Social History/Living Environment:    Patient lives with her  and reported family in a one story home with 0 steps to enter. Prior Level of Function/Work/Activity:  Independent  Dominant Side:         RIGHT  Current Medications:    Current Outpatient Medications:     glipiZIDE-metFORMIN (METAGLIP) 5-500 mg per tablet, Take 1 Tab by mouth Before breakfast and dinner., Disp: 180 Tab, Rfl: 3    cyclobenzaprine (FLEXERIL) 10 mg tablet, TAKE 1 TABLET EVERY DAY, Disp: 90 Tab, Rfl: 0    gabapentin (NEURONTIN) 300 mg capsule, Take 3 caps po tid, Disp: 270 Cap, Rfl: 3    HYDROcodone-acetaminophen (NORCO) 7.5-325 mg per tablet, Take 2 Tabs by mouth every six (6) hours as needed for Pain (fracture arm). , Disp: , Rfl:     insulin NPH/insulin regular (NOVOLIN 70/30, HUMULIN 70/30) 100 unit/mL (70-30) injection, 20 units QAC breakfast and QAC supper, Disp: 10 mL, Rfl: 6    atorvastatin (LIPITOR) 80 mg tablet, Take 1 Tab by mouth daily. , Disp: 90 Tab, Rfl: 3    ezetimibe (ZETIA) 10 mg tablet, Take 1 Tab by mouth daily. , Disp: 90 Tab, Rfl: 3    levothyroxine (SYNTHROID) 100 mcg tablet, TAKE ONE TABLET BY MOUTH ONCE DAILY BEFORE BREAKFAST FOR HYPOTHYROIDISM, Disp: 90 Tab, Rfl: 3    clopidogrel (PLAVIX) 75 mg tab, Take 1 Tab by mouth daily. , Disp: 90 Tab, Rfl: 3    insulin lispro (HUMALOG) 100 unit/mL injection, Sliding scale as above for blood sugar levels before meals and at bedtime. For Blood Sugar (mg/dL) of:    Less than 150 =   0 units          150 -199 =   2 units 200 -249 =   4 units 250 -299 =   6 units 300 -349 =   8 units, Disp: 1 Vial, Rfl: 0    Insulin Needles, Disposable, (BD ULTRA-FINE SHORT PEN NEEDLE) 31 gauge x 5/16\" ndle, USE  TWICE DAILY AS DIRECTED WITH  INSULIN  PENS, Disp: 100 Pen Needle, Rfl: 17    glucose blood VI test strips (ONETOUCH ULTRA BLUE TEST STRIP) strip, USE TO TEST BLOOD SUGARS THREE TIMES A DAY, Disp: 300 Strip, Rfl: 11    ciprofloxacin HCl (CIPRO) 500 mg tablet, Take 1 Tab by mouth two (2) times a day., Disp: 10 Tab, Rfl: 1    FLUoxetine (PROZAC) 40 mg capsule, Take 1 Cap by mouth daily. , Disp: 90 Cap, Rfl: 3    cinnamon bark (CINNAMON) 500 mg cap, Take  by mouth., Disp: , Rfl:     chromium picolinate 400 mcg tab, Take  by mouth., Disp: , Rfl:     B.infantis-B.ani-B.long-B.bifi (PROBIOTIC 4X) 10-15 mg TbEC, Take 1 Tab by mouth daily. , Disp: , Rfl:     aspirin delayed-release 81 mg tablet, Take 1 Tab by mouth daily. , Disp: 30 Tab, Rfl: 0    vit a,c & e-lutein-minerals (VISION FORMULA, WITH LUTEIN,) tablet, Take 1 Tab by mouth daily. , Disp: , Rfl:     magnesium 250 mg tab, Take 250 mg by mouth daily. , Disp: , Rfl:     Alpha Lipoic Acid 600 mg cap, Take 200 mg by mouth daily. , Disp: , Rfl:     cyanocobalamin 1,000 mcg tablet, Take 1,000 mcg by mouth daily. , Disp: , Rfl:     ONETOUCH ULTRA TEST strip, TEST BLOOD SUGAR TWICE A   DAY, Disp: 200 Strip, Rfl: 2    CALCIUM CARB/VIT D3/MINERALS (CALCIUM-VITAMIN D PO), Take 1 Tab by mouth daily. , Disp: , Rfl:     MULTIVITAMIN PO, Take 1 Tab by mouth daily. , Disp: , Rfl:      Date Last Reviewed:  3/5/2019     Ambulatory/Rehab Services H2 Model Falls Risk Assessment    Risk Factors:       (5)  History of Recent Falls [w/in 3 months] Ability to Rise from Chair:       (1)  Pushes up, successful in one attempt Falls Prevention Plan:       No modifications necessary   Total: (5 or greater = High Risk): 6    ©2010 [facility] of [employer]. All Rights Reserved. [place]anie States Patent #[id number]. Federal Law prohibits the replication, distribution or use without written permission from [facility] [employer]        Number of Personal Factors/Comorbidities that affect the Plan of Care: 1-2: MODERATE COMPLEXITY   EXAMINATION:   Observation/Orthostatic Postural Assessment:          Assessed @ Initial Visit: Patient sits with forward head and rounded shoulders which indicate tight anterior chest musculature, upper trapezius, and levator scapula and weak posterior scapula musculature and deep cervical flexors. Patient displays decreased core motor control indicating weak core and low back musculature. No bruising or discoloration observed. Palpation:         Patient reports pain and tenderness to palpation of the R shoulder girdle and upper arm near her biceps muscle belly.    ROM:    AROM/PROM         Joint: Eval Date: 02/25/19  Re-Assess Date:  Re-Assess Date:    ACTIVE ROM (standing) RIGHT LEFT RIGHT LEFT RIGHT LEFT   Shoulder Flexion 75 degrees WNL       Shoulder Abduction 54 degrees WNL       Shoulder Internal Rotation (Apley's) L4; 20 degrees in 15-20 degrees of abduction WNL; T8       Shoulder External Rotation (Apley's) 24 degrees  in 15-20 degrees of abduction WNL       Elbow ROM WNL WNL       PASSIVE ROM (supine)         Shoulder Flexion Patient unable to fully relax        Shoulder Abduction Patient unable to fully relax        Shoulder Internal Rotation Patient unable to fully relax        Shoulder External Rotation Patient unable to fully relax                   Strength:    Joint: Eval Date: 02/25/19  Re-Assess Date:  Re-Assess Date:     RIGHT LEFT RIGHT LEFT RIGHT LEFT   Shoulder Flexion 3-/5 4/5       Shoulder Abduction 3-/5 4/5       Shoulder Internal Rotation 3-/5 4-/5       Shoulder External Rotation 3-/5 4-/5       Elbow Flexion 4-/5 4/5       Elbow Extension 3+/5 4/5                    Special Tests: Special tests not assessed, secondary to patient's diagnosis and fracture R proximal humerus confirmed by x-ray imaging. Neurological Screen: Assessed @ Initial Visit    Radiating symptoms? Patient denies any radicular symptoms into the B UEs   Functional Mobility:  Assessed @ Initial Visit: Patient is limited in her ability to participate in ADLs and in her overall functional mobility. Body Structures Involved:  1. Bones  2. Joints  3. Muscles  4. Ligaments Body Functions Affected:  1. Sensory/Pain  2. Neuromusculoskeletal  3. Movement Related Activities and Participation Affected:  1. General Tasks and Demands  2. Mobility  3. Self Care  4. Domestic Life  5. Community, Social and [place]   Number of elements that affect the Plan of Care: 4+: HIGH COMPLEXITY   CLINICAL PRESENTATION:   Presentation: Evolving clinical presentation with changing clinical characteristics: MODERATE COMPLEXITY   CLINICAL DECISION MAKING:   Outcome Measure: Tool Used: Disabilities of the Arm, Shoulder and Hand (DASH) Questionnaire - Quick Version  Score:  Initial: 43/55 (02/25/19) Most Recent: X/55 (Date: -- )   Interpretation of Score: The DASH is designed to measure the activities of daily living in person's with upper extremity dysfunction or pain. Each section is scored on a 1-5 scale, 5 representing the greatest disability. The scores of each section are added together for a total score of 55. Medical Necessity:   · Skilled intervention continues to be required due to deficits and impairments seen upon initial evaluation affecting patient's participation in ADLs and functional tasks. ·   Reason for Services/Other Comments:  · Patient continues to require skilled intervention due to deficits and impairments seen upon initial evaluation affecting patient's participation in ADLs and functional tasks.    Use of outcome tool(s) and clinical judgement create a POC that gives a: Questionable prediction of patient's progress: MODERATE COMPLEXITY   TREATMENT:   (In addition to Assessment/Re-Assessment sessions the following treatments were rendered)    Pre-treatment subjective comments/concerns: Patient reports 3/10 pain today in her R UE. She reports compliance with HEP. THERAPEUTIC EXERCISE: (45 minutes):  Exercises per grid below to improve mobility, strength and balance. Required minimal visual and verbal cues to promote proper body alignment, promote proper body posture and promote proper body mechanics. Progressed resistance, range and repetitions as indicated. Date:  02/28/19 Date:  03/05/19 Date:     Activity/Exercise Parameters Parameters Parameters   UBE X 8 minutes (4 forward, 4 backward)  Level 2.0 X 8 minutes (4 forward, 4 backward)  Level 2.0    Pulleys 2 x 10 reps R UE flexion with 5 second hold at end range    2 x 10 reps R UE abduction with 5 second hold at end range 2 x 10 reps R UE flexion with 5 second hold at end range    2 x 10 reps R UE abduction with 5 second hold at end range    2 x 10 reps R UE internal rotation with 5 second hold at end range    AAROM shoulder IR/ER 2 x 10 reps R UE with 5 second hold at end range; R UE supported on foam roll and therapist hand to prevent compensatory movment 2 x 10 reps R UE with 5 second hold at end range; R UE supported on foam roll and therapist hand to prevent compensatory movment    Resisted shoulder walkouts  X 10 reps resisting flexion with pink tubing    X 10 reps resisting extension with pink tubing                        MANUAL THERAPY: (0 minutes): Joint mobilization, Soft tissue mobilization and Manipulation was utilized and necessary because of the patient's restricted joint motion, painful spasm and restricted motion of soft tissue.  Done to improve soft tissue tone and elasticity  as well as promote proper joint movement in order to improve overall movement quality. (Used abbreviations: MET - muscle energy technique; PNF - proprioceptive neuromuscular facilitation; NMR - neuromuscular re-education; AP - anterior to posterior; PA - posterior to anterior). Patient positioned in supine with knees supported for PROM to the R UE into flexion, abduction, and ER to tolerance. Patient obtained around 100 degrees flexion/scaption and 80 degrees abduction. MODALITIES: (0 minutes):  None today. Treatment/Session Assessment: Patient tolerated treatment well and denied any increases in pain throughout. Patient demonstrated good improvements in ROM today. She reported increased discomfort during IR pulleys, but patient willing to work through the discomfort in order to progress her motion. Continue to progress ROM and strengthening interventions as patient is able to at next visit. · Pain/ Symptoms: Initial:   3/10 R UE Post Session:  2/10 R UE ·   Compliance with Program/Exercises: Will assess as treatment progresses. · Recommendations/Intent for next treatment session: \"Next visit will focus on advancements to more challenging activities\".   Total Treatment Duration:  PT Patient Time In/Time Out  Time In: 1100  Time Out: [address], DPT

## 2019-03-14 ENCOUNTER — HOSPITAL ENCOUNTER (OUTPATIENT)
Dept: PHYSICAL THERAPY | Age: 69
Discharge: HOME | End: 2019-03-14
Payer: COMMERCIAL

## 2019-03-14 PROCEDURE — 97110 THERAPEUTIC EXERCISES: CPT

## 2019-03-14 NOTE — THERAPY DISCHARGE
Bell Chapa  : 1950  Payor: Og Andrews / Plan: [employer]ELSY PATEL O MEDICARE ADVANTAGE / Product Type: Managed Care Medicare /  [address]  at [address]  [phone]   [phone]              OUTPATIENT PHYSICAL THERAPY:Daily Note and Discharge 3/14/2019    ICD-10: Treatment Diagnosis: Pain in right shoulder (M25.511); Stiffness of right shoulder, not elsewhere classified (M25.611); Muscle weakness (generalized) (M62.81)  Precautions/Allergies:   Codeine and Tramadol   Fall Risk Score: 6 (? 5 = High Risk)  MD Orders: Eval and Treat  MEDICAL/REFERRING DIAGNOSIS:  Other displaced fracture of upper end of right humerus, initial encounter for closed fracture [S42.291A]   DATE OF ONSET: 19  REFERRING PHYSICIAN: Maria Barahona MD  RETURN PHYSICIAN APPOINTMENT: TBD by patient      DISCHARGE ASSESSMENT:  Ms. Bell Chapa has attended 4 physical therapy sessions including the initial evaluation. Bell Chapa initially presented to PT with decreased R shoulder ROM, decreased B UE strength, increased R UE pain, decreased tolerance for functional activities, and decreased ability to participate in ADLs, all that have onset after she suffered a closed displaced fracture of the superior R humerus on 19 when she tripped over a piece of equipment at the gym. According to her most recent responses on the Disabilities of the Arm, Shoulder, and Hand (DASH) Questionnaire- Quick Version, Bell Chapa is 20.5% limited by her shoulder pain and dysfunction in her ability to fully participate in ADLs, a great improvement in her score from her initial evaluation. Bell Chapa is requesting to be discharged from OP PT at this time, as she and her  are going on a 3 month trip across the country.  Bell Chapa was advised and educated that she would benefit from continued skilled PT efforts at this time, as she is still displaying limited ROM and impaired strength in her R UE. Marianne Loyd verbalized understanding and stated she would contact her referring physician when she returns from her trip if she is still experiencing limitations. Since her initial evaluation, Marianne Loyd has met 3/5 of her short term goals and 3/5 of her long term goals. Marianne Loyd was educated on and provided with a discharge HEP and contact information, should any questions or concern arise. Thank you for this referral.   PROBLEM LIST (Impacting functional limitations):  1. Decreased Strength  2. Decreased ADL/Functional Activities  3. Decreased Transfer Abilities  4. Increased Pain  5. Decreased Activity Tolerance  6. Increased Fatigue  7. Decreased Flexibility/Joint Mobility  8. Decreased [place] with Home Exercise Program INTERVENTIONS PLANNED:  1. Balance Exercise  2. Bed Mobility  3. Cold  4. Heat  5. Cryotherapy  6. Family Education  7. Gait Training  8. Home Exercise Program (HEP)  9. Manual Therapy  10. Neuromuscular Re-education/Strengthening  11. Range of Motion (ROM)  12. Therapeutic Activites  13. Therapeutic Exercise/Strengthening  14. Transfer Training  15. Ultrasound  16. Electrical Stimulation  17. Aquatic Therapy   TREATMENT PLAN:  Effective Dates: 2/25/2019 TO 5/27/2019 (90 days). Frequency/Duration: 2 times a week for 90 Days (patient report she only plans to attend therapy for 1 month, as she is leaving for a 3 month trip at the end of March 2019)  GOALS: (Goals have been discussed and agreed upon with patient.)    SHORT-TERM FUNCTIONAL GOALS: Time Frame: 6 weeks  1. Marianne Loyd will report <=5/10 pain with don/doffing clothing as well as minimal/no difficulty. Goal met 03/14/19  2. Marianne Loyd will demonstrate improvement in active R shoulder flexion to >=90 degrees to increase UE function and participation in ADLs. Goal met 03/14/19  3Marquita Loyd will demonstrate improvement in active R shoulder abduction to >=90 degrees to increase UE function and participation in ADLs. Goal not met 03/14/19  4. Jeana Larry will show a >= 8 point decrease on the DASH in order to show an increase in upper extremity function. Goal met 03/14/19  5. Jeana Larry will be compliant in all HEP. Goal not met 03/14/19    DISCHARGE GOALS: Time Frame: 12 weeks    1. Jeana Larry will demonstrate >=75% of full AROM of the R UE in order to return to full, independent functional mobility. Goal not met 03/14/19  2. Jeana Larry will show a >= 15 point decrease on the DASH in order to show an increase in upper extremity function. Goal met 03/14/19  3. Jeana Larry will report doing hair/bathing without difficulty and <=3/10 pain in the R UE in order to be independent with ADL's and return to her PLOF. Goal met 03/14/19  4. Jeana Larry will be independent in all advanced HEP. Goal met 03/14/19  5. Jeana Larry will improve her R UE MMT scores by one grade from those at her initial evaluation in order to increase her tolerance for participation in functional activities. Goal not met 03/14/19    Rehabilitation Potential For Stated Goals: Good  Regarding Perla Huang's therapy, I certify that the treatment plan above will be carried out by a therapist or under their direction. Thank you for this referral,  Shannan Navarro DPT                 HISTORY:   History of Present Injury/Illness (Reason for Referral):  Patient presents with a closed, displaced fracture of the R upper humerus that occurred on 01/04/19 after the patient tripped over a piece of equipment at [facility]. Patient did not receive surgical interventions and per last orthopaedic note, her most recent x-rays show a healing fracture of the R proximal humerus. X-ray images can be found in patient's physical chart. Patient states she no longer has to wear a sling.  She reports limitations in all overhead activities, dressing and bathing, grooming, lifting heavy objects, and driving. She reports being able to sleep in her bed now, but initially had to sleep in the recliner. She is not taking any pain medication and she rates her R shoulder pain at 6/10 today. Past Medical History/Comorbidities:   Ms. Dionicio Pipre  has a past medical history of Chronic musculoskeletal pain, CVA (cerebral vascular accident) ([facility] (07/2018), Cystocele with rectocele, Essential hypertension (2/9/2016), GERD (gastroesophageal reflux disease), [facility] (1/9/2015), Hip fracture, left ([facility] (1/7/2015), Hyperlipidemia, Hypothyroidism, Impaired mobility and ADLs, Pure hypercholesterolemia (2/9/2016), and Type 2 diabetes mellitus without complication ([facility] (8/0/0185). Ms. Dionicio Piper  has a past surgical history that includes hx tonsillectomy; hx tubal ligation; hx dilation and curettage; hx colonoscopy; hx magali and bso; and hx hip fracture tx (Left). Social History/Living Environment:    Patient lives with her  and reported family in a one story home with 0 steps to enter. Prior Level of Function/Work/Activity:  Independent  Dominant Side:         RIGHT  Current Medications:    Current Outpatient Medications:     glipiZIDE-metFORMIN (METAGLIP) 5-500 mg per tablet, Take 1 Tab by mouth Before breakfast and dinner., Disp: 180 Tab, Rfl: 3    cyclobenzaprine (FLEXERIL) 10 mg tablet, TAKE 1 TABLET EVERY DAY, Disp: 90 Tab, Rfl: 0    gabapentin (NEURONTIN) 300 mg capsule, Take 3 caps po tid, Disp: 270 Cap, Rfl: 3    HYDROcodone-acetaminophen (NORCO) 7.5-325 mg per tablet, Take 2 Tabs by mouth every six (6) hours as needed for Pain (fracture arm). , Disp: , Rfl:     insulin NPH/insulin regular (NOVOLIN 70/30, HUMULIN 70/30) 100 unit/mL (70-30) injection, 20 units QAC breakfast and QAC supper, Disp: 10 mL, Rfl: 6    atorvastatin (LIPITOR) 80 mg tablet, Take 1 Tab by mouth daily. , Disp: 90 Tab, Rfl: 3    ezetimibe (ZETIA) 10 mg tablet, Take 1 Tab by mouth daily. , Disp: 90 Tab, Rfl: 3    levothyroxine (SYNTHROID) 100 mcg tablet, TAKE ONE TABLET BY MOUTH ONCE DAILY BEFORE BREAKFAST FOR HYPOTHYROIDISM, Disp: 90 Tab, Rfl: 3    clopidogrel (PLAVIX) 75 mg tab, Take 1 Tab by mouth daily. , Disp: 90 Tab, Rfl: 3    insulin lispro (HUMALOG) 100 unit/mL injection, Sliding scale as above for blood sugar levels before meals and at bedtime. For Blood Sugar (mg/dL) of:    Less than 150 =   0 units          150 -199 =   2 units 200 -249 =   4 units 250 -299 =   6 units 300 -349 =   8 units, Disp: 1 Vial, Rfl: 0    Insulin Needles, Disposable, (BD ULTRA-FINE SHORT PEN NEEDLE) 31 gauge x 5/16\" ndle, USE  TWICE DAILY AS DIRECTED WITH  INSULIN  PENS, Disp: 100 Pen Needle, Rfl: 17    glucose blood VI test strips (ONETOUCH ULTRA BLUE TEST STRIP) strip, USE TO TEST BLOOD SUGARS THREE TIMES A DAY, Disp: 300 Strip, Rfl: 11    ciprofloxacin HCl (CIPRO) 500 mg tablet, Take 1 Tab by mouth two (2) times a day., Disp: 10 Tab, Rfl: 1    FLUoxetine (PROZAC) 40 mg capsule, Take 1 Cap by mouth daily. , Disp: 90 Cap, Rfl: 3    cinnamon bark (CINNAMON) 500 mg cap, Take  by mouth., Disp: , Rfl:     chromium picolinate 400 mcg tab, Take  by mouth., Disp: , Rfl:     B.infantis-B.ani-B.long-B.bifi (PROBIOTIC 4X) 10-15 mg TbEC, Take 1 Tab by mouth daily. , Disp: , Rfl:     aspirin delayed-release 81 mg tablet, Take 1 Tab by mouth daily. , Disp: 30 Tab, Rfl: 0    vit a,c & e-lutein-minerals (VISION FORMULA, WITH LUTEIN,) tablet, Take 1 Tab by mouth daily. , Disp: , Rfl:     magnesium 250 mg tab, Take 250 mg by mouth daily. , Disp: , Rfl:     Alpha Lipoic Acid 600 mg cap, Take 200 mg by mouth daily. , Disp: , Rfl:     cyanocobalamin 1,000 mcg tablet, Take 1,000 mcg by mouth daily. , Disp: , Rfl:     ONETOUCH ULTRA TEST strip, TEST BLOOD SUGAR TWICE A   DAY, Disp: 200 Strip, Rfl: 2    CALCIUM CARB/VIT D3/MINERALS (CALCIUM-VITAMIN D PO), Take 1 Tab by mouth daily. , Disp: , Rfl:    MULTIVITAMIN PO, Take 1 Tab by mouth daily. , Disp: , Rfl:      Date Last Reviewed:  3/14/2019     Ambulatory/Rehab Services H2 Model Falls Risk Assessment    Risk Factors:       (5)  History of Recent Falls [w/in 3 months] Ability to Rise from Chair:       (1)  Pushes up, successful in one attempt    Falls Prevention Plan:       No modifications necessary   Total: (5 or greater = High Risk): 6    ©2010 [facility] [address] [place] [address] States Patent #[id number]. Federal Law prohibits the replication, distribution or use without written permission from [facility]        Number of Personal Factors/Comorbidities that affect the Plan of Care: 1-2: MODERATE COMPLEXITY   EXAMINATION:   Observation/Orthostatic Postural Assessment:          Assessed @ Initial Visit: Patient sits with forward head and rounded shoulders which indicate tight anterior chest musculature, upper trapezius, and levator scapula and weak posterior scapula musculature and deep cervical flexors. Patient displays decreased core motor control indicating weak core and low back musculature. No bruising or discoloration observed. Palpation:         Patient reports pain and tenderness to palpation of the R shoulder girdle and upper arm near her biceps muscle belly.    ROM:    AROM/PROM         Joint: Eval Date: 02/25/19  Re-Assess Date: 03/14/19  Re-Assess Date:    ACTIVE ROM (standing) RIGHT LEFT RIGHT LEFT RIGHT LEFT   Shoulder Flexion 75 degrees WNL 96 degrees WNL     Shoulder Abduction 54 degrees WNL 79 degrees WNL     Shoulder Internal Rotation (Apley's) L4; 20 degrees in 15-20 degrees of abduction WNL; T8 51 degrees in 15-20 degrees of abduction WNL     Shoulder External Rotation (Apley's) 24 degrees  in 15-20 degrees of abduction WNL 25 degrees  in 15-20 degrees of abduction WNL     Elbow ROM WNL WNL WNL WNL     PASSIVE ROM (supine)         Shoulder Flexion Patient unable to fully relax        Shoulder Abduction Patient unable to fully relax        Shoulder Internal Rotation Patient unable to fully relax        Shoulder External Rotation Patient unable to fully relax                   Strength:    Joint: Eval Date: 02/25/19  Re-Assess Date: 03/14/19  Re-Assess Date:     RIGHT LEFT RIGHT LEFT RIGHT LEFT   Shoulder Flexion 3-/5 4/5 3-/5 4/5     Shoulder Abduction 3-/5 4/5 3-/5 4/5     Shoulder Internal Rotation 3-/5 4-/5 3-/5 4-/5     Shoulder External Rotation 3-/5 4-/5 3-/5 4-/5     Elbow Flexion 4-/5 4/5 4-/5 4/5     Elbow Extension 3+/5 4/5 3+/5 4/5                  Special Tests: Special tests not assessed, secondary to patient's diagnosis and fracture R proximal humerus confirmed by x-ray imaging. Neurological Screen: Assessed @ Initial Visit    Radiating symptoms? Patient denies any radicular symptoms into the B UEs   Functional Mobility:  Assessed @ Initial Visit: Patient is limited in her ability to participate in ADLs and in her overall functional mobility. Body Structures Involved:  1. Bones  2. Joints  3. Muscles  4. Ligaments Body Functions Affected:  1. Sensory/Pain  2. Neuromusculoskeletal  3. Movement Related Activities and Participation Affected:  1. General Tasks and Demands  2. Mobility  3. Self Care  4. Domestic Life  5. Community, Social and [place]   Number of elements that affect the Plan of Care: 4+: HIGH COMPLEXITY   CLINICAL PRESENTATION:   Presentation: Evolving clinical presentation with changing clinical characteristics: MODERATE COMPLEXITY   CLINICAL DECISION MAKING:   Outcome Measure: Tool Used: Disabilities of the Arm, Shoulder and Hand (DASH) Questionnaire - Quick Version  Score:  Initial: 43/55 (02/25/19) Discharge: 20/55 (Date: 03/14/19 )   Interpretation of Score: The DASH is designed to measure the activities of daily living in person's with upper extremity dysfunction or pain. Each section is scored on a 1-5 scale, 5 representing the greatest disability.   The scores of each section are added together for a total score of 55. Medical Necessity:   · Skilled intervention continues to be required due to deficits and impairments seen upon initial evaluation affecting patient's participation in ADLs and functional tasks. ·   Reason for Services/Other Comments:  · Patient continues to require skilled intervention due to deficits and impairments seen upon initial evaluation affecting patient's participation in ADLs and functional tasks. Use of outcome tool(s) and clinical judgement create a POC that gives a: Questionable prediction of patient's progress: MODERATE COMPLEXITY   TREATMENT:   (In addition to Assessment/Re-Assessment sessions the following treatments were rendered)    Pre-treatment subjective comments/concerns: Patient reports 3/10 pain today in her R UE. She reports compliance with HEP. She is requesting to be discharged today, as she and her  are leaving for a 3 month trip next week. THERAPEUTIC EXERCISE: (40 minutes):  Exercises per grid below to improve mobility, strength and balance. Required minimal visual and verbal cues to promote proper body alignment, promote proper body posture and promote proper body mechanics. Progressed resistance, range and repetitions as indicated.      Date:  02/28/19 Date:  03/05/19 Date:  03/14/19   Activity/Exercise Parameters Parameters Parameters   UBE X 8 minutes (4 forward, 4 backward)  Level 2.0 X 8 minutes (4 forward, 4 backward)  Level 2.0 X 8 minutes (4 forward, 4 backward)  Level 2.0   Pulleys 2 x 10 reps R UE flexion with 5 second hold at end range    2 x 10 reps R UE abduction with 5 second hold at end range 2 x 10 reps R UE flexion with 5 second hold at end range    2 x 10 reps R UE abduction with 5 second hold at end range    2 x 10 reps R UE internal rotation with 5 second hold at end range    AAROM shoulder IR/ER 2 x 10 reps R UE with 5 second hold at end range; R UE supported on foam roll and therapist hand to prevent compensatory movment 2 x 10 reps R UE with 5 second hold at end range; R UE supported on foam roll and therapist hand to prevent compensatory movment    Resisted shoulder walkouts  X 10 reps resisting flexion with pink tubing    X 10 reps resisting extension with pink tubing    Shoulder flexion   2 x 15 reps with red theraband for HEP instruction    Shoulder extension   2 x 15 reps with red theraband for HEP instruction    Shoulder IR/ER   2 x 15 reps with yelliow theraband for HEP instruction    Standing rows   2 x 15 reps with red theraband for HEP instruction    Supine shoulder flexion AROM   2 x 15 reps with 1# bar in supine     MANUAL THERAPY: (0 minutes): Joint mobilization, Soft tissue mobilization and Manipulation was utilized and necessary because of the patient's restricted joint motion, painful spasm and restricted motion of soft tissue. Done to improve soft tissue tone and elasticity  as well as promote proper joint movement in order to improve overall movement quality. (Used abbreviations: MET - muscle energy technique; PNF - proprioceptive neuromuscular facilitation; NMR - neuromuscular re-education; AP - anterior to posterior; PA - posterior to anterior). Patient positioned in supine with knees supported for PROM to the R UE into flexion, abduction, and ER to tolerance. Patient obtained around 100 degrees flexion/scaption and 80 degrees abduction. MODALITIES: (0 minutes):  None today. Treatment/Session Assessment: Patient tolerated treatment well and denied any increases in pain throughout. Patient demonstrated good improvements in ROM today. Patient was educated on and provided with a discharge HEP including resisted shoulder flexion, extension, IR/ER, and standing rows with theraband. Patient provided with written instructions and pictures for personal use.      · Pain/ Symptoms: Initial:   3/10 R UE Post Session:  2/10 R UE ·   Compliance with Program/Exercises: Compliant most of the time.   Total Treatment Duration:  PT Patient Time In/Time Out  Time In: 1055  Time Out: [address], DPT

## 2019-06-14 ENCOUNTER — HOSPITAL ENCOUNTER (OUTPATIENT)
Dept: PHYSICAL THERAPY | Age: 69
Discharge: HOME | End: 2019-06-14
Payer: COMMERCIAL

## 2019-06-14 PROCEDURE — 97161 PT EVAL LOW COMPLEX 20 MIN: CPT

## 2019-06-14 NOTE — THERAPY EVALUATION
Huma Pardo  : 1950  Payor: Alvarado Saucedabrunilda / Plan: KELSY PATEL O MEDICARE ADVANTAGE / Product Type: Managed Care Medicare /  [facility] [place]  at [facility]  [place] [address], [place] [address] [address] [facility]  [phone]   [phone]              OUTPATIENT PHYSICAL THERAPY:Initial Assessment 2019   ICD-10: Treatment Diagnosis:      Pain in left shoulder (M25.512)  Muscle weakness (generalized) (M62.81)  Stiffness of left shoulder, not elsewhere classified (M25.612)  Precautions/Allergies:   Codeine and Tramadol   Fall Risk Score: 1 (? 5 = High Risk)  MD Orders: Eval and Treat  MEDICAL/REFERRING DIAGNOSIS:  Other displaced fracture of upper end of left humerus, initial encounter for closed fracture [S42.292A]  Pain in left shoulder [M25.512]   DATE OF ONSET: 19  REFERRING PHYSICIAN: Alejandro Marr MD  RETURN PHYSICIAN APPOINTMENT: TBD by patient      INITIAL ASSESSMENT:  Ms. Huma Pardo has attended 1 physical therapy session including the initial evaluation. Huma Pardo presents to PT with decreased L shoulder ROM, decreased B UE strength, increased L UE pain, decreased tolerance for functional activities, and decreased ability to participate in ADLs, all that have onset after she suffered a closed displaced fracture of the superior L humerus on 2019 when she fell off a few steps on her deck. She stated she was unaware she had a fracture in her L arm and noted she believed she had a rotator cuff tear. According to her responses on the Disabilities of the Arm, Shoulder, and Hand (DASH) Questionnaire- Quick Version, Huma Pardo is limited by her shoulder pain and dysfunction in her ability to fully participate in ADLs. Huma Pardo reports independence with ADLs at her baseline but is now reporting difficulty with activities such as cooking, dressing, bathing, grooming, and driving.  Recommending skilled PT: manual therapeutic techniques (as appropriate), therapeutic exercises and activities, a comprehensive home exercise program, and a combination of the interventions as listed below to address Mellisa Huang's current impairments. Lexi Francis will benefit from skilled PT (medically necessary) to address the deficits listed above that are affecting her participation in basic ADLs and in her overall functional tolerance. PROBLEM LIST (Impacting functional limitations):  1. Decreased Strength  2. Decreased ADL/Functional Activities  3. Decreased Transfer Abilities  4. Increased Pain  5. Decreased Activity Tolerance  6. Increased Fatigue  7. Decreased Flexibility/Joint Mobility  8. Decreased [place] with Home Exercise Program INTERVENTIONS PLANNED:  1. Balance Exercise  2. Bed Mobility  3. Cold  4. Heat  5. Cryotherapy  6. Family Education  7. Gait Training  8. Home Exercise Program (HEP)  9. Manual Therapy  10. Neuromuscular Re-education/Strengthening  11. Range of Motion (ROM)  12. Therapeutic Activites  13. Therapeutic Exercise/Strengthening  14. Transfer Training  15. Ultrasound  16. Electrical Stimulation  17. Aquatic Therapy   TREATMENT PLAN:  Effective Dates: 2/25/2019 TO 9/12/2019 (90 days). Frequency/Duration: 2 times a week for 90 Days     GOALS: (Goals have been discussed and agreed upon with patient.)    SHORT-TERM FUNCTIONAL GOALS: Time Frame: 6 weeks  1. Lexi Francis will report <=5/10 pain with don/doffing clothing as well as minimal/no difficulty. 2. Lexi Francis will demonstrate improvement in active L shoulder flexion to >=90 degrees to increase UE function and participation in ADLs. 3. Lexi Francis will demonstrate improvement in active L shoulder abduction to >=90 degrees to increase UE function and participation in ADLs. [address] will show a >= 8 point decrease on the DASH in order to show an increase in upper extremity function. [address] [place] [address] will be compliant in all HEP. DISCHARGE GOALS: Time Frame: 12 weeks    1. Carlo Landry will demonstrate >=75% of full AROM of the L UE in order to return to full, independent functional mobility. 2. Carlo Landry will show a >= 15 point decrease on the DASH in order to show an increase in upper extremity function. 3. Carlo Landry will report doing hair/bathing without difficulty and <=3/10 pain in the L UE in order to be independent with ADL's and return to her PLOF. Jose Mijares will be independent in all advanced HEP. Jose Mijares will improve her L UE MMT scores by one grade from those at her initial evaluation in order to increase her tolerance for participation in functional activities. Rehabilitation Potential For Stated Goals: Good  Regarding Teresita Huang's therapy, I certify that the treatment plan above will be carried out by a therapist or under their direction. Thank you for this referral,  Jose Miguel Flynn, PT, DPT     Referring Physician Signature: Marilu Collado MD              Date                    HISTORY:   History of Present Injury/Illness (Reason for Referral):  Patient states she fell two steps off a deck on Easter Sunday but then went on a cruise. She states she a doctor on board and then saw a specialist in [place]. She notes she saw Dr. Paz Burleson when she returned and was told to keep her L arm in a sling. She notes she just saw Dr. Paz Burleson and was told she could stay in the sling but she notes she puts it on every now and then because her arm hurts otherwise. She notes she had an x-ray done but does not have the report. She states she believed she tore her rotator cuff and not a fracture. She notes difficulty, making her bed, cooking, getting things out of the oven, driving, don/doffing clothing, washing her hair, reaching overhead, carrying objects. Pt states the pain can get as high as 7-8/10 and as low as 2/10.   Pt states she would like to regain full function for her arm to return to daily activities with more ease and less pain. Past Medical History/Comorbidities:   Ms. Berenice Ram  has a past medical history of Chronic musculoskeletal pain, CVA (cerebral vascular accident) ([facility] (07/2018), Cystocele with rectocele, Essential hypertension (2/9/2016), GERD (gastroesophageal reflux disease), Hip fracture requiring operative repair ([facility] (1/9/2015), Hip fracture, left ([facility] (1/7/2015), Hyperlipidemia, Hypothyroidism, Impaired mobility and ADLs, Pure hypercholesterolemia (2/9/2016), and Type 2 diabetes mellitus without complication ([facility].) (5/6/7437). Ms. Berenice Ram  has a past surgical history that includes hx tonsillectomy; hx tubal ligation; hx dilation and curettage; hx colonoscopy; hx magali and bso; and hx hip fracture tx (Left). Social History/Living Environment:    Patient lives with her  and reported family in a one story home with 0 steps to enter. Prior Level of Function/Work/Activity:  Independent  Dominant Side:         RIGHT  Current Medications:    Current Outpatient Medications:     glipiZIDE-metFORMIN (METAGLIP) 5-500 mg per tablet, Take 1 Tab by mouth Before breakfast and dinner., Disp: 180 Tab, Rfl: 3    cyclobenzaprine (FLEXERIL) 10 mg tablet, TAKE 1 TABLET EVERY DAY, Disp: 90 Tab, Rfl: 0    gabapentin (NEURONTIN) 300 mg capsule, Take 3 caps po tid, Disp: 270 Cap, Rfl: 3    HYDROcodone-acetaminophen (NORCO) 7.5-325 mg per tablet, Take 2 Tabs by mouth every six (6) hours as needed for Pain (fracture arm). , Disp: , Rfl:     insulin NPH/insulin regular (NOVOLIN 70/30, HUMULIN 70/30) 100 unit/mL (70-30) injection, 20 units QAC breakfast and QAC supper, Disp: 10 mL, Rfl: 6    atorvastatin (LIPITOR) 80 mg tablet, Take 1 Tab by mouth daily. , Disp: 90 Tab, Rfl: 3    ezetimibe (ZETIA) 10 mg tablet, Take 1 Tab by mouth daily. , Disp: 90 Tab, Rfl: 3    levothyroxine (SYNTHROID) 100 mcg tablet, TAKE ONE TABLET BY MOUTH ONCE DAILY BEFORE BREAKFAST FOR HYPOTHYROIDISM, Disp: 90 Tab, Rfl: 3    clopidogrel (PLAVIX) 75 mg tab, Take 1 Tab by mouth daily. , Disp: 90 Tab, Rfl: 3    insulin lispro (HUMALOG) 100 unit/mL injection, Sliding scale as above for blood sugar levels before meals and at bedtime. For Blood Sugar (mg/dL) of:    Less than 150 =   0 units          150 -199 =   2 units 200 -249 =   4 units 250 -299 =   6 units 300 -349 =   8 units, Disp: 1 Vial, Rfl: 0    Insulin Needles, Disposable, (BD ULTRA-FINE SHORT PEN NEEDLE) 31 gauge x 5/16\" ndle, USE  TWICE DAILY AS DIRECTED WITH  INSULIN  PENS, Disp: 100 Pen Needle, Rfl: 17    glucose blood VI test strips (ONETOUCH ULTRA BLUE TEST STRIP) strip, USE TO TEST BLOOD SUGARS THREE TIMES A DAY, Disp: 300 Strip, Rfl: 11    ciprofloxacin HCl (CIPRO) 500 mg tablet, Take 1 Tab by mouth two (2) times a day., Disp: 10 Tab, Rfl: 1    FLUoxetine (PROZAC) 40 mg capsule, Take 1 Cap by mouth daily. , Disp: 90 Cap, Rfl: 3    cinnamon bark (CINNAMON) 500 mg cap, Take  by mouth., Disp: , Rfl:     chromium picolinate 400 mcg tab, Take  by mouth., Disp: , Rfl:     B.infantis-B.ani-B.long-B.bifi (PROBIOTIC 4X) 10-15 mg TbEC, Take 1 Tab by mouth daily. , Disp: , Rfl:     aspirin delayed-release 81 mg tablet, Take 1 Tab by mouth daily. , Disp: 30 Tab, Rfl: 0    vit a,c & e-lutein-minerals (VISION FORMULA, WITH LUTEIN,) tablet, Take 1 Tab by mouth daily. , Disp: , Rfl:     magnesium 250 mg tab, Take 250 mg by mouth daily. , Disp: , Rfl:     Alpha Lipoic Acid 600 mg cap, Take 200 mg by mouth daily. , Disp: , Rfl:     cyanocobalamin 1,000 mcg tablet, Take 1,000 mcg by mouth daily. , Disp: , Rfl:     ONETOUCH ULTRA TEST strip, TEST BLOOD SUGAR TWICE A   DAY, Disp: 200 Strip, Rfl: 2    CALCIUM CARB/VIT D3/MINERALS (CALCIUM-VITAMIN D PO), Take 1 Tab by mouth daily. , Disp: , Rfl:     MULTIVITAMIN PO, Take 1 Tab by mouth daily. , Disp: , Rfl:      Date Last Reviewed:  6/18/2019     Ambulatory/Rehab Services H2 Model Falls Risk Assessment    Risk Factors: No Risk Factors Identified Ability to Rise from Chair:       (1)  Pushes up, successful in one attempt    Falls Prevention Plan:       No modifications necessary   Total: (5 or greater = High Risk): 1    ©2010 [facility] Patent #[id number]. Federal Law prohibits the replication, distribution or use without written permission from [facility]        Number of Personal Factors/Comorbidities that affect the Plan of Care: 1-2: MODERATE COMPLEXITY   EXAMINATION:   Observation/Orthostatic Postural Assessment:          Assessed @ Initial Visit: Patient sits with forward head and rounded shoulders which indicate tight anterior chest musculature, upper trapezius, and levator scapula and weak posterior scapula musculature and deep cervical flexors. Patient displays decreased core motor control indicating weak core and low back musculature. No bruising or discoloration observed. Palpation:         Patient reports pain and tenderness to palpation of the L shoulder girdle and upper arm near her biceps muscle belly.    ROM:    AROM/PROM         Joint: Eval Date: 6/14/2019  Re-Assess Date:  Re-Assess Date:    ACTIVE ROM (standing) RIGHT LEFT RIGHT LEFT RIGHT LEFT   Shoulder Flexion 120 degrees 30 degrees  Pain felt in middle of humerus       Shoulder extension 55 degrees 55 degrees       Shoulder Abduction 120 degrees 45 degrees  Extreme pain in middle of humerus       Shoulder Internal Rotation (Apley's) WFL; T8 L3; WFL in 15-20 degrees of abduction        Shoulder External Rotation (Apley's) WFL; T3  20 degrees in 15-20 degrees shoulder abduction       Elbow ROM WNL WNL       PASSIVE ROM (supine)         Shoulder Flexion NT Patient unable to fully relax       Shoulder Abduction NT Patient unable to fully relax       Shoulder Internal Rotation NT Patient unable to fully relax       Shoulder External Rotation NT Patient unable to fully relax Strength:    Joint: Eval Date: 6/14/2019  Re-Assess Date:  Re-Assess Date:     RIGHT LEFT RIGHT LEFT RIGHT LEFT   Shoulder Flexion 4/5 3-/5       Shoulder Abduction 4/5 3-/5       Shoulder Internal Rotation 4/5 3-/5       Shoulder External Rotation 4-/5 3-/5       Elbow Flexion 4+/5 3-/5       Elbow Extension 4+/5 3-/5                    Special Tests: Special tests not assessed, secondary to patient's diagnosis and fracture L proximal humerus confirmed by x-ray imaging. Neurological Screen: Assessed @ Initial Visit    Radiating symptoms? Patient denies any radicular symptoms into the B UEs     Functional Mobility:  Assessed @ Initial Visit: Patient is limited in her ability to participate in ADLs and in her overall functional mobility. Body Structures Involved:  1. Bones  2. Joints  3. Muscles  4. Ligaments Body Functions Affected:  1. Sensory/Pain  2. Neuromusculoskeletal  3. Movement Related Activities and Participation Affected:  1. General Tasks and Demands  2. Mobility  3. Self Care  4. Domestic Life  5. Community, Social and Muse [place]   Number of elements that affect the Plan of Care: 4+: HIGH COMPLEXITY   CLINICAL PRESENTATION:   Presentation: Evolving clinical presentation with changing clinical characteristics: MODERATE COMPLEXITY   CLINICAL DECISION MAKING:   Outcome Measure: Tool Used: Disabilities of the Arm, Shoulder and Hand (DASH) Questionnaire - Quick Version  Score:  Initial: 36/55 (6/14/2019) Most Recent: X/55 (Date: -- )   Interpretation of Score: The DASH is designed to measure the activities of daily living in person's with upper extremity dysfunction or pain. Each section is scored on a 1-5 scale, 5 representing the greatest disability. The scores of each section are added together for a total score of 55.       Medical Necessity:   · Skilled intervention continues to be required due to deficits and impairments seen upon initial evaluation affecting patient's participation in ADLs and functional tasks. ·   Reason for Services/Other Comments:  · Patient continues to require skilled intervention due to deficits and impairments seen upon initial evaluation affecting patient's participation in ADLs and functional tasks. Use of outcome tool(s) and clinical judgement create a POC that gives a: Questionable prediction of patient's progress: MODERATE COMPLEXITY   TREATMENT:     · Pain/ Symptoms: Initial:   6/10 R UE Post Session:  6/10 R UE ·   Compliance with Program/Exercises: Will assess as treatment progresses. · Recommendations/Intent for next treatment session: \"Next visit will focus on advancements to more challenging activities\".     Total Treatment Duration:  PT Patient Time In/Time Out  Time In: 1100  Time Out: [address] Neelam Ramachandran DPT

## 2019-06-25 PROBLEM — Z00.00 MEDICARE ANNUAL WELLNESS VISIT, SUBSEQUENT: Status: ACTIVE | Noted: 2019-06-25

## 2019-07-03 ENCOUNTER — HOSPITAL ENCOUNTER (OUTPATIENT)
Dept: PHYSICAL THERAPY | Age: 69
Discharge: HOME | End: 2019-07-03

## 2019-07-03 NOTE — PROGRESS NOTES
Lorraine Corea  : 1950  Primary: Mukesh Palm [employer] Medicare Advan*  Secondary:  [address]  at [facility], [address] [facility], [address]  [phone]   [phone]      7/3/2019 Patient had an appointment for today, but called earlier in the week to cancel.   Alisa Fernandez, PTA

## 2019-08-20 NOTE — THERAPY DISCHARGE
Carmen Ash  : 1950  Payor: Alex Ambrose / Plan: KELSY PATEL O MEDICARE ADVANTAGE / Product Type: Managed Care Medicare /  [facility] [place]  at [facility]  [place] [address], [address] [facility], [place] [address] [address] [facility]  [phone]   [phone]              OUTPATIENT PHYSICAL THERAPY:Discontinuation Summary 2019   ICD-10: Treatment Diagnosis:      Pain in left shoulder (M25.512)  Muscle weakness (generalized) (M62.81)  Stiffness of left shoulder, not elsewhere classified (M25.612)  Precautions/Allergies:   Codeine and Tramadol   Fall Risk Score: 1 (? 5 = High Risk)  MD Orders: Eval and Treat  MEDICAL/REFERRING DIAGNOSIS:  Other displaced fracture of upper end of left humerus, initial encounter for closed fracture [S42.292A]  Pain in left shoulder [M25.512]   DATE OF ONSET: 19  REFERRING PHYSICIAN: Pipo Mart MD  RETURN PHYSICIAN APPOINTMENT: TBD by patient    ATTENDANCE: As of 2019, Patient Name has attended 1 out of 1 scheduled visits, with 0 cancellation(s) and 0 no shows. Carmen Ash has been seen in physical therapy from 19 to 2019 for 1 visits. Treatment has been discontinued at this time due to patient failing to return for additional treatment. Below you will find details regarding goals that have met or not met prior to discontinuation. Some goals were not met due to failure to return to physical therpay. Thank you for this referral.           PROBLEM LIST (Impacting functional limitations):  1. Decreased Strength  2. Decreased ADL/Functional Activities  3. Decreased Transfer Abilities  4. Increased Pain  5. Decreased Activity Tolerance  6. Increased Fatigue  7. Decreased Flexibility/Joint Mobility  8. Decreased [place] with Home Exercise Program INTERVENTIONS PLANNED:  1. Balance Exercise  2. Bed Mobility  3. Cold  4. Heat  5. Cryotherapy  6. Family Education  7. Gait Training  8. Home Exercise Program (HEP)  9.  Manual Therapy  10. Neuromuscular Re-education/Strengthening  11. Range of Motion (ROM)  12. Therapeutic Activites  13. Therapeutic Exercise/Strengthening  14. Transfer Training  15. Ultrasound  16. Electrical Stimulation  17. Aquatic Therapy   TREATMENT PLAN:  Effective Dates: 2/25/2019 TO 9/12/2019 (90 days). Frequency/Duration: 2 times a week for 90 Days     GOALS: (Goals have been discussed and agreed upon with patient.)-NOT MET DUE TO FAILURE TO RETURN     SHORT-TERM FUNCTIONAL GOALS: Time Frame: 6 weeks  1. Nathaniel Morse will report <=5/10 pain with don/doffing clothing as well as minimal/no difficulty. 2. Nathaniel Morse will demonstrate improvement in active L shoulder flexion to >=90 degrees to increase UE function and participation in ADLs. 3. Nathaniel Morse will demonstrate improvement in active L shoulder abduction to >=90 degrees to increase UE function and participation in ADLs. Samina Guaman will show a >= 8 point decrease on the DASH in order to show an increase in upper extremity function. [address] [place] [address] will be compliant in all HEP. DISCHARGE GOALS: Time Frame: 12 weeks    1. Nathaniel Morse will demonstrate >=75% of full AROM of the L UE in order to return to full, independent functional mobility. 2. Nathaniel Morse will show a >= 15 point decrease on the DASH in order to show an increase in upper extremity function. 3. Nathaniel Morse will report doing hair/bathing without difficulty and <=3/10 pain in the L UE in order to be independent with ADL's and return to her PLOF. Samina Guaman will be independent in all advanced HEP. [address] [place] [address] will improve her L UE MMT scores by one grade from those at her initial evaluation in order to increase her tolerance for participation in functional activities.

## 2019-09-19 PROBLEM — Z00.00 MEDICARE ANNUAL WELLNESS VISIT, SUBSEQUENT: Status: RESOLVED | Noted: 2019-06-25 | Resolved: 2019-09-19

## 2019-09-25 PROBLEM — M79.622 LEFT UPPER ARM PAIN: Status: ACTIVE | Noted: 2019-09-25

## 2019-11-14 PROBLEM — R55 SYNCOPE: Status: ACTIVE | Noted: 2019-11-14
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
## 2019-11-14 PROBLEM — E11.40 TYPE 2 DIABETES MELLITUS WITH DIABETIC NEUROPATHY (HCC): Status: ACTIVE | Noted: 2019-11-14

## 2019-12-13 ENCOUNTER — HOSPITAL ENCOUNTER (OUTPATIENT)
Dept: CARDIAC CATH/INVASIVE PROCEDURES | Age: 69
Discharge: HOME | End: 2019-12-13
Attending: INTERNAL MEDICINE | Admitting: INTERNAL MEDICINE
Payer: COMMERCIAL

## 2019-12-13 VITALS
OXYGEN SATURATION: 92 % | HEART RATE: 80 BPM | SYSTOLIC BLOOD PRESSURE: 141 MMHG | DIASTOLIC BLOOD PRESSURE: 72 MMHG | RESPIRATION RATE: 18 BRPM

## 2019-12-13 PROCEDURE — C1764 EVENT RECORDER, CARDIAC: HCPCS

## 2019-12-13 PROCEDURE — 33285 INSJ SUBQ CAR RHYTHM MNTR: CPT

## 2019-12-13 PROCEDURE — 74011000250 HC RX REV CODE- 250: Performed by: INTERNAL MEDICINE

## 2019-12-13 PROCEDURE — 77030008462 HC STPLR SKN PROX J&J -A

## 2019-12-13 PROCEDURE — 77030012935 HC DRSG AQUACEL BMS -B

## 2019-12-13 PROCEDURE — 74011250636 HC RX REV CODE- 250/636: Performed by: INTERNAL MEDICINE

## 2019-12-13 PROCEDURE — 99152 MOD SED SAME PHYS/QHP 5/>YRS: CPT

## 2019-12-13 RX ORDER — SODIUM CHLORIDE 9 MG/ML
75 INJECTION, SOLUTION INTRAVENOUS CONTINUOUS
Status: DISCONTINUED | OUTPATIENT
Start: 2019-12-13 | End: 2019-12-13 | Stop reason: HOSPADM

## 2019-12-13 RX ORDER — MIDAZOLAM HYDROCHLORIDE 1 MG/ML
.5-2 INJECTION, SOLUTION INTRAMUSCULAR; INTRAVENOUS
Status: DISCONTINUED | OUTPATIENT
Start: 2019-12-13 | End: 2019-12-13 | Stop reason: HOSPADM

## 2019-12-13 RX ORDER — LIDOCAINE HYDROCHLORIDE AND EPINEPHRINE 10; 10 MG/ML; UG/ML
1.5 INJECTION, SOLUTION INFILTRATION; PERINEURAL ONCE
Status: COMPLETED | OUTPATIENT
Start: 2019-12-13 | End: 2019-12-13

## 2019-12-13 RX ADMIN — LIDOCAINE HYDROCHLORIDE AND EPINEPHRINE 15 MG: 10; 10 INJECTION, SOLUTION INFILTRATION; PERINEURAL at 12:04

## 2019-12-13 RX ADMIN — MIDAZOLAM 2 MG: 1 INJECTION INTRAMUSCULAR; INTRAVENOUS at 12:04

## 2019-12-13 NOTE — PROGRESS NOTES
Patient pre-assessment complete for Loop with Dr Ciara Guerrero scheduled for 19 at 12:30pm, arrival time 10:30amam. Patient verified using . Patient instructed to bring all home medications in labeled bottles on the day of procedure. NPO status reinforced. Patient instructed to HOLD metaglip & 70/30 in am . Instructed they can take all other medications excluding vitamins & supplements. Patient verbalizes understanding of all instructions & denies any questions at this time.     Pt asked to come in earlier secondary to cancellation, unable to come in any earlier than scheduled

## 2019-12-13 NOTE — DISCHARGE INSTRUCTIONS
LOOP MONITOR INSTRUCTIONS SHEET      Activity  · As tolerated and directed by your doctor  · Bathe or shower as directed by your doctor    Diet  · Resume your previous diet     Pain  ·  Call your doctor if pain is NOT relieved by OTC medication    Dressing Care: Leave dressing on the incision until you are seen in the follow up appointment . If dressing becomes loose,  You may remove it. Keep area Clean & dry, Do not get incision wet or  let water run over incision. Do not apply any lotions, creams or powder to incision. Follow-Up Phone Calls  · Will be made nursing staff  · If you have any problems, call your doctor as needed    Call your doctor if  · Excessive bleeding that does not stop after holding mild pressure over the area  · Temperature of 101 degrees F or above  · Redness,excessive swelling or bruising, and/or green or yellow, smelly discharge from incision    After Anesthesia  · For the first 24 hours: DO NOT Drive, Drink alcoholic beverages, or Make important decisions  · Be aware of dizziness following anesthesia and while taking pain medication    Medications - Continue home medications as previously prescribed     Other Instructions- Always show the doctor or dentist your loop recorder identification card.         Your doctor's phone number [facility] [phone]

## 2019-12-13 NOTE — PROGRESS NOTES
TRANSFER - OUT REPORT:    Verbal report given to Keyona Quevedo RN(name) on Estefani Saucedo  being transferred to CPRU(unit) for routine progression of care       Report consisted of patients Situation, Background, Assessment and   Recommendations(SBAR). Information from the following report(s) SBAR was reviewed with the receiving nurse.        Loop recorder implant with Dr. Bethel Tucker  Local lidocaine  L chest  2 mg versed  Staples and aquacel

## 2019-12-13 NOTE — PROGRESS NOTES
TRANSFER - IN REPORT:    Verbal report received from [facility]) on Michael Coad  being received from cath lab(unit) for routine progression of care      Report consisted of patients Situation, Background, Assessment and   Recommendations(SBAR). Information from the following report(s) Procedure Summary was reviewed with the receiving nurse. Opportunity for questions and clarification was provided. Assessment completed upon patients arrival to unit and care assumed.

## 2019-12-14 NOTE — PROCEDURES
[facility]  PROCEDURE NOTE    Name:  Sherie Witt  MR#:  [id number]  :  1950  ACCOUNT #:  [de-identified]  DATE OF SERVICE:  2019    PREOPERATIVE DIAGNOSIS:  syncope. POSTOPERATIVE DIAGNOSIS:  syncope. PROCEDURE PERFORMED:  Implantable loop recorder. SURGEON:  Niki Pardo MD    ASSISTANT:  None. ANESTHESIA:  Moderate sedation. ESTIMATED BLOOD LOSS:  Zero. SPECIMENS REMOVED:  none. COMPLICATIONS:  None. IMPLANTS:  Loop recorder. INDICATIONS:  Recurrent falls and syncope. PROCEDURE:  After informed consent, the patient was brought to the cath lab in unsedated state. Left parasternal region was prepped and draped in sterile fashion. Fourth intercostal space was locally anesthetized with 1% lidocaine solution. A 1-cm punch incision was made along the fourth intercostal space using [employer] LINQ scalpel tool. Next, the LINQ implantable recorder was percutaneously injected via punch incision on the skin above the pectoralis muscle. The implant tool was removed and the ILR was stably located under the skin. The ILR was interrogated and demonstrated adequate sensing. It was programmed per clinical specifications. The incision was closed with two surgical staples after hemostasis was confirmed. A sterile dressing was placed. The patient tolerated the procedure well. There were no complications. DEVICE INFORMATION:  Medtronic [id number], [id number]. SETTINGS:  Tachy, 158 beats per minute, *    Warner 40 beats per minute, 4 beats. Pause, 3 seconds. AT:  AF only. Moderate sedation was administered by nursing staff, Saw Bear RN. PROCEDURE START TIME:  12:04. PROCEDURE END-TIME:  12:10. MEDICATIONS:  2 mg of Versed administered.       MD EVELIA Key [email or web address]  D:  2019 13:09  T:  2019 23:13  JOB #:  [id number]

## 2020-01-02 PROBLEM — G47.9 SLEEP DISTURBANCE: Status: ACTIVE | Noted: 2020-01-02

## 2020-01-02 PROBLEM — K59.00 CONSTIPATION: Status: ACTIVE | Noted: 2020-01-02

## 2020-03-26 ENCOUNTER — HOSPITAL ENCOUNTER (OUTPATIENT)
Dept: GENERAL RADIOLOGY | Age: 70
Discharge: HOME | End: 2020-03-26

## 2020-03-26 DIAGNOSIS — R07.81 RIB PAIN ON RIGHT SIDE: ICD-10-CM
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
## 2020-03-26 DIAGNOSIS — M54.50 ACUTE LOW BACK PAIN, UNSPECIFIED BACK PAIN LATERALITY, UNSPECIFIED WHETHER SCIATICA PRESENT: ICD-10-CM

## 2020-03-26 DIAGNOSIS — M25.551 RIGHT HIP PAIN: ICD-10-CM

## 2020-03-26 NOTE — PROGRESS NOTES
Xray suspicious for pubic rami fracture. There is no surgical treatment for this; usual treatment is Physical Therapy, rest, and time.

## 2020-03-26 NOTE — PROGRESS NOTES
Xray lumbar spine is consistent with chronic changes of lower lumbar spine; probable aggravated by the fall; no fractures. PT could possibly help this too.

## 2020-03-27 ENCOUNTER — HOME HEALTH ADMISSION (OUTPATIENT)
Dept: HOME HEALTH SERVICES | Facility: HOME HEALTH | Age: 70
End: 2020-03-27
Payer: MEDICARE

## 2020-03-29 ENCOUNTER — HOME CARE VISIT (OUTPATIENT)
Dept: SCHEDULING | Facility: HOME HEALTH | Age: 70
End: 2020-03-29

## 2020-03-29 ENCOUNTER — HOME CARE VISIT (OUTPATIENT)
Dept: HOME HEALTH SERVICES | Facility: HOME HEALTH | Age: 70
End: 2020-03-29

## 2020-03-29 PROCEDURE — G0299 HHS/HOSPICE OF RN EA 15 MIN: HCPCS

## 2020-04-01 ENCOUNTER — HOME CARE VISIT (OUTPATIENT)
Dept: SCHEDULING | Facility: HOME HEALTH | Age: 70
End: 2020-04-01
Payer: MEDICARE

## 2020-04-01 VITALS
RESPIRATION RATE: 18 BRPM | SYSTOLIC BLOOD PRESSURE: 120 MMHG | DIASTOLIC BLOOD PRESSURE: 65 MMHG | HEART RATE: 80 BPM | TEMPERATURE: 98 F

## 2020-04-01 PROCEDURE — G0151 HHCP-SERV OF PT,EA 15 MIN: HCPCS

## 2020-04-01 PROCEDURE — 3331090002 HH PPS REVENUE DEBIT

## 2020-04-01 PROCEDURE — 3331090001 HH PPS REVENUE CREDIT

## 2020-04-01 PROCEDURE — 400013 HH SOC

## 2020-04-01 PROCEDURE — G0299 HHS/HOSPICE OF RN EA 15 MIN: HCPCS

## 2020-04-02 ENCOUNTER — HOME CARE VISIT (OUTPATIENT)
Dept: SCHEDULING | Facility: HOME HEALTH | Age: 70
End: 2020-04-02
Payer: MEDICARE

## 2020-04-02 VITALS
TEMPERATURE: 97.8 F | DIASTOLIC BLOOD PRESSURE: 68 MMHG | HEART RATE: 76 BPM | SYSTOLIC BLOOD PRESSURE: 110 MMHG | RESPIRATION RATE: 18 BRPM

## 2020-04-02 VITALS
TEMPERATURE: 98.2 F | WEIGHT: 140 LBS | HEIGHT: 67 IN | OXYGEN SATURATION: 97 % | HEART RATE: 74 BPM | DIASTOLIC BLOOD PRESSURE: 68 MMHG | RESPIRATION RATE: 18 BRPM | SYSTOLIC BLOOD PRESSURE: 114 MMHG | BODY MASS INDEX: 21.97 KG/M2

## 2020-04-02 PROCEDURE — G0152 HHCP-SERV OF OT,EA 15 MIN: HCPCS

## 2020-04-02 PROCEDURE — 3331090001 HH PPS REVENUE CREDIT

## 2020-04-02 PROCEDURE — 3331090002 HH PPS REVENUE DEBIT

## 2020-04-02 PROCEDURE — G0151 HHCP-SERV OF PT,EA 15 MIN: HCPCS

## 2020-04-03 ENCOUNTER — HOME CARE VISIT (OUTPATIENT)
Dept: SCHEDULING | Facility: HOME HEALTH | Age: 70
End: 2020-04-03
Payer: MEDICARE

## 2020-04-03 VITALS
HEART RATE: 64 BPM | DIASTOLIC BLOOD PRESSURE: 60 MMHG | SYSTOLIC BLOOD PRESSURE: 120 MMHG | RESPIRATION RATE: 14 BRPM | TEMPERATURE: 97.5 F

## 2020-04-03 VITALS
RESPIRATION RATE: 16 BRPM | HEART RATE: 89 BPM | SYSTOLIC BLOOD PRESSURE: 128 MMHG | OXYGEN SATURATION: 98 % | DIASTOLIC BLOOD PRESSURE: 72 MMHG | TEMPERATURE: 98.3 F

## 2020-04-03 PROCEDURE — 3331090002 HH PPS REVENUE DEBIT

## 2020-04-03 PROCEDURE — G0299 HHS/HOSPICE OF RN EA 15 MIN: HCPCS

## 2020-04-03 PROCEDURE — 3331090001 HH PPS REVENUE CREDIT

## 2020-04-04 PROCEDURE — 3331090001 HH PPS REVENUE CREDIT

## 2020-04-04 PROCEDURE — 3331090002 HH PPS REVENUE DEBIT

## 2020-04-05 ENCOUNTER — HOME CARE VISIT (OUTPATIENT)
Dept: SCHEDULING | Facility: HOME HEALTH | Age: 70
End: 2020-04-05
Payer: MEDICARE

## 2020-04-05 VITALS
HEART RATE: 76 BPM | DIASTOLIC BLOOD PRESSURE: 60 MMHG | OXYGEN SATURATION: 98 % | RESPIRATION RATE: 18 BRPM | TEMPERATURE: 96.8 F | SYSTOLIC BLOOD PRESSURE: 122 MMHG

## 2020-04-05 PROCEDURE — 3331090001 HH PPS REVENUE CREDIT

## 2020-04-05 PROCEDURE — G0299 HHS/HOSPICE OF RN EA 15 MIN: HCPCS

## 2020-04-05 PROCEDURE — 3331090002 HH PPS REVENUE DEBIT

## 2020-04-06 ENCOUNTER — HOME CARE VISIT (OUTPATIENT)
Dept: SCHEDULING | Facility: HOME HEALTH | Age: 70
End: 2020-04-06
Payer: MEDICARE

## 2020-04-06 VITALS
TEMPERATURE: 98.1 F | SYSTOLIC BLOOD PRESSURE: 116 MMHG | HEART RATE: 80 BPM | RESPIRATION RATE: 18 BRPM | DIASTOLIC BLOOD PRESSURE: 62 MMHG

## 2020-04-06 PROCEDURE — 3331090001 HH PPS REVENUE CREDIT

## 2020-04-06 PROCEDURE — 3331090002 HH PPS REVENUE DEBIT

## 2020-04-06 PROCEDURE — G0157 HHC PT ASSISTANT EA 15: HCPCS

## 2020-04-07 ENCOUNTER — HOME CARE VISIT (OUTPATIENT)
Dept: SCHEDULING | Facility: HOME HEALTH | Age: 70
End: 2020-04-07
Payer: MEDICARE

## 2020-04-07 VITALS
DIASTOLIC BLOOD PRESSURE: 63 MMHG | HEART RATE: 90 BPM | SYSTOLIC BLOOD PRESSURE: 114 MMHG | RESPIRATION RATE: 16 BRPM | TEMPERATURE: 97.4 F

## 2020-04-07 PROCEDURE — 3331090001 HH PPS REVENUE CREDIT

## 2020-04-07 PROCEDURE — 3331090002 HH PPS REVENUE DEBIT

## 2020-04-07 PROCEDURE — G0158 HHC OT ASSISTANT EA 15: HCPCS

## 2020-04-08 PROCEDURE — 3331090001 HH PPS REVENUE CREDIT

## 2020-04-08 PROCEDURE — 3331090002 HH PPS REVENUE DEBIT

## 2020-04-09 ENCOUNTER — HOME CARE VISIT (OUTPATIENT)
Dept: SCHEDULING | Facility: HOME HEALTH | Age: 70
End: 2020-04-09
Payer: MEDICARE

## 2020-04-09 VITALS
TEMPERATURE: 98 F | RESPIRATION RATE: 19 BRPM | DIASTOLIC BLOOD PRESSURE: 58 MMHG | HEART RATE: 76 BPM | SYSTOLIC BLOOD PRESSURE: 108 MMHG

## 2020-04-09 PROCEDURE — 3331090002 HH PPS REVENUE DEBIT

## 2020-04-09 PROCEDURE — G0157 HHC PT ASSISTANT EA 15: HCPCS

## 2020-04-09 PROCEDURE — 3331090001 HH PPS REVENUE CREDIT

## 2020-04-10 ENCOUNTER — HOME CARE VISIT (OUTPATIENT)
Dept: SCHEDULING | Facility: HOME HEALTH | Age: 70
End: 2020-04-10
Payer: MEDICARE

## 2020-04-10 PROCEDURE — 3331090001 HH PPS REVENUE CREDIT

## 2020-04-10 PROCEDURE — G0158 HHC OT ASSISTANT EA 15: HCPCS

## 2020-04-10 PROCEDURE — 3331090002 HH PPS REVENUE DEBIT

## 2020-04-11 ENCOUNTER — HOME CARE VISIT (OUTPATIENT)
Dept: SCHEDULING | Facility: HOME HEALTH | Age: 70
End: 2020-04-11
Payer: MEDICARE

## 2020-04-11 VITALS
SYSTOLIC BLOOD PRESSURE: 108 MMHG | RESPIRATION RATE: 20 BRPM | HEART RATE: 91 BPM | TEMPERATURE: 97.7 F | OXYGEN SATURATION: 97 % | DIASTOLIC BLOOD PRESSURE: 60 MMHG

## 2020-04-11 PROCEDURE — 3331090002 HH PPS REVENUE DEBIT

## 2020-04-11 PROCEDURE — G0299 HHS/HOSPICE OF RN EA 15 MIN: HCPCS

## 2020-04-11 PROCEDURE — 3331090001 HH PPS REVENUE CREDIT

## 2020-04-12 VITALS
RESPIRATION RATE: 19 BRPM | HEART RATE: 87 BPM | DIASTOLIC BLOOD PRESSURE: 54 MMHG | TEMPERATURE: 98.4 F | SYSTOLIC BLOOD PRESSURE: 113 MMHG

## 2020-04-12 PROCEDURE — 3331090001 HH PPS REVENUE CREDIT

## 2020-04-12 PROCEDURE — 3331090002 HH PPS REVENUE DEBIT

## 2020-04-13 ENCOUNTER — HOME CARE VISIT (OUTPATIENT)
Dept: SCHEDULING | Facility: HOME HEALTH | Age: 70
End: 2020-04-13
Payer: MEDICARE

## 2020-04-13 VITALS
HEART RATE: 84 BPM | TEMPERATURE: 98.2 F | SYSTOLIC BLOOD PRESSURE: 122 MMHG | DIASTOLIC BLOOD PRESSURE: 62 MMHG | RESPIRATION RATE: 19 BRPM

## 2020-04-13 PROCEDURE — 3331090002 HH PPS REVENUE DEBIT

## 2020-04-13 PROCEDURE — G0157 HHC PT ASSISTANT EA 15: HCPCS

## 2020-04-13 PROCEDURE — 3331090001 HH PPS REVENUE CREDIT

## 2020-04-14 PROCEDURE — 3331090002 HH PPS REVENUE DEBIT

## 2020-04-14 PROCEDURE — 3331090001 HH PPS REVENUE CREDIT

## 2020-04-15 ENCOUNTER — HOME CARE VISIT (OUTPATIENT)
Dept: SCHEDULING | Facility: HOME HEALTH | Age: 70
End: 2020-04-15
Payer: MEDICARE

## 2020-04-15 PROCEDURE — G0299 HHS/HOSPICE OF RN EA 15 MIN: HCPCS

## 2020-04-15 PROCEDURE — 3331090001 HH PPS REVENUE CREDIT

## 2020-04-15 PROCEDURE — 3331090002 HH PPS REVENUE DEBIT

## 2020-04-16 ENCOUNTER — HOME CARE VISIT (OUTPATIENT)
Dept: SCHEDULING | Facility: HOME HEALTH | Age: 70
End: 2020-04-16
Payer: MEDICARE

## 2020-04-16 VITALS
OXYGEN SATURATION: 98 % | HEART RATE: 87 BPM | RESPIRATION RATE: 18 BRPM | TEMPERATURE: 96 F | SYSTOLIC BLOOD PRESSURE: 108 MMHG | DIASTOLIC BLOOD PRESSURE: 58 MMHG

## 2020-04-16 VITALS
DIASTOLIC BLOOD PRESSURE: 60 MMHG | HEART RATE: 83 BPM | SYSTOLIC BLOOD PRESSURE: 112 MMHG | TEMPERATURE: 97.8 F | RESPIRATION RATE: 18 BRPM

## 2020-04-16 PROCEDURE — G0158 HHC OT ASSISTANT EA 15: HCPCS

## 2020-04-16 PROCEDURE — 3331090001 HH PPS REVENUE CREDIT

## 2020-04-16 PROCEDURE — 3331090002 HH PPS REVENUE DEBIT

## 2020-04-16 PROCEDURE — G0157 HHC PT ASSISTANT EA 15: HCPCS

## 2020-04-17 PROCEDURE — 3331090002 HH PPS REVENUE DEBIT

## 2020-04-17 PROCEDURE — 3331090001 HH PPS REVENUE CREDIT

## 2020-04-18 ENCOUNTER — HOSPITAL ENCOUNTER (OUTPATIENT)
Dept: LAB | Age: 70
Discharge: HOME | End: 2020-04-18
Payer: MEDICARE

## 2020-04-18 ENCOUNTER — HOME CARE VISIT (OUTPATIENT)
Dept: HOME HEALTH SERVICES | Facility: HOME HEALTH | Age: 70
End: 2020-04-18
Payer: MEDICARE

## 2020-04-18 VITALS
SYSTOLIC BLOOD PRESSURE: 118 MMHG | HEART RATE: 86 BPM | TEMPERATURE: 97.7 F | RESPIRATION RATE: 18 BRPM | DIASTOLIC BLOOD PRESSURE: 62 MMHG

## 2020-04-18 VITALS
DIASTOLIC BLOOD PRESSURE: 60 MMHG | TEMPERATURE: 97.8 F | RESPIRATION RATE: 18 BRPM | SYSTOLIC BLOOD PRESSURE: 112 MMHG | HEART RATE: 83 BPM

## 2020-04-18 LAB
EST. AVERAGE GLUCOSE BLD GHB EST-MCNC: 171 MG/DL
HBA1C MFR BLD: 7.6 % (ref 4.8–6)

## 2020-04-18 PROCEDURE — 3331090002 HH PPS REVENUE DEBIT

## 2020-04-18 PROCEDURE — 3331090001 HH PPS REVENUE CREDIT

## 2020-04-18 PROCEDURE — 83036 HEMOGLOBIN GLYCOSYLATED A1C: CPT

## 2020-04-18 PROCEDURE — G0299 HHS/HOSPICE OF RN EA 15 MIN: HCPCS

## 2020-04-19 PROCEDURE — 3331090001 HH PPS REVENUE CREDIT

## 2020-04-19 PROCEDURE — 3331090002 HH PPS REVENUE DEBIT

## 2020-04-20 ENCOUNTER — HOME CARE VISIT (OUTPATIENT)
Dept: SCHEDULING | Facility: HOME HEALTH | Age: 70
End: 2020-04-20
Payer: MEDICARE

## 2020-04-20 VITALS
SYSTOLIC BLOOD PRESSURE: 124 MMHG | RESPIRATION RATE: 16 BRPM | HEART RATE: 89 BPM | TEMPERATURE: 97.6 F | DIASTOLIC BLOOD PRESSURE: 66 MMHG

## 2020-04-20 PROCEDURE — 3331090001 HH PPS REVENUE CREDIT

## 2020-04-20 PROCEDURE — G0158 HHC OT ASSISTANT EA 15: HCPCS

## 2020-04-20 PROCEDURE — 3331090002 HH PPS REVENUE DEBIT

## 2020-04-21 ENCOUNTER — HOME CARE VISIT (OUTPATIENT)
Dept: SCHEDULING | Facility: HOME HEALTH | Age: 70
End: 2020-04-21
Payer: MEDICARE

## 2020-04-21 VITALS
SYSTOLIC BLOOD PRESSURE: 128 MMHG | DIASTOLIC BLOOD PRESSURE: 78 MMHG | HEART RATE: 78 BPM | TEMPERATURE: 97.1 F | RESPIRATION RATE: 18 BRPM

## 2020-04-21 PROCEDURE — G0151 HHCP-SERV OF PT,EA 15 MIN: HCPCS

## 2020-04-21 PROCEDURE — 3331090001 HH PPS REVENUE CREDIT

## 2020-04-21 PROCEDURE — 3331090002 HH PPS REVENUE DEBIT

## 2020-04-22 PROCEDURE — 3331090001 HH PPS REVENUE CREDIT

## 2020-04-22 PROCEDURE — 3331090002 HH PPS REVENUE DEBIT

## 2020-04-23 PROCEDURE — 3331090002 HH PPS REVENUE DEBIT

## 2020-04-23 PROCEDURE — 3331090001 HH PPS REVENUE CREDIT

## 2020-04-24 PROCEDURE — 3331090002 HH PPS REVENUE DEBIT

## 2020-04-24 PROCEDURE — 3331090001 HH PPS REVENUE CREDIT

## 2020-04-25 PROCEDURE — 3331090002 HH PPS REVENUE DEBIT

## 2020-04-25 PROCEDURE — 3331090001 HH PPS REVENUE CREDIT

## 2020-04-26 PROCEDURE — 3331090002 HH PPS REVENUE DEBIT

## 2020-04-26 PROCEDURE — 3331090001 HH PPS REVENUE CREDIT

## 2020-04-27 ENCOUNTER — HOME CARE VISIT (OUTPATIENT)
Dept: SCHEDULING | Facility: HOME HEALTH | Age: 70
End: 2020-04-27
Payer: MEDICARE

## 2020-04-27 PROCEDURE — 3331090001 HH PPS REVENUE CREDIT

## 2020-04-27 PROCEDURE — 3331090002 HH PPS REVENUE DEBIT

## 2020-04-27 PROCEDURE — 3331090003 HH PPS REVENUE ADJ

## 2020-04-27 PROCEDURE — G0152 HHCP-SERV OF OT,EA 15 MIN: HCPCS

## 2020-04-28 VITALS
TEMPERATURE: 98.7 F | HEART RATE: 84 BPM | DIASTOLIC BLOOD PRESSURE: 60 MMHG | RESPIRATION RATE: 16 BRPM | SYSTOLIC BLOOD PRESSURE: 132 MMHG

## 2020-04-28 PROCEDURE — 3331090001 HH PPS REVENUE CREDIT

## 2020-04-28 PROCEDURE — 3331090002 HH PPS REVENUE DEBIT

## 2020-10-14 PROBLEM — F43.21 SITUATIONAL DEPRESSION: Status: RESOLVED | Noted: 2018-09-10 | Resolved: 2020-10-14

## 2020-10-19 PROBLEM — F32.A DEPRESSION: Status: RESOLVED | Noted: 2018-10-23 | Resolved: 2020-10-19

## 2020-11-17 ENCOUNTER — HOSPITAL ENCOUNTER (OUTPATIENT)
Dept: MAMMOGRAPHY | Age: 70
Discharge: HOME | End: 2020-11-17
Attending: FAMILY MEDICINE
Payer: MEDICARE

## 2020-11-17 DIAGNOSIS — Z12.31 ENCOUNTER FOR SCREENING MAMMOGRAM FOR MALIGNANT NEOPLASM OF BREAST: ICD-10-CM

## 2020-11-17 DIAGNOSIS — Z78.0 POST-MENOPAUSAL: ICD-10-CM

## 2020-11-17 PROCEDURE — 77067 SCR MAMMO BI INCL CAD: CPT

## 2020-11-17 PROCEDURE — 77080 DXA BONE DENSITY AXIAL: CPT

## 2020-12-16 NOTE — PROGRESS NOTES
Please let patient know that her DEXA scan was consistent with osteoporosis and she should be taking vitamin D as well as a bisphosphonate or other medication.   She can discuss this with Jeanie Dennison on her upcoming visit

## 2020-12-17 PROBLEM — I73.9 PERIPHERAL VASCULAR DISEASE (HCC): Status: ACTIVE | Noted: 2020-12-17

## 2020-12-28 ENCOUNTER — HOSPITAL ENCOUNTER (OUTPATIENT)
Dept: ULTRASOUND IMAGING | Age: 70
Discharge: HOME | End: 2020-12-28
Attending: PHYSICIAN ASSISTANT
Payer: MEDICARE

## 2020-12-28 DIAGNOSIS — K82.8 CALCIFICATION OF GALLBLADDER: ICD-10-CM

## 2020-12-28 PROCEDURE — 76705 ECHO EXAM OF ABDOMEN: CPT

## 2021-05-11 PROBLEM — N39.41 URGE INCONTINENCE: Status: ACTIVE | Noted: 2021-05-11

## 2021-10-25 ENCOUNTER — PATIENT OUTREACH (OUTPATIENT)
Dept: CASE MANAGEMENT | Age: 71
End: 2021-10-25

## 2021-10-26 ENCOUNTER — HOSPITAL ENCOUNTER (EMERGENCY)
Age: 71
Discharge: LEFT BEFORE DISCHARGE | End: 2021-10-26
Payer: MEDICARE

## 2021-10-26 PROCEDURE — 75810000275 HC EMERGENCY DEPT VISIT NO LEVEL OF CARE

## 2021-11-08 ENCOUNTER — HOSPITAL ENCOUNTER (EMERGENCY)
Age: 71
Discharge: PSYCHIATRIC OR SUBSTANCE USE FACILITY | End: 2021-11-10
Attending: EMERGENCY MEDICINE
Payer: MEDICARE

## 2021-11-08 DIAGNOSIS — R45.851 SUICIDAL IDEATION: Primary | ICD-10-CM

## 2021-11-08 LAB
ALBUMIN SERPL-MCNC: 3.5 G/DL (ref 3.2–4.6)
ALBUMIN/GLOB SERPL: 0.9 {RATIO} (ref 1.2–3.5)
ALP SERPL-CCNC: 60 U/L (ref 50–136)
ALT SERPL-CCNC: 50 U/L (ref 12–65)
AMPHET UR QL SCN: NEGATIVE
ANION GAP SERPL CALC-SCNC: 8 MMOL/L (ref 7–16)
APAP SERPL-MCNC: <10 UG/ML (ref 10–30)
AST SERPL-CCNC: 61 U/L (ref 15–37)
BARBITURATES UR QL SCN: NEGATIVE
BASOPHILS # BLD: 0 K/UL (ref 0–0.2)
BASOPHILS # BLD: 0 K/UL (ref 0–0.2)
BASOPHILS NFR BLD: 1 % (ref 0–2)
BASOPHILS NFR BLD: 1 % (ref 0–2)
BENZODIAZ UR QL: NEGATIVE
BILIRUB SERPL-MCNC: 1.3 MG/DL (ref 0.2–1.1)
BUN SERPL-MCNC: 12 MG/DL (ref 8–23)
CALCIUM SERPL-MCNC: 9.6 MG/DL (ref 8.3–10.4)
CANNABINOIDS UR QL SCN: NEGATIVE
CHLORIDE SERPL-SCNC: 110 MMOL/L (ref 98–107)
CO2 SERPL-SCNC: 24 MMOL/L (ref 21–32)
COCAINE UR QL SCN: NEGATIVE
CREAT SERPL-MCNC: 0.69 MG/DL (ref 0.6–1)
DIFFERENTIAL METHOD BLD: ABNORMAL
DIFFERENTIAL METHOD BLD: ABNORMAL
EOSINOPHIL # BLD: 0.1 K/UL (ref 0–0.8)
EOSINOPHIL # BLD: 0.1 K/UL (ref 0–0.8)
EOSINOPHIL NFR BLD: 2 % (ref 0.5–7.8)
EOSINOPHIL NFR BLD: 2 % (ref 0.5–7.8)
ERYTHROCYTE [DISTWIDTH] IN BLOOD BY AUTOMATED COUNT: 18.6 % (ref 11.9–14.6)
ERYTHROCYTE [DISTWIDTH] IN BLOOD BY AUTOMATED COUNT: 18.7 % (ref 11.9–14.6)
ETHANOL SERPL-MCNC: <3 MG/DL
GLOBULIN SER CALC-MCNC: 4 G/DL (ref 2.3–3.5)
GLUCOSE SERPL-MCNC: 191 MG/DL (ref 65–100)
HCT VFR BLD AUTO: 30.1 % (ref 35.8–46.3)
HCT VFR BLD AUTO: 31 % (ref 35.8–46.3)
HGB BLD-MCNC: 8 G/DL (ref 11.7–15.4)
HGB BLD-MCNC: 8.3 G/DL (ref 11.7–15.4)
IMM GRANULOCYTES # BLD AUTO: 0 K/UL (ref 0–0.5)
IMM GRANULOCYTES # BLD AUTO: 0 K/UL (ref 0–0.5)
IMM GRANULOCYTES NFR BLD AUTO: 0 % (ref 0–5)
IMM GRANULOCYTES NFR BLD AUTO: 0 % (ref 0–5)
LYMPHOCYTES # BLD: 0.7 K/UL (ref 0.5–4.6)
LYMPHOCYTES # BLD: 0.8 K/UL (ref 0.5–4.6)
LYMPHOCYTES NFR BLD: 14 % (ref 13–44)
LYMPHOCYTES NFR BLD: 16 % (ref 13–44)
MCH RBC QN AUTO: 18 PG (ref 26.1–32.9)
MCH RBC QN AUTO: 18 PG (ref 26.1–32.9)
MCHC RBC AUTO-ENTMCNC: 26.6 G/DL (ref 31.4–35)
MCHC RBC AUTO-ENTMCNC: 26.8 G/DL (ref 31.4–35)
MCV RBC AUTO: 67.4 FL (ref 79.6–97.8)
MCV RBC AUTO: 67.8 FL (ref 79.6–97.8)
METHADONE UR QL: NEGATIVE
MONOCYTES # BLD: 0.6 K/UL (ref 0.1–1.3)
MONOCYTES # BLD: 0.6 K/UL (ref 0.1–1.3)
MONOCYTES NFR BLD: 12 % (ref 4–12)
MONOCYTES NFR BLD: 12 % (ref 4–12)
NEUTS SEG # BLD: 3.3 K/UL (ref 1.7–8.2)
NEUTS SEG # BLD: 3.4 K/UL (ref 1.7–8.2)
NEUTS SEG NFR BLD: 69 % (ref 43–78)
NEUTS SEG NFR BLD: 72 % (ref 43–78)
NRBC # BLD: 0 K/UL (ref 0–0.2)
NRBC # BLD: 0 K/UL (ref 0–0.2)
OPIATES UR QL: NEGATIVE
PCP UR QL: NEGATIVE
PLATELET # BLD AUTO: 136 K/UL (ref 150–450)
PLATELET # BLD AUTO: 151 K/UL (ref 150–450)
PMV BLD AUTO: 10.5 FL (ref 9.4–12.3)
PMV BLD AUTO: ABNORMAL FL (ref 9.4–12.3)
POTASSIUM SERPL-SCNC: 4.6 MMOL/L (ref 3.5–5.1)
PROT SERPL-MCNC: 7.5 G/DL (ref 6.3–8.2)
RBC # BLD AUTO: 4.44 M/UL (ref 4.05–5.2)
RBC # BLD AUTO: 4.6 M/UL (ref 4.05–5.2)
SALICYLATES SERPL-MCNC: <1.7 MG/DL (ref 2.8–20)
SODIUM SERPL-SCNC: 142 MMOL/L (ref 136–145)
WBC # BLD AUTO: 4.8 K/UL (ref 4.3–11.1)
WBC # BLD AUTO: 4.8 K/UL (ref 4.3–11.1)

## 2021-11-08 PROCEDURE — 82077 ASSAY SPEC XCP UR&BREATH IA: CPT

## 2021-11-08 PROCEDURE — 85025 COMPLETE CBC W/AUTO DIFF WBC: CPT

## 2021-11-08 PROCEDURE — 99285 EMERGENCY DEPT VISIT HI MDM: CPT

## 2021-11-08 PROCEDURE — 80307 DRUG TEST PRSMV CHEM ANLYZR: CPT

## 2021-11-08 PROCEDURE — 80179 DRUG ASSAY SALICYLATE: CPT

## 2021-11-08 PROCEDURE — 80143 DRUG ASSAY ACETAMINOPHEN: CPT

## 2021-11-08 PROCEDURE — 80053 COMPREHEN METABOLIC PANEL: CPT

## 2021-11-08 RX ORDER — TRAZODONE HYDROCHLORIDE 50 MG/1
150 TABLET ORAL ONCE
Status: ACTIVE | OUTPATIENT
Start: 2021-11-08 | End: 2021-11-09

## 2021-11-08 NOTE — ED PROVIDER NOTES
Here for depression and some suicidal thoughts. States that she can handle all the stress that she is under at present. She has a daughter with what she describes as stage IV cancer who was additionally attacked by dogs and is in the nursing home. The patient is responsible for some of the issues related to this admission to the nursing facility and she states that she does not know how to do this and feels even more stressed than she can handle. She has made some cutting gestures to her wrist that are very superficial.  She does not have by her report to me a true suicidal gesture in the past.  Is very vague when she talks about interaction with her spouse he had challenges additionally there. Denies any substance abuse issue. No substance abuse issues    The history is provided by the patient. Suicidal  This is a new problem. The problem has not changed since onset. Pertinent negatives include no focal weakness, no slurred speech, no agitation and no mental status change. There has been no fever. Pertinent negatives include no altered mental status and no confusion. Associated medical issues include CVA. Associated medical issues do not include seizures or dementia.         Past Medical History:   Diagnosis Date    Chronic musculoskeletal pain     CVA (cerebral vascular accident) ([address] 07/2018    right pontine    Cystocele with rectocele     H/O    Essential hypertension 2/9/2016    GERD (gastroesophageal reflux disease)     diet controlled     Hip fracture requiring operative repair ([address] 1/9/2015    H/O LFT HIP REPAIR    Hip fracture, left ([address] 1/7/2015    Hyperlipidemia     Hypothyroidism     Impaired mobility and ADLs     Menopause     Psychiatric disorder     Pure hypercholesterolemia 2/9/2016    Type 2 diabetes mellitus without complication ([address] 4/7/2136       Past Surgical History:   Procedure Laterality Date    HX COLONOSCOPY      HX DILATION AND CURETTAGE      HX HIP FRACTURE TX Left     HX MT AND BSO      HX TONSILLECTOMY      HX TUBAL LIGATION           Family History:   Problem Relation Age of Onset   [employer] Cancer Mother     Cancer Father     No Known Problems Maternal Grandmother     No Known Problems Maternal Grandfather     No Known Problems Paternal Grandmother     No Known Problems Paternal Grandfather     Breast Cancer Neg Hx        Social History     Socioeconomic History    Marital status:      Spouse name: Not on file    Number of children: Not on file    Years of education: Not on file    Highest education level: Not on file   Occupational History    Not on file   Tobacco Use    Smoking status: Never Smoker    Smokeless tobacco: Never Used   Vaping Use    Vaping Use: Never used   Substance and Sexual Activity    Alcohol use: No    Drug use: No    Sexual activity: Not Currently     Partners: Male     Birth control/protection: None   Other Topics Concern    Not on file   Social History Narrative    Not on file     Social Determinants of Health     Financial Resource Strain:     Difficulty of Paying Living Expenses: Not on file   Food Insecurity:     Worried About Running Out of Food in the Last Year: Not on file    Christa of Food in the Last Year: Not on file   Transportation Needs:     Lack of Transportation (Medical): Not on file    Lack of Transportation (Non-Medical):  Not on file   Physical Activity:     Days of Exercise per Week: Not on file    Minutes of Exercise per Session: Not on file   Stress:     Feeling of Stress : Not on file   Social Connections:     Frequency of Communication with Friends and Family: Not on file    Frequency of Social Gatherings with Friends and Family: Not on file    Attends Mu-ism Services: Not on file    Active Member of Clubs or Organizations: Not on file    Attends Club or Organization Meetings: Not on file    Marital Status: Not on file   Intimate Partner Violence:     Fear of Current or Ex-Partner: Not on file    Emotionally Abused: Not on file    Physically Abused: Not on file    Sexually Abused: Not on file   Housing Stability:     Unable to Pay for Housing in the Last Year: Not on file    Number of Places Lived in the Last Year: Not on file    Unstable Housing in the Last Year: Not on file         ALLERGIES: Codeine and Tramadol    Review of Systems   HENT: Negative. Respiratory: Negative. Gastrointestinal: Negative. Neurological: Negative for focal weakness. Psychiatric/Behavioral: Positive for dysphoric mood and suicidal ideas. Negative for agitation, behavioral problems, confusion and hallucinations. The patient is nervous/anxious. All other systems reviewed and are negative. Vitals:    11/08/21 1556   BP: (!) 164/72   Pulse: (!) 103   Resp: 25   Temp: 98.4 °F (36.9 °C)   SpO2: 96%   Weight: 66.2 kg (146 lb)   Height: 5' 7\" (1.702 m)            Physical Exam  Vitals and nursing note reviewed. Constitutional:       General: She is not in acute distress. Appearance: Normal appearance. She is well-developed. She is not ill-appearing or diaphoretic. HENT:      Head: Atraumatic. Right Ear: External ear normal.      Left Ear: External ear normal.      Nose: Nose normal.   Eyes:      General: No scleral icterus. Cardiovascular:      Rate and Rhythm: Normal rate. Pulmonary:      Effort: Pulmonary effort is normal. No respiratory distress. Musculoskeletal:      Cervical back: Neck supple. Skin:     General: Skin is warm and dry. Comments: Very superficial scratches that her linear to right greater than left wrist region   Neurological:      Mental Status: She is alert. Mental status is at baseline. Psychiatric:         Mood and Affect: Mood is depressed. Mood is not anxious. Affect is flat. Affect is not labile, angry or inappropriate. Behavior: Behavior is withdrawn. Behavior is not aggressive, hyperactive or combative.  Behavior is cooperative. Thought Content: Thought content normal.          MDM  Number of Diagnoses or Management Options  Suicidal ideation: new and requires workup  Diagnosis management comments: Here with depressive issues with at present mild suicidal gestures where she scratched each wrist.  No history of inpatient psychiatric needs in the past and has not had prior psychiatric care necessitated for any prior anxiety depressive or other issues. No substance abuse. Does have a situational issue with a daughter with stage IV cancer who additionally had trauma from being attacked by dogs and stressors related to this probable leading provokers of patient's current mental status. Is  though somewhat indeterminate as the support she gets from her spouse to my ability to receive this information. 9:28 AM  Patient accepted at [facility] for behavioral health by Dr. Caron Garcia completed       Amount and/or Complexity of Data Reviewed  Clinical lab tests: ordered and reviewed  Review and summarize past medical records: yes    Risk of Complications, Morbidity, and/or Mortality  Presenting problems: moderate  Diagnostic procedures: moderate  Management options: moderate  General comments: Elements of this note have been dictated via voice recognition software. Text and phrases may be limited by the accuracy of the software. The chart has been reviewed, but errors may still be present.       Patient Progress  Patient progress: stable         Procedures

## 2021-11-08 NOTE — ED TRIAGE NOTES
PT arrives via EMS from home presenting with cut arms and stating wanting to die bc daughter has a recent dx of cancer. A/ox4. VSS.

## 2021-11-09 LAB
GLUCOSE BLD STRIP.AUTO-MCNC: 153 MG/DL (ref 65–100)
GLUCOSE BLD STRIP.AUTO-MCNC: 176 MG/DL (ref 65–100)
GLUCOSE BLD STRIP.AUTO-MCNC: 199 MG/DL (ref 65–100)
GLUCOSE BLD STRIP.AUTO-MCNC: 210 MG/DL (ref 65–100)
SERVICE CMNT-IMP: ABNORMAL

## 2021-11-09 PROCEDURE — 74011636637 HC RX REV CODE- 636/637: Performed by: EMERGENCY MEDICINE

## 2021-11-09 PROCEDURE — 82962 GLUCOSE BLOOD TEST: CPT

## 2021-11-09 PROCEDURE — 74011250637 HC RX REV CODE- 250/637: Performed by: EMERGENCY MEDICINE

## 2021-11-09 RX ORDER — GUAIFENESIN 100 MG/5ML
162 LIQUID (ML) ORAL DAILY
Status: DISCONTINUED | OUTPATIENT
Start: 2021-11-09 | End: 2021-11-10 | Stop reason: HOSPADM

## 2021-11-09 RX ORDER — CLOPIDOGREL BISULFATE 75 MG/1
75 TABLET ORAL DAILY
Status: DISCONTINUED | OUTPATIENT
Start: 2021-11-09 | End: 2021-11-10 | Stop reason: HOSPADM

## 2021-11-09 RX ORDER — INSULIN GLARGINE 100 [IU]/ML
50 INJECTION, SOLUTION SUBCUTANEOUS DAILY
Status: DISCONTINUED | OUTPATIENT
Start: 2021-11-09 | End: 2021-11-10 | Stop reason: HOSPADM

## 2021-11-09 RX ORDER — LEVOTHYROXINE SODIUM 50 UG/1
100 TABLET ORAL
Status: DISCONTINUED | OUTPATIENT
Start: 2021-11-09 | End: 2021-11-10 | Stop reason: HOSPADM

## 2021-11-09 RX ORDER — INSULIN LISPRO 100 [IU]/ML
INJECTION, SOLUTION INTRAVENOUS; SUBCUTANEOUS
Status: DISCONTINUED | OUTPATIENT
Start: 2021-11-09 | End: 2021-11-10 | Stop reason: HOSPADM

## 2021-11-09 RX ADMIN — INSULIN GLARGINE 50 UNITS: 100 INJECTION, SOLUTION SUBCUTANEOUS at 08:09

## 2021-11-09 RX ADMIN — INSULIN LISPRO 4 UNITS: 100 INJECTION, SOLUTION INTRAVENOUS; SUBCUTANEOUS at 22:21

## 2021-11-09 RX ADMIN — METFORMIN HYDROCHLORIDE: 500 TABLET ORAL at 18:48

## 2021-11-09 RX ADMIN — INSULIN LISPRO 2 UNITS: 100 INJECTION, SOLUTION INTRAVENOUS; SUBCUTANEOUS at 18:48

## 2021-11-09 RX ADMIN — METFORMIN HYDROCHLORIDE: 500 TABLET ORAL at 08:09

## 2021-11-09 RX ADMIN — INSULIN LISPRO 2 UNITS: 100 INJECTION, SOLUTION INTRAVENOUS; SUBCUTANEOUS at 08:36

## 2021-11-09 RX ADMIN — ASPIRIN 162 MG: 81 TABLET, CHEWABLE ORAL at 08:10

## 2021-11-09 RX ADMIN — CLOPIDOGREL BISULFATE 75 MG: 75 TABLET ORAL at 08:09

## 2021-11-09 RX ADMIN — INSULIN LISPRO 2 UNITS: 100 INJECTION, SOLUTION INTRAVENOUS; SUBCUTANEOUS at 12:59

## 2021-11-09 RX ADMIN — LEVOTHYROXINE SODIUM 100 MCG: 0.05 TABLET ORAL at 08:09

## 2021-11-09 NOTE — ED NOTES
Pt states she would like to call her , explained to pt that is is 530am and we should wait until a little later today

## 2021-11-09 NOTE — ED NOTES
Care of pt taken over by danette Doshi. Pt resting with eyes closed, resp even and none labored.  Sitter watching pt 1 : 1.

## 2021-11-09 NOTE — ED NOTES
Patient has available refill at pharmacy.  Patient informed via [address] [place] [address] Patient is very quiet in bed, does not answer questions willingly. Pt denies SI/HI, denies visual/auditory hallucinations at this time.

## 2021-11-09 NOTE — ED NOTES
[facility] ED Psychiatric RECHECK NOTE for 2021  Arrival Date/Time: 2021  4:15 PM      Saima Todd  MRN: [id number]    YOB: 1950   80 y.o. female    [facility] EMERGENCY DEPT ER14/14  Seen on 2021 @ 12:55 PM        she is on papers. Commitment Papers  on:     she has completed a tele-psych evaluation. Nicola Jaeger is a 80 y.o. female here for depression and suicidal ideations. Objective:   Vitals:    21 1556 21 0808   BP: (!) 164/72 (!) 107/56   Pulse: (!) 103 87   Resp: 25 16   Temp: 98.4 °F (36.9 °C) 98 °F (36.7 °C)   SpO2: 96% 97%       Recent Labs:   Recent Labs     21  1558      K 4.6   *   CO2 24   BUN 12   CREA 0.69   CA 9.6   *      No lab exists for component: UDS,  BUZZ    Current Facility-Administered Medications   Medication Dose Route Frequency    aspirin chewable tablet 162 mg  162 mg Oral DAILY    clopidogreL (PLAVIX) tablet 75 mg  75 mg Oral DAILY    levothyroxine (SYNTHROID) tablet 100 mcg  100 mcg Oral 6am    glipiZIDE/metFORMIN (METAGLIP) 5/500 mg   Oral BID WITH MEALS    insulin glargine (LANTUS) injection 50 Units  50 Units SubCUTAneous DAILY    insulin lispro (HUMALOG) injection   SubCUTAneous AC&HS     Current Outpatient Medications   Medication Sig    glucose blood VI test strips (OneTouch Ultra Test) strip Check blood sugars TID DX:E11.9    alendronate (FOSAMAX) 70 mg tablet Take 1 Tablet by mouth every seven (7) days. Remain upright for at least 30 minutes after taking medication  Indications: decreased bone mass following menopause    levothyroxine (SYNTHROID) 100 mcg tablet TAKE ONE TABLET BY MOUTH ONCE DAILY BEFORE BREAKFAST FOR HYPOTHYROIDISM    atorvastatin (LIPITOR) 80 mg tablet Take 1 Tablet by mouth daily. Indications: excessive fat in the blood    clopidogreL (PLAVIX) 75 mg tab Take 1 Tablet by mouth daily.     glipiZIDE-metFORMIN (METAGLIP) 5-500 mg per tablet Take 1 Tablet by mouth Before breakfast and dinner.  insulin NPH/insulin regular (NOVOLIN 70/30, HUMULIN 70/30) 100 unit/mL (70-30) injection 50 units qam and 50 units qhs  Indications: type 2 diabetes mellitus    mirtazapine (REMERON) 15 mg tablet TAKE 1 TABLET EVERY NIGHT    fluticasone propionate (FLONASE) 50 mcg/actuation nasal spray 2 Sprays by Both Nostrils route daily.  Myrbetriq 50 mg ER tablet Take 1 Tablet by mouth daily. Indications: overactive bladder    cholecalciferol (VITAMIN D3) (5000 Units/125 mcg) tab tablet Take 1 Tab by mouth daily.  Diabetic Shoes XX Pt needs pair of diabetic shoes    aspirin delayed-release 81 mg tablet Take 1 Tab by mouth daily.  vit a,c & e-lutein-minerals (VISION FORMULA, WITH LUTEIN,) tablet Take 1 Tab by mouth daily.  CALCIUM CARB/VIT D3/MINERALS (CALCIUM-VITAMIN D PO) Take 1 Tab by mouth daily. Assessment and Plan:  Check rapid Covid to clear for impending admission. Continue current medications.     Haylee Lerner MD; 11/9/2021 @12:55 PM ===============

## 2021-11-09 NOTE — ED NOTES
Constant Observer Yes - Name: Kristen Johnson   Constant Observer Oriented YES   High risk patients are in line of sight at all times Yes   Excess equipment/medical supplies not necessary for the care of the patient removed Yes   All sharp or dangerous objects are removed from room: including but not limited to belts, pens & pencils, needles, medications, cosmetics, lighters, matches, nail files, watches, necklaces, glass objects, razors, razor blades, knives, aerosol sprays, drawstring pants, shoes, cords (telephone, call bells, etc.) cleaning wipes or other cleaning items, aluminum cans, not permanently attached wall décor Yes   Telephone/cell phone removed as well as TV remote (batteries can be swallowed) Yes   Patient belongings removed and labeled at nurses station Yes   Excess linen is removed from room Yes   All plastic bags are removed from the room and replaced with paper trash bags Yes   Patient is in paper scrubs or appropriate gown and using hospital socks with rubber soles Yes   No metal, hard eating utensils or hard plates are on meal tray Yes   Remove all cleaning agents used by Jf's Yes   If Crucifix is hanging on a nail, remove Crucifix as well as the nail Yes       *If any question above is answered \"No,\" documentation is required.

## 2021-11-09 NOTE — ED NOTES
Pt is resting quietly with eyes closed. No acute distress noted. Sitter in the doorway. Will continue to monitor.

## 2021-11-09 NOTE — ED NOTES
Patient resting at this time. No signs or symptoms of distress. Respirations even and unlabored. Sitter at bedside. Safety precautions in place.

## 2021-11-09 NOTE — PROGRESS NOTES
[facility]                                                                                                 PATIENT INFORMATION   Patient Name: Andrés Michel, Samm Basurto [place]: [de-identified] Patient MRN: [id number]   Address: [address] [place] [id number] Patient CSN: [id number]      Cong[employer]: [employer]   Sex: Female Marital Status:    : 1950 Age:   80 yrs   Home Phone: [phone] 2.00 Mobile Phone:   [phone]     1.00   Race: [address] [place] [address],[address] Employer:   Retired   Language: ENGLISH Admitted/Arrived From:   Home [[id number]]   ADMISSION INFORMATION   Admit Date: 2021 Admit Time:  4:15 PM   Patient Class: Emergency Service: Emergency   Admit Source: Non-health care facility* Admit Type: EMERGENCY   Admitting Provider:   Attending Provider:     Unit: [address] [place] [address] Emergency Dept Room/Bed: ER14/14    Admission Diagnosis:  and codes:      Emergency Complaint: Suicidal                Discharge Date:   Discharge Time:     GUARANTOR INFORMATION   Name:  Lamar Holguin Address: [address] [place] [address]  Rel:  Self   Phone: [address] : 1950   EMERGENCY CONTACTS   Name: Howard Todd:  Mobile: [phone]    Rel: Spouse   COVERAGE INFORMATION   Primary Insurance:   Corinne Blue: Lamar Chelsie   Plan Name: [address] [place] [address] Medicare R Sukh Montalvo 46 to Subscriber: Self [[id number]]   Claim Address: [address] [place] [address] Sex: Female      Policy #:  [id number]    Group #: [id number] Group Name:       Auth #: N/A Ins Phone:         Secondary Insurance:   Subscriber:     Plan Name:   Pt Rel to Subscriber:     Claim Address: NA Sex:       Policy #: N/A    Group #: N/A Group Name: N/A   Auth #: N/A Ins Phone:         Accident Date:    Accident Type:     PROVIDER INFORMATION   PCP:         Nataly Martin MD PCP Phone:  [phone]   Referring Prov:   No ref.  provider found Referring Phone:  Referring Fax:  N/A      Advanced Directive:  Received Research:     Lab Client:   Enrollment Status:              Printed on 11/9/21 10:12 AM Page      Referral sent to [place] [phone] 3/4 [facility] referral is being reviewed  [facility] bed offer tomorrow.

## 2021-11-10 ENCOUNTER — PATIENT OUTREACH (OUTPATIENT)
Dept: CASE MANAGEMENT | Age: 71
End: 2021-11-10

## 2021-11-10 VITALS
HEART RATE: 87 BPM | RESPIRATION RATE: 16 BRPM | TEMPERATURE: 98.2 F | SYSTOLIC BLOOD PRESSURE: 128 MMHG | DIASTOLIC BLOOD PRESSURE: 70 MMHG | OXYGEN SATURATION: 98 % | WEIGHT: 146 LBS | BODY MASS INDEX: 22.91 KG/M2 | HEIGHT: 67 IN

## 2021-11-10 LAB
GLUCOSE BLD STRIP.AUTO-MCNC: 161 MG/DL (ref 65–100)
SERVICE CMNT-IMP: ABNORMAL

## 2021-11-10 PROCEDURE — 82962 GLUCOSE BLOOD TEST: CPT

## 2021-11-10 PROCEDURE — 74011250637 HC RX REV CODE- 250/637: Performed by: EMERGENCY MEDICINE

## 2021-11-10 PROCEDURE — 74011636637 HC RX REV CODE- 636/637: Performed by: EMERGENCY MEDICINE

## 2021-11-10 RX ADMIN — LEVOTHYROXINE SODIUM 100 MCG: 0.05 TABLET ORAL at 06:46

## 2021-11-10 RX ADMIN — INSULIN GLARGINE 50 UNITS: 100 INJECTION, SOLUTION SUBCUTANEOUS at 08:32

## 2021-11-10 RX ADMIN — INSULIN LISPRO 50 UNITS: 100 INJECTION, SOLUTION INTRAVENOUS; SUBCUTANEOUS at 08:32

## 2021-11-10 RX ADMIN — ASPIRIN 162 MG: 81 TABLET, CHEWABLE ORAL at 08:31

## 2021-11-10 RX ADMIN — CLOPIDOGREL BISULFATE 75 MG: 75 TABLET ORAL at 08:31

## 2021-11-10 RX ADMIN — METFORMIN HYDROCHLORIDE: 500 TABLET ORAL at 08:53

## 2021-11-10 NOTE — PROGRESS NOTES
Care Management Interventions  Transition of Care Consult (CM Consult): Discharge Planning, Other (IP Psych)  The Plan for Transition of Care is Related to the Following Treatment Goals : IP Psych  The Patient and/or Patient Representative was Provided with a Choice of Provider and Agrees with the Discharge Plan?: Yes  Name of the Patient Representative Who was Provided with a Choice of Provider and Agrees with the Discharge Plan: Patient  Freedom of Choice List was Provided with Basic Dialogue that Supports the Patient's Individualized Plan of Care/Goals, Treatment Preferences and Shares the Quality Data Associated with the Providers?: Yes  Discharge Location  Discharge Placement: Psychiatric Unit        Bed offer from [facility]. Accepted by Dr. Manju Fofana. Patient's spouse notified. Patient transported via Dawit Gonzalez MD in agreement with DCP. No further CM needs.

## 2021-11-10 NOTE — ED NOTES
RN at bedside; pt asleep; breathing even and unlabored with steady chest rise and fall; no distress noted at this time; RN to continue to monitor

## 2021-11-10 NOTE — ED NOTES
RN at bedside; pt asleep; breathing even and unlabored with steady chest rise and fall; no distress noted at this time; sitter in line of sight; RN to continue to monitor

## 2021-11-10 NOTE — ED NOTES
TRANSFER - OUT REPORT:    Verbal report given to Sharron(name) on Leesa Baxter  being transferred to [facility] unit 7(unit) for routine progression of care       Report consisted of patients Situation, Background, Assessment and   Recommendations(SBAR). Information from the following report(s) SBAR and ED Summary was reviewed with the receiving nurse. Lines:       Opportunity for questions and clarification was provided.       [facility] transported [facility]:  [facility]

## 2021-11-10 NOTE — PROGRESS NOTES
Review of chart and per ER notes the patient is currently in ER for psychiatric needs at this time. Calls will suspend at this time.     CC: Bishop Joe MD

## 2021-11-10 NOTE — Clinical Note
Good afternoon,    Please see [facility] notes for updates on referral.    Thanks,  Isidoro Petties

## 2021-11-10 NOTE — ED NOTES
Report received from [facility]; no outstanding orders at this time; pt on papers; currently suicidal; sitter in line of sight

## 2021-12-01 ENCOUNTER — HOSPITAL ENCOUNTER (EMERGENCY)
Age: 71
Discharge: HOME | End: 2021-12-04
Attending: EMERGENCY MEDICINE
Payer: MEDICARE

## 2021-12-01 DIAGNOSIS — F32.2 SEVERE DEPRESSION (HCC): Primary | ICD-10-CM

## 2021-12-01 DIAGNOSIS — Z65.8 PSYCHOSOCIAL STRESSORS: ICD-10-CM

## 2021-12-01 DIAGNOSIS — G47.9 SLEEP DISTURBANCE: ICD-10-CM

## 2021-12-01 DIAGNOSIS — R63.0 LOSS OF APPETITE: ICD-10-CM

## 2021-12-01 DIAGNOSIS — Z91.14 NONCOMPLIANCE WITH MEDICATION REGIMEN: ICD-10-CM

## 2021-12-01 DIAGNOSIS — R53.83 OTHER FATIGUE: ICD-10-CM

## 2021-12-01 LAB
ANION GAP SERPL CALC-SCNC: 5 MMOL/L (ref 7–16)
BASOPHILS # BLD: 0.1 K/UL (ref 0–0.2)
BASOPHILS NFR BLD: 1 % (ref 0–2)
BUN SERPL-MCNC: 13 MG/DL (ref 8–23)
CALCIUM SERPL-MCNC: 9.8 MG/DL (ref 8.3–10.4)
CHLORIDE SERPL-SCNC: 110 MMOL/L (ref 98–107)
CO2 SERPL-SCNC: 26 MMOL/L (ref 21–32)
CREAT SERPL-MCNC: 0.62 MG/DL (ref 0.6–1)
DIFFERENTIAL METHOD BLD: ABNORMAL
EOSINOPHIL # BLD: 0.1 K/UL (ref 0–0.8)
EOSINOPHIL NFR BLD: 1 % (ref 0.5–7.8)
ERYTHROCYTE [DISTWIDTH] IN BLOOD BY AUTOMATED COUNT: 21.4 % (ref 11.9–14.6)
GLUCOSE SERPL-MCNC: 216 MG/DL (ref 65–100)
HCT VFR BLD AUTO: 32.9 % (ref 35.8–46.3)
HGB BLD-MCNC: 9 G/DL (ref 11.7–15.4)
IMM GRANULOCYTES # BLD AUTO: 0 K/UL (ref 0–0.5)
IMM GRANULOCYTES NFR BLD AUTO: 0 % (ref 0–5)
LYMPHOCYTES # BLD: 0.7 K/UL (ref 0.5–4.6)
LYMPHOCYTES NFR BLD: 15 % (ref 13–44)
MCH RBC QN AUTO: 18.4 PG (ref 26.1–32.9)
MCHC RBC AUTO-ENTMCNC: 27.4 G/DL (ref 31.4–35)
MCV RBC AUTO: 67.3 FL (ref 79.6–97.8)
MONOCYTES # BLD: 0.5 K/UL (ref 0.1–1.3)
MONOCYTES NFR BLD: 10 % (ref 4–12)
NEUTS SEG # BLD: 3.5 K/UL (ref 1.7–8.2)
NEUTS SEG NFR BLD: 73 % (ref 43–78)
NRBC # BLD: 0 K/UL (ref 0–0.2)
PLATELET # BLD AUTO: 206 K/UL (ref 150–450)
PMV BLD AUTO: ABNORMAL FL (ref 9.4–12.3)
POTASSIUM SERPL-SCNC: 4.7 MMOL/L (ref 3.5–5.1)
RBC # BLD AUTO: 4.89 M/UL (ref 4.05–5.2)
SODIUM SERPL-SCNC: 141 MMOL/L (ref 136–145)
WBC # BLD AUTO: 4.9 K/UL (ref 4.3–11.1)

## 2021-12-01 PROCEDURE — 99285 EMERGENCY DEPT VISIT HI MDM: CPT

## 2021-12-01 PROCEDURE — 74011250637 HC RX REV CODE- 250/637: Performed by: EMERGENCY MEDICINE

## 2021-12-01 PROCEDURE — 85025 COMPLETE CBC W/AUTO DIFF WBC: CPT

## 2021-12-01 PROCEDURE — 90792 PSYCH DIAG EVAL W/MED SRVCS: CPT | Performed by: NURSE PRACTITIONER

## 2021-12-01 PROCEDURE — 80048 BASIC METABOLIC PNL TOTAL CA: CPT

## 2021-12-01 RX ORDER — ESCITALOPRAM OXALATE 10 MG/1
10 TABLET ORAL EVERY EVENING
Status: DISCONTINUED | OUTPATIENT
Start: 2021-12-01 | End: 2021-12-04 | Stop reason: HOSPADM

## 2021-12-01 RX ADMIN — ESCITALOPRAM OXALATE 10 MG: 10 TABLET ORAL at 18:27

## 2021-12-01 NOTE — CONSULTS
Psychiatric Evaluation    Date of Service: 2021    Purpose:    Psychiatric Diagnostic Evaluation  Referral Source: Dr. Fuad Erickson  History From:  patient and patient chart    Reason for Consult:  Eval - Depression    History of Present Illness:  Izabela Nguyen is a 80 y.o. female with a history significant for MDD, DO, bereavement, adjustment disorder /  Recent hx of SI and self harm. Pt presented to the ER via EMS c/o depression / per triage note,  states pt is not eating, but stated she doesn't want to hurt herself. Pt evaluated by ER MD.    Pt has a recent hx of presenting to the ER on 21 after cutting wrists and expressing SI / pt was evaluated by telepsychiatry and placed on IVC paperwork / then transferred to [facility] for inpatient psychiatric admission. Per , pt was discharged home / but has been staying in the bed, not going to see her daughter in NH, not taking her medications, not eating. Per , [address] staff came to their home today for intake appt / went into room with pt / came out and stated pt crying and not talking / staff called [facility] and spoke with MD - was told to come to the ER for further evaluation and possible readmission to inpt psychiatry. Per review of notes from [facility] - pt was admitted on 11-10-21 and discharged on 21 with outpt follow up appts with PCP and [facility] / discharge medications include:  Abilify 15 mg daily, Lexapro 20 mg daily , Remeron 15 mg bedtime / Dx:  MDD, DO, Bereavement, Adjustment disorder. To room to meet with pt.  is at bedside. Pt is alert and oriented to name, , place, year. Pt intermittently tearful during conversation, guarded at times / states she still feels overwhelmed and stressed.   She is preoccupied with daughter / worried about daughters cancer dx / surgery (tongue and jaw) / concerned about daughter in the nursing home / worried about what is going to happen to her when she is discharged / stating she doesn't have what she needs for her care / she doesn't know where she is going to live. Pt states she is overwhelmed at being pts payee. Pt limits conversation, not detailing further about current emotions / she states she just wants to \"crawl in a hole. \"  She cannot tell me why she is not taking her regular medical medications / MH medications  / not visiting daughter  / and noted that pt did miss her follow up appt with PCP. She cannot tell me why she stays in the bed ( states pt is in the bed around 22 hrs/day but probably sleeps only around 3-4 hrs of that time). She is not eating. Pt denies SI at this time but cannot verbally contract for safety / she cannot give me any protective factors for suicide. Pts  states he is fearful that if she is discharged and does not \"get help\" she will harm herself / fearful they will find her dead at home.  states he noted a difference in pt approximately 1 1/2 mths ago / and related to daughter / but noted that daughter was diagnosed and had surgery prior to 1 1/2 mths ago / pt unable to tell me any specific trigger around that time. Pt denies any prior MH hx  / denies any other admissions other than recent at [facility].  states pt has difficulty understanding paperwork / what she is reading / pt confirms this but states she has had problems with this all of her life - but cannot detail what this means / does not express anything acute or new. Pt denies HI and AVH - pt does not appear to be responding to any internal stimuli  Long discussion about pt and pts daughter / validating her concerns for her daughter / discussed other options for payee and resources that pt can lean on to help daughter - this discussion does not appear to alleviate pts concerns or stressors.     Psychiatric Review Of Systems:  Sleep: 3-4 hrs / but per  in the bed for approximately 22 hrs out of the day  Appetite: minimal to none   Current suicidal/homicidal ideations: Denies any current SI/HI - but pt will not verbally contract for safety and cannot provide any protective factors for suicide  Current auditory/visual hallucinations: Denies - pt does not appear to be responding to any internal stimuli    Medications:  No current facility-administered medications for this encounter. Current Outpatient Medications:     glucose blood VI test strips (OneTouch Ultra Test) strip, Check blood sugars TID DX:E11.9, Disp: 300 Strip, Rfl: 11    alendronate (FOSAMAX) 70 mg tablet, Take 1 Tablet by mouth every seven (7) days. Remain upright for at least 30 minutes after taking medication  Indications: decreased bone mass following menopause, Disp: 12 Tablet, Rfl: 1    levothyroxine (SYNTHROID) 100 mcg tablet, TAKE ONE TABLET BY MOUTH ONCE DAILY BEFORE BREAKFAST FOR HYPOTHYROIDISM, Disp: 90 Tablet, Rfl: 3    atorvastatin (LIPITOR) 80 mg tablet, Take 1 Tablet by mouth daily. Indications: excessive fat in the blood, Disp: 90 Tablet, Rfl: 3    clopidogreL (PLAVIX) 75 mg tab, Take 1 Tablet by mouth daily. , Disp: 90 Tablet, Rfl: 3    glipiZIDE-metFORMIN (METAGLIP) 5-500 mg per tablet, Take 1 Tablet by mouth Before breakfast and dinner., Disp: 180 Tablet, Rfl: 3    insulin NPH/insulin regular (NOVOLIN 70/30, HUMULIN 70/30) 100 unit/mL (70-30) injection, 50 units qam and 50 units qhs  Indications: type 2 diabetes mellitus, Disp: 100 mL, Rfl: 1    mirtazapine (REMERON) 15 mg tablet, TAKE 1 TABLET EVERY NIGHT, Disp: 90 Tablet, Rfl: 1    fluticasone propionate (FLONASE) 50 mcg/actuation nasal spray, 2 Sprays by Both Nostrils route daily. , Disp: 1 Each, Rfl: 5    Myrbetriq 50 mg ER tablet, Take 1 Tablet by mouth daily. Indications: overactive bladder, Disp: 90 Tablet, Rfl: 3    cholecalciferol (VITAMIN D3) (5000 Units/125 mcg) tab tablet, Take 1 Tab by mouth daily. , Disp: 80 Tab, Rfl: 1    Diabetic Shoes XX, Pt needs pair of diabetic shoes, Disp: 1 Device, Rfl: 0    aspirin delayed-release 81 mg tablet, Take 1 Tab by mouth daily. , Disp: 30 Tab, Rfl: 0    vit a,c & e-lutein-minerals (VISION FORMULA, WITH LUTEIN,) tablet, Take 1 Tab by mouth daily. , Disp: , Rfl:     CALCIUM CARB/VIT D3/MINERALS (CALCIUM-VITAMIN D PO), Take 1 Tab by mouth daily. , Disp: , Rfl:     Allergies: Allergies   Allergen Reactions    Codeine Other (comments)     lightheadedness / headache    Tramadol Unknown (comments)     HEADACHE, ONLY IF TAKEN AT NIGHT        Past Psychiatric History (over the past 6 months, unless otherwise specified):  Previous diagnoses/symptoms: DO / MDD / adjustment d/o, bereavement  Self-injurious behavior/risky thoughts or behaviors (past suicidal ideation/attempt): cutting wrists / SI  Violence/Risk to others (past homicidal ideation/attempt): denies  Current psychiatric provider: recent intake to get established with [id number]  Previous psychiatric medication trials: Abilify, Lexapro, Remeron, Lorazepam  Previous psychiatric hospitalizations: [facility]  Previous therapy: while at [facility]  Previous ECT: none    Past Medical History:  History of head trauma: No  History of seizures: No  History of Surgeries or Hospitalizations:   Past Surgical History:   Procedure Laterality Date    HX COLONOSCOPY      HX DILATION AND CURETTAGE      HX HIP FRACTURE TX Left     HX MT AND BSO      HX TONSILLECTOMY      HX TUBAL LIGATION       Other Past Medical History:   Active Ambulatory Problems     Diagnosis Date Noted    GERD (gastroesophageal reflux disease) 01/07/2015    Hypothyroidism 01/07/2015    Cystocele with rectocele     Pure hypercholesterolemia 02/09/2016    Essential hypertension 02/09/2016    Type 2 diabetes mellitus without complication, with long-term current use of insulin ([facility] 04/06/2017    Peripheral neuropathy 06/12/2018    Vitamin D deficiency 06/12/2018    Other fatigue 06/12/2018    Slurred speech 07/09/2018    Acute UTI (urinary tract infection) 07/09/2018    CVA (cerebral vascular accident) ([facility] 07/09/2018    Anemia 07/19/2018    Acute CVA (cerebrovascular accident) ([address] 09/01/2018    Difficulty with speech 09/01/2018    TIA (transient ischemic attack) 09/02/2018    Closed fracture of proximal end of humerus 01/24/2019    Left upper arm pain 09/25/2019    Type 2 diabetes mellitus with diabetic neuropathy ([facility] 11/14/2019    Fall 11/14/2019    Syncope 11/14/2019    Constipation 01/02/2020    Sleep disturbance 01/02/2020    Rib pain on right side 03/26/2020    Pain of right hip joint 03/26/2020    Peripheral vascular disease ([facility] 12/17/2020    Urge incontinence 05/11/2021     Resolved Ambulatory Problems     Diagnosis Date Noted    Hip fracture, left ([facility] 01/07/2015    Hyperlipidemia 01/07/2015    Hip fracture requiring operative repair ([facility] 01/09/2015    Type 2 diabetes mellitus without complication ([facility] 79/93/7004    Impaired mobility and ADLs     Essential hypertension with goal blood pressure less than 140/90 05/26/2016    Situational depression 09/10/2018    Depression 10/23/2018    Medicare annual wellness visit, subsequent 06/25/2019     Past Medical History:   Diagnosis Date    Chronic musculoskeletal pain     Menopause     Psychiatric disorder          Substance Abuse History (over the past 12 months, unless otherwise specified): Tobacco: Denies  Caffeine: Denies  Alcohol: Denies  Marijuana: Denies  Cocaine: Denies  Opiates: Denies  Benzodiazepine: Denies  Other illicit drug usage: Denies    Legal consequences of substance/alcohol use: No  History of substance/alcohol abuse treatment: No  Readiness for substance/alcohol abuse treatment, if applicable: Not applicable    Past Family History:  Family history of mental health conditions: none  Family history of suicide?  No    Social History:  Childhood: Raised in [place] by mother and father / 3 brothers  Psychological trauma or Abuse: denies   Living Situation/Interest: Lives with   /  states a 24 yr old \"adoptive\" son also lives in the home  Education:  12 th grade  Marital/Committed relationship and parenting history:   x2 / 1 daughter  Occupational History:  Did not discuss  Legal History: none  Spiritual History: did not discuss    EVALUATION    Vitals:   Visit Vitals  BP (!) 109/52 (BP 1 Location: Left upper arm)   Pulse 85   Temp 98.3 °F (36.8 °C)   Resp 15   Ht 5' 7\" (1.702 m)   Wt 145 lb (65.8 kg)   SpO2 98%   BMI 22.71 kg/m²       Labs:   Recent Results (from the past 24 hour(s))   CBC WITH AUTOMATED DIFF    Collection Time: 12/01/21  4:10 PM   Result Value Ref Range    WBC 4.9 4.3 - 11.1 K/uL    RBC 4.89 4.05 - 5.2 M/uL    HGB 9.0 (L) 11.7 - 15.4 g/dL    HCT 32.9 (L) 35.8 - 46.3 %    MCV 67.3 (L) 79.6 - 97.8 FL    MCH 18.4 (L) 26.1 - 32.9 PG    MCHC 27.4 (L) 31.4 - 35.0 g/dL    RDW 21.4 (H) 11.9 - 14.6 %    PLATELET 238 462 - 757 K/uL    MPV Unable to calculate. Recommend adding IPF. 9.4 - 12.3 FL    ABSOLUTE NRBC 0.00 0.0 - 0.2 K/uL    NEUTROPHILS 73 43 - 78 %    LYMPHOCYTES 15 13 - 44 %    MONOCYTES 10 4.0 - 12.0 %    EOSINOPHILS 1 0.5 - 7.8 %    BASOPHILS 1 0.0 - 2.0 %    IMMATURE GRANULOCYTES 0 0.0 - 5.0 %    ABS. NEUTROPHILS 3.5 1.7 - 8.2 K/UL    ABS. LYMPHOCYTES 0.7 0.5 - 4.6 K/UL    ABS. MONOCYTES 0.5 0.1 - 1.3 K/UL    ABS. EOSINOPHILS 0.1 0.0 - 0.8 K/UL    ABS. BASOPHILS 0.1 0.0 - 0.2 K/UL    ABS. IMM.  GRANS. 0.0 0.0 - 0.5 K/UL    DF AUTOMATED     METABOLIC PANEL, BASIC    Collection Time: 12/01/21  4:10 PM   Result Value Ref Range    Sodium 141 136 - 145 mmol/L    Potassium 4.7 3.5 - 5.1 mmol/L    Chloride 110 (H) 98 - 107 mmol/L    CO2 26 21 - 32 mmol/L    Anion gap 5 (L) 7 - 16 mmol/L    Glucose 216 (H) 65 - 100 mg/dL    BUN 13 8 - 23 MG/DL    Creatinine 0.62 0.6 - 1.0 MG/DL    GFR est AA >60 >60 ml/min/1.73m2    GFR est non-AA >60 >60 ml/min/1.73m2    Calcium 9.8 8.3 - 10.4 MG/DL       Medical Review Of Systems:  Psychological ROS: positive for - depression, concentration difficulties, sleep difficulties, anxiety    Mental Status Evaluation:    General Appearance Appears stated age, thin  General Behavior Cooperative, intermittent eye contact  Psychomotor Activity normoactive - no restlessness or tremors noted  Gait                  Did not observe, pt lying in bed  Speech   fluent, normal volume, normal tone, normal rate, but limited  Mood               depressed  Affect    congruent  Thought Process coherent  Thought Content/Perceptual Disturbances Denies suicidal/homicidal ideation but will not verbally contract for safety / denies auditory/visual hallucinations - pt does not appear to be responding to any internal stimuli / negative for paranoia, delusions, grandiosity / positive for preoccupation with daughter and stressors  Cognition                    Alert and oriented to  name, , place, year - impaired concentration  Insight             impaired  Judgment            Impaired    PHQ and DO not completed - pt limited in answering questions during evaluation    ASSESSMENT  Psychiatric Diagnoses:  Severe depression ([facility] [F32.2 (ICD-10-CM)], Sleep disturbance [G47.9 (ICD-10-CM)], Psychosocial stressors [Z65.8 (ICD-10-CM)], Noncompliance with medication regimen [Z91.14 (ICD-10-CM)], Other fatigue [R53.83 (ICD-10-CM)],Loss of appetite [R63.0 (ICD-10-CM)]       Recommendations:  - Pt would benefit from inpatient psychiatric admission for stabilization - pt not eating, sleeping or taking medications - pt will not verbally contract for safety - pt unable to provide any protective factors for suicide - expresses being overwhelmed, hopeless /  has concerns for self harm   - Suicide precautions / Psych safe area  - Can consider starting patient back on Lexapro 10 mg po daily  - Review home medications  - Discussed with MD Bethany Campos Marquita [place] Tonie.  2021  [address] [facility] [address] [place] [address]

## 2021-12-01 NOTE — ED TRIAGE NOTES
Pt states shes here because [facility] says shes depressed. Pt states her  called EMS due to not eating and being depressed. Pt appears with flat affect, states she doesn't want to hurt herself, and doesn't feel like eating.

## 2021-12-02 PROCEDURE — 74011250637 HC RX REV CODE- 250/637: Performed by: EMERGENCY MEDICINE

## 2021-12-02 RX ADMIN — ESCITALOPRAM OXALATE 10 MG: 10 TABLET ORAL at 18:59

## 2021-12-02 NOTE — PROGRESS NOTES
[facility]                                                                                                 PATIENT INFORMATION   Patient Name: Lianet Irizarry, Samm Basurto [place]: [de-identified] Patient MRN: [id number]   Address: [address] [place] [address] Dr Patient CSN: [id number]      Confucianism: Terie Rule   Sex: Female Marital Status:    : 1950 Age:   80 yrs   Home Phone: [phone] 2.00 Mobile Phone:   [phone]     1.00   Race: WHITE/NON- Employer:   Retired   Language: ENGLISH Admitted/Arrived From:   Home [[id number]]   ADMISSION INFORMATION   Admit Date: 2021 Admit Time:  3:19 PM   Patient Class: Emergency Service: Emergency   Admit Source: Other type of health car* Admit Type: EMERGENCY   Admitting Provider:   Attending Provider:     Unit: [address] [place] [address] Emergency Dept Room/Bed: LB72/    Admission Diagnosis:  and codes:      Emergency Complaint:                 Discharge Date:   Discharge Time:     GUARANTOR INFORMATION   Name:  Elijah Daniels Address: [address] [place] [address]  Rel:  Self   Phone: [address]  : 1950   EMERGENCY CONTACTS   Name: Duong Pain:  Mobile: [phone]    Rel: Spouse   COVERAGE INFORMATION   Primary Insurance:   Corey Lopez: Elijah Daniels   Plan Name: [address] [place] [address] Medicare R Sukh Montalvo 46 to Subscriber: Self [01]   Claim Address: [address] [place] [address] Sex: Female      Policy #:  [id number]    Group #: [id number] Group Name:       Auth #: N/A Ins Phone:         Secondary Insurance:   Subscriber:     Plan Name:   Pt Rel to Subscriber:     Claim Address: NA Sex:       Policy #: N/A    Group #: N/A Group Name: N/A   Auth #: N/A Ins Phone:         Accident Date:    Accident Type:     PROVIDER INFORMATION   PCP:         Tea Corado MD PCP Phone:  [phone]   Referring Prov:   No ref.  provider found Referring Phone:  Referring Fax:  N/A      Advanced Directive:  Received Research:     Lab Client:   Enrollment Status:              Printed on 12/2/21  8:03 AM Page      Referral faxed to [facility]

## 2021-12-02 NOTE — ED NOTES
Pt in no distress resting comfortably in room. Charge RN aware pt needs to be transferred to psych room. Room has been sweapt and free of objects.

## 2021-12-02 NOTE — PROGRESS NOTES
pts spouse was in to see pt this morning, made aware of visitation policy for 10 min visit by Gracia ISABEL ED director.  Contact information for spouse Susan Ruiz [phone]

## 2021-12-02 NOTE — PROGRESS NOTES
Chart review complete, pt know to CM staff, pt was recently in ED and admitted to [facility] and dc, instructed to return to ED for readmission to [facility]. CM pending return call from admissions to see if pt will be readmitted or not. Pt has been seen and evaluated by Tele psych and placed on IVC papers. Care Management Interventions  PCP Verified by CM:  Yes  Discharge Durable Medical Equipment: No  Physical Therapy Consult: No  Occupational Therapy Consult: No  Speech Therapy Consult: No  Discharge Location  Discharge Placement: Psychiatric Unit

## 2021-12-02 NOTE — PROGRESS NOTES
Care Management Interventions  PCP Verified by CM: Yes  Discharge Durable Medical Equipment: No  Physical Therapy Consult: No  Occupational Therapy Consult: No  Speech Therapy Consult: No  Discharge Location  Discharge Placement: Psychiatric Unit      [facility] Patient denied by MD     Referral sent to [facility]      Update:      [place]: declined by MD  [facility]: [facility] left   [facility]:  left      Referral sent to [address] [place] [address]    Update:  [facility]: No available beds. CM met with patient at bedside. Patient states she has been eating and drinking, feeling anxious about IP placement. Updated patient on bed availability, currently no bed offers.

## 2021-12-02 NOTE — ED NOTES
Pt. In room sitting quietly, updated pt. With information that we were still trying to place her in tx facility today. Consulted with  RN.

## 2021-12-03 PROCEDURE — 74011250637 HC RX REV CODE- 250/637: Performed by: EMERGENCY MEDICINE

## 2021-12-03 RX ADMIN — ESCITALOPRAM OXALATE 10 MG: 10 TABLET ORAL at 20:00

## 2021-12-03 NOTE — ED NOTES
Constant Observer Yes - Name: Catia Solano   High risk patients are in line of sight at all times Yes   Excess equipment/medical supplies not necessary for the care of the patient removed Yes   All sharp or dangerous objects are removed from room: including but not limited to belts, pens & pencils, needles, medications, cosmetics, lighters, matches, nail files, watches, necklaces, glass objects, razors, razor blades, knives, aerosol sprays, drawstring pants, shoes, cords (telephone, call bells, etc.) cleaning wipes or other cleaning items, aluminum cans, not permanently attached wall décor Yes   Telephone/cell phone removed as well as TV remote (batteries can be swallowed) Yes   Patient belongings removed and labeled at nurses station Yes   Excess linen is removed from room Yes   All plastic bags are removed from the room and replaced with paper trash bags Yes   Patient is in paper scrubs or appropriate gown and using hospital socks with rubber soles Yes   No metal, hard eating utensils or hard plates are on meal tray Yes   Remove all cleaning agents used by Jf's Yes   If Crucifix is hanging on a nail, remove Crucifix as well as the nail Yes       *If any question above is answered \"No,\" documentation is required.

## 2021-12-03 NOTE — ED NOTES
Several attempts were made to allow patient to shower and she refused each time. Denies having any needs at this time.

## 2021-12-03 NOTE — PROGRESS NOTES
Referrals sent to [facility], [address], [address], [address] and Jaylin Celeste to see if bed available. CM will remain available to assist as needed.     IVC papers still good until 12/4/2021 at 1600 pm.

## 2021-12-03 NOTE — PROGRESS NOTES
CYN met with pt this afternoon per pt request, pt requests to go home at this time, explained to pt that CM staff is still waiting on answer to referrals placed for Inpt psych care, when asked it pt still felt like she wanted to harm self she shook her head NO but then when asked if she would make verbal contract with staff to not hurt herself she shook her head NO. CYN made pt aware spouse should be her today to see her and updated her on his call from this morning.

## 2021-12-04 VITALS
SYSTOLIC BLOOD PRESSURE: 104 MMHG | RESPIRATION RATE: 16 BRPM | HEART RATE: 80 BPM | BODY MASS INDEX: 22.76 KG/M2 | OXYGEN SATURATION: 98 % | DIASTOLIC BLOOD PRESSURE: 65 MMHG | TEMPERATURE: 98 F | HEIGHT: 67 IN | WEIGHT: 145 LBS

## 2021-12-04 PROCEDURE — 74011250637 HC RX REV CODE- 250/637: Performed by: EMERGENCY MEDICINE

## 2021-12-04 RX ORDER — ESCITALOPRAM OXALATE 10 MG/1
10 TABLET ORAL
Status: COMPLETED | OUTPATIENT
Start: 2021-12-04 | End: 2021-12-04

## 2021-12-04 RX ORDER — ESCITALOPRAM OXALATE 10 MG/1
10 TABLET ORAL DAILY
Qty: 30 TABLET | Refills: 0 | Status: SHIPPED | OUTPATIENT
Start: 2021-12-04 | End: 2021-12-17 | Stop reason: ALTCHOICE

## 2021-12-04 RX ADMIN — ESCITALOPRAM OXALATE 10 MG: 10 TABLET ORAL at 17:51

## 2021-12-04 NOTE — PROGRESS NOTES
CM received a call from MAAME Saleem) that MD requested CM to meet with patient to see if patient would communicate. Patient initially placed on involuntary commitment papers. CM met with patient and at first patient was very slow to answering questions and became very tearful. Patient states that her daughter has throat cancer and had surgery in June. Patient has remained at [facility] and [address] [place] [address] since surgery. Patient states she's scared and unsure what to do. Patient states she visits her daughter but hasn't been lately. Patient admitted that she's depressed and wasn't taking any medications for depression. Patient stated she she's safe at home and spouse is really good to her. Patient denies any physical / emotional or mental abuse. Patient states she has been to [facility] before and is not interested in going to a facility. Patient was agreeable to returning home with spouse. CM met with MD and discussed conversation that CM had with patient. MD stated that commitment papers will be lifted today and patient can be discharged home. CM made contact with spouse and spoke with him concerning patient being discharged home. Spouse stated he's glad for patient to return home but he would like for her to get out of bed. Spouse confirmed that daughter was in nursing home and they can visit at any time but patient has refused to go and see daughter lately. CM informed spouse that CM will make contact with him on Monday and confirm an appointment with [facility] for patient. Spouse was agreeable to appointment. CM confirmed if purchasing any medications would be any issue. Spouse confirmed  no issue's with purchasing medications. CM informed spouse that patient stated it may be issue's with purchasing medication because they have no funds. Spouse states it no issue with purchasing medication they were fine. Spouse has agreed to transport patient home.       CYN made (RN) Niki Jorgensen aware that patient was being discharged home and spouse will transport patient home.

## 2021-12-04 NOTE — ED NOTES
I have reviewed discharge instructions with the spouse. The spouse verbalized understanding. Patient left ED via Discharge Method: wheelchair to Home with spouse. Opportunity for questions and clarification provided. Patient given 1 scripts. To continue your aftercare when you leave the hospital, you may receive an automated call from our care team to check in on how you are doing. This is a free service and part of our promise to provide the best care and service to meet your aftercare needs.  If you have questions, or wish to unsubscribe from this service please call [phone]. Thank you for Choosing our [address] [place] [address] Emergency Department.

## 2021-12-04 NOTE — ED NOTES
Constant Observer Yes - Name: Lizzette Ponce   Constant Observer Oriented YES   High risk patients are in line of sight at all times Yes   Excess equipment/medical supplies not necessary for the care of the patient removed Yes   All sharp or dangerous objects are removed from room: including but not limited to belts, pens & pencils, needles, medications, cosmetics, lighters, matches, nail files, watches, necklaces, glass objects, razors, razor blades, knives, aerosol sprays, drawstring pants, shoes, cords (telephone, call bells, etc.) cleaning wipes or other cleaning items, aluminum cans, not permanently attached wall décor Yes   Telephone/cell phone removed as well as TV remote (batteries can be swallowed) Yes   Patient belongings removed and labeled at nurses station Yes   Excess linen is removed from room Yes   All plastic bags are removed from the room and replaced with paper trash bags Yes   Patient is in paper scrubs or appropriate gown and using hospital socks with rubber soles Yes   No metal, hard eating utensils or hard plates are on meal tray Yes   Remove all cleaning agents used by Jf's Yes   If Crucifix is hanging on a nail, remove Crucifix as well as the nail Yes       *If any question above is answered \"No,\" documentation is required.

## 2021-12-04 NOTE — ED NOTES
Constant Observer Yes - Name: Torri   Constant Observer Oriented YES   High risk patients are in line of sight at all times Yes   Excess equipment/medical supplies not necessary for the care of the patient removed Yes   All sharp or dangerous objects are removed from room: including but not limited to belts, pens & pencils, needles, medications, cosmetics, lighters, matches, nail files, watches, necklaces, glass objects, razors, razor blades, knives, aerosol sprays, drawstring pants, shoes, cords (telephone, call bells, etc.) cleaning wipes or other cleaning items, aluminum cans, not permanently attached wall décor Yes   Telephone/cell phone removed as well as TV remote (batteries can be swallowed) Yes   Patient belongings removed and labeled at nurses station Yes   Excess linen is removed from room Yes   All plastic bags are removed from the room and replaced with paper trash bags Yes   Patient is in paper scrubs or appropriate gown and using hospital socks with rubber soles Yes   No metal, hard eating utensils or hard plates are on meal tray Yes   Remove all cleaning agents used by Jf's Yes   If Crucifix is hanging on a nail, remove Crucifix as well as the nail Yes       *If any question above is answered \"No,\" documentation is required.

## 2021-12-04 NOTE — ED NOTES
Constant Observer Yes - Name: Caro George   High risk patients are in line of sight at all times Yes   Excess equipment/medical supplies not necessary for the care of the patient removed Yes   All sharp or dangerous objects are removed from room: including but not limited to belts, pens & pencils, needles, medications, cosmetics, lighters, matches, nail files, watches, necklaces, glass objects, razors, razor blades, knives, aerosol sprays, drawstring pants, shoes, cords (telephone, call bells, etc.) cleaning wipes or other cleaning items, aluminum cans, not permanently attached wall décor Yes   Telephone/cell phone removed as well as TV remote (batteries can be swallowed) Yes   Patient belongings removed and labeled at nurses station Yes   Excess linen is removed from room Yes   All plastic bags are removed from the room and replaced with paper trash bags Yes   Patient is in paper scrubs or appropriate gown and using hospital socks with rubber soles Yes   No metal, hard eating utensils or hard plates are on meal tray Yes   Remove all cleaning agents used by Jf's Yes   If Crucifix is hanging on a nail, remove Crucifix as well as the nail Yes       *If any question above is answered \"No,\" documentation is required.

## 2021-12-04 NOTE — DISCHARGE INSTRUCTIONS
Lexapro 10 mg daily  Follow-up with your doctor or mental health.   If unable to do either please contact  at the emergency department for assistance in ongoing care management and planning  May call the Main ER number at [phone] for them to connect you to

## 2021-12-04 NOTE — ED NOTES
With a quiet withdrawn affect but is talked more freely with . They feels though patient is stable and she does not have any self or other harming intent.   She has not been on Remeron for some time by her report to me we will place her on Lexapro which she had a dose or 2 of in our department further decision-making on meds will be with follow-up

## 2021-12-06 NOTE — PROGRESS NOTES
CM received call from spouse this am states pt was dc home on yesterday and would like to have home care come out to see her, requested referral be sent [facility], referral placed as requested. For [facility] for RN, PT/OT order/clinical faxed.

## 2021-12-06 NOTE — PROGRESS NOTES
[address] Christie Garcia (IXFML [id number])  Ancillary Consult  Date and Time: 12/6/2021  9:38 AM Department: [facility] EMERGENCY DEPT Released By: Ghassan Michaels RN (auto-released) Authorizing: Ta Cheng MD       Collection Information        Order Providers    Authorizing   Ta Cheng            Order Information    Order Date/Time Release Date/Time Start Date/Time End Date/Time   12/06/21 09:38 AM 12/06/21 09:38 AM 12/06/21 09:45 AM 12/06/21 09:45 AM   CSN:   [id number]       Order Details    Frequency Duration Priority Order Class   ONE TIME 1  occurrence STAT Hospital Performed     Reprint OrderRequisition-Outpatient Only    IP [facility] (Order #[id number]) on 12/6/21     Original Order    Ordered On Ordered By    12/6/2021  9:38 AM Ghassan Michaels RN             Order Questions    Question Answer   Reason for Consult: RN, PT/OT-- [facility]            Verbal Order Info    Action Created on Order Mode Entered by Comment Responsible Provider Signed by Signed on   Ordering 12/06/21 0938 Verbal with readback   Ta Cheng MD       Additional Information    Associated Reports   View Encounter   Priority and Order Details   NPI Information       Electronically signed by Authorizing Provider: Ta Cheng [id number]     Order start date/time: 12/6/2021 9:45 AM  Electronically signed by Co-signing Provider (if required):                     Order Report    Order Details    Tracking Links    Cosign Tracking Order Transmittal Tracking     Referral faxed to [facility] for RN, PT/OT

## 2022-03-18 PROBLEM — R55 SYNCOPE: Status: ACTIVE | Noted: 2019-11-14

## 2022-03-18 PROBLEM — R53.83 OTHER FATIGUE: Status: ACTIVE | Noted: 2018-06-12

## 2022-03-18 PROBLEM — I73.9 PERIPHERAL VASCULAR DISEASE (HCC): Status: ACTIVE | Noted: 2020-12-17

## 2022-03-18 PROBLEM — R47.81 SLURRED SPEECH: Status: ACTIVE | Noted: 2018-07-09

## 2022-03-18 PROBLEM — N39.0 ACUTE UTI (URINARY TRACT INFECTION): Status: ACTIVE | Noted: 2018-07-09

## 2022-03-19 PROBLEM — K59.00 CONSTIPATION: Status: ACTIVE | Noted: 2020-01-02
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
## 2022-03-19 PROBLEM — M79.622 LEFT UPPER ARM PAIN: Status: ACTIVE | Noted: 2019-09-25

## 2022-03-19 PROBLEM — G45.9 TIA (TRANSIENT ISCHEMIC ATTACK): Status: ACTIVE | Noted: 2018-09-02

## 2022-03-19 PROBLEM — G47.9 SLEEP DISTURBANCE: Status: ACTIVE | Noted: 2020-01-02

## 2022-03-19 PROBLEM — D64.9 ANEMIA: Status: ACTIVE | Noted: 2018-07-19

## 2022-03-19 PROBLEM — S42.209A CLOSED FRACTURE OF PROXIMAL END OF HUMERUS: Status: ACTIVE | Noted: 2019-01-24

## 2022-03-19 PROBLEM — M25.551 PAIN OF RIGHT HIP JOINT: Status: ACTIVE | Noted: 2020-03-26

## 2022-03-19 PROBLEM — I63.9 CVA (CEREBRAL VASCULAR ACCIDENT) (HCC): Status: ACTIVE | Noted: 2018-07-09

## 2022-03-19 PROBLEM — I63.9 ACUTE CVA (CEREBROVASCULAR ACCIDENT) (HCC): Status: ACTIVE | Noted: 2018-09-01

## 2022-03-19 PROBLEM — N39.41 URGE INCONTINENCE: Status: ACTIVE | Noted: 2021-05-11

## 2022-03-19 PROBLEM — E55.9 VITAMIN D DEFICIENCY: Status: ACTIVE | Noted: 2018-06-12

## 2022-03-19 PROBLEM — E11.40 TYPE 2 DIABETES MELLITUS WITH DIABETIC NEUROPATHY (HCC): Status: ACTIVE | Noted: 2019-11-14

## 2022-03-19 PROBLEM — R07.81 RIB PAIN ON RIGHT SIDE: Status: ACTIVE | Noted: 2020-03-26

## 2022-03-19 PROBLEM — G62.9 PERIPHERAL NEUROPATHY: Status: ACTIVE | Noted: 2018-06-12

## 2022-03-20 PROBLEM — R47.9 DIFFICULTY WITH SPEECH: Status: ACTIVE | Noted: 2018-09-01

## 2022-03-20 PROBLEM — E11.9 TYPE 2 DIABETES MELLITUS WITHOUT COMPLICATION, WITH LONG-TERM CURRENT USE OF INSULIN (HCC): Status: ACTIVE | Noted: 2017-04-06

## 2022-03-20 PROBLEM — Z79.4 TYPE 2 DIABETES MELLITUS WITHOUT COMPLICATION, WITH LONG-TERM CURRENT USE OF INSULIN (HCC): Status: ACTIVE | Noted: 2017-04-06

## 2022-10-20 DIAGNOSIS — Z95.818 STATUS POST PLACEMENT OF IMPLANTABLE LOOP RECORDER: ICD-10-CM

## 2022-10-20 DIAGNOSIS — I63.9 ACUTE CVA (CEREBROVASCULAR ACCIDENT) (HCC): Primary | ICD-10-CM

## 2022-10-21 DIAGNOSIS — Z95.818 STATUS POST PLACEMENT OF IMPLANTABLE LOOP RECORDER: Primary | ICD-10-CM

## 2022-11-02 PROCEDURE — 93298 REM INTERROG DEV EVAL SCRMS: CPT | Performed by: INTERNAL MEDICINE

## 2022-11-02 PROCEDURE — G2066 INTER DEVC REMOTE 30D: HCPCS | Performed by: INTERNAL MEDICINE

## 2022-12-01 DIAGNOSIS — Z95.818 STATUS POST PLACEMENT OF IMPLANTABLE LOOP RECORDER: ICD-10-CM

## 2022-12-04 PROCEDURE — 93298 REM INTERROG DEV EVAL SCRMS: CPT | Performed by: INTERNAL MEDICINE

## 2022-12-04 PROCEDURE — G2066 INTER DEVC REMOTE 30D: HCPCS | Performed by: INTERNAL MEDICINE

## 2023-01-11 DIAGNOSIS — Z95.818 STATUS POST PLACEMENT OF IMPLANTABLE LOOP RECORDER: ICD-10-CM

## 2023-01-11 DIAGNOSIS — I63.9 ACUTE CVA (CEREBROVASCULAR ACCIDENT) (HCC): ICD-10-CM

## 2023-03-13 DIAGNOSIS — I63.9 ACUTE CVA (CEREBROVASCULAR ACCIDENT) (HCC): ICD-10-CM

## 2023-03-13 DIAGNOSIS — Z95.818 STATUS POST PLACEMENT OF IMPLANTABLE LOOP RECORDER: ICD-10-CM

## 2023-04-23 ENCOUNTER — APPOINTMENT (OUTPATIENT)
Dept: CT IMAGING | Age: 73
DRG: 433 | End: 2023-04-23
Payer: MEDICARE

## 2023-04-23 ENCOUNTER — APPOINTMENT (OUTPATIENT)
Dept: GENERAL RADIOLOGY | Age: 73
DRG: 433 | End: 2023-04-23
Payer: MEDICARE

## 2023-04-23 ENCOUNTER — HOSPITAL ENCOUNTER (INPATIENT)
Age: 73
LOS: 4 days | Discharge: NURSING, REHAB OR LONG-TERM CARE | DRG: 433 | End: 2023-04-27
Attending: EMERGENCY MEDICINE | Admitting: FAMILY MEDICINE
Payer: MEDICARE

## 2023-04-23 DIAGNOSIS — Z09 HOSPITAL DISCHARGE FOLLOW-UP: ICD-10-CM

## 2023-04-23 DIAGNOSIS — K74.60 CIRRHOSIS OF LIVER WITH ASCITES, UNSPECIFIED HEPATIC CIRRHOSIS TYPE (HCC): ICD-10-CM

## 2023-04-23 DIAGNOSIS — R17 JAUNDICE: Primary | ICD-10-CM

## 2023-04-23 DIAGNOSIS — S42.001A CLOSED NONDISPLACED FRACTURE OF RIGHT CLAVICLE, UNSPECIFIED PART OF CLAVICLE, INITIAL ENCOUNTER: ICD-10-CM

## 2023-04-23 DIAGNOSIS — R18.8 CIRRHOSIS OF LIVER WITH ASCITES, UNSPECIFIED HEPATIC CIRRHOSIS TYPE (HCC): ICD-10-CM

## 2023-04-23 PROBLEM — E11.40 TYPE 2 DIABETES MELLITUS WITH DIABETIC NEUROPATHY (HCC): Status: ACTIVE | Noted: 2019-11-14

## 2023-04-23 PROBLEM — K59.00 CONSTIPATION: Status: ACTIVE | Noted: 2020-01-02

## 2023-04-23 PROBLEM — R47.9 DIFFICULTY WITH SPEECH: Status: ACTIVE | Noted: 2018-09-01

## 2023-04-23 PROBLEM — G62.9 PERIPHERAL NEUROPATHY: Status: ACTIVE | Noted: 2018-06-12

## 2023-04-23 PROBLEM — I73.9 PERIPHERAL VASCULAR DISEASE (HCC): Status: ACTIVE | Noted: 2020-12-17

## 2023-04-23 PROBLEM — R53.83 OTHER FATIGUE: Status: ACTIVE | Noted: 2018-06-12

## 2023-04-23 LAB
ALBUMIN SERPL-MCNC: 2.4 G/DL (ref 3.2–4.6)
ALBUMIN/GLOB SERPL: 0.6 (ref 0.4–1.6)
ALP SERPL-CCNC: 247 U/L (ref 50–136)
ALT SERPL-CCNC: 74 U/L (ref 12–65)
AMMONIA PLAS-SCNC: <11 UMOL/L (ref 11–32)
ANION GAP SERPL CALC-SCNC: 6 MMOL/L (ref 2–11)
APAP SERPL-MCNC: <2 UG/ML (ref 10–30)
APPEARANCE UR: CLEAR
AST SERPL-CCNC: 79 U/L (ref 15–37)
BACTERIA URNS QL MICRO: ABNORMAL /HPF
BASOPHILS # BLD: 0 K/UL (ref 0–0.2)
BASOPHILS NFR BLD: 1 % (ref 0–2)
BILIRUB SERPL-MCNC: 12.8 MG/DL (ref 0.2–1.1)
BILIRUB UR QL: ABNORMAL
BUN SERPL-MCNC: 11 MG/DL (ref 8–23)
CALCIUM SERPL-MCNC: 9.4 MG/DL (ref 8.3–10.4)
CASTS URNS QL MICRO: 0 /LPF
CHLORIDE SERPL-SCNC: 104 MMOL/L (ref 101–110)
CO2 SERPL-SCNC: 22 MMOL/L (ref 21–32)
COLOR UR: ABNORMAL
CREAT SERPL-MCNC: 0.4 MG/DL (ref 0.6–1)
CRYSTALS URNS QL MICRO: 0 /LPF
DIFFERENTIAL METHOD BLD: ABNORMAL
EKG ATRIAL RATE: 83 BPM
EKG DIAGNOSIS: NORMAL
EKG P AXIS: 29 DEGREES
EKG P-R INTERVAL: 150 MS
EKG Q-T INTERVAL: 407 MS
EKG QRS DURATION: 98 MS
EKG QTC CALCULATION (BAZETT): 479 MS
EKG R AXIS: -41 DEGREES
EKG T AXIS: 29 DEGREES
EKG VENTRICULAR RATE: 83 BPM
EOSINOPHIL # BLD: 0.1 K/UL (ref 0–0.8)
EOSINOPHIL NFR BLD: 3 % (ref 0.5–7.8)
EPI CELLS #/AREA URNS HPF: ABNORMAL /HPF
ERYTHROCYTE [DISTWIDTH] IN BLOOD BY AUTOMATED COUNT: 18.1 % (ref 11.9–14.6)
EST. AVERAGE GLUCOSE BLD GHB EST-MCNC: 283 MG/DL
GLOBULIN SER CALC-MCNC: 4.1 G/DL (ref 2.8–4.5)
GLUCOSE BLD STRIP.AUTO-MCNC: 221 MG/DL (ref 65–100)
GLUCOSE SERPL-MCNC: 264 MG/DL (ref 65–100)
GLUCOSE UR STRIP.AUTO-MCNC: NEGATIVE MG/DL
HAV IGM SER QL: NONREACTIVE
HBA1C MFR BLD: 11.5 % (ref 4.8–5.6)
HBV CORE IGM SER QL: NONREACTIVE
HBV SURFACE AG SER QL: NONREACTIVE
HCT VFR BLD AUTO: 37.6 % (ref 35.8–46.3)
HCV AB SER QL: NONREACTIVE
HGB BLD-MCNC: 12 G/DL (ref 11.7–15.4)
HGB UR QL STRIP: NEGATIVE
IMM GRANULOCYTES # BLD AUTO: 0.1 K/UL (ref 0–0.5)
IMM GRANULOCYTES NFR BLD AUTO: 2 % (ref 0–5)
INR PPP: 1
KETONES UR QL STRIP.AUTO: 40 MG/DL
LACTATE SERPL-SCNC: 0.5 MMOL/L (ref 0.4–2)
LACTATE SERPL-SCNC: 0.6 MMOL/L (ref 0.4–2)
LEUKOCYTE ESTERASE UR QL STRIP.AUTO: ABNORMAL
LIPASE SERPL-CCNC: 119 U/L (ref 73–393)
LYMPHOCYTES # BLD: 0.4 K/UL (ref 0.5–4.6)
LYMPHOCYTES NFR BLD: 12 % (ref 13–44)
MCH RBC QN AUTO: 26.1 PG (ref 26.1–32.9)
MCHC RBC AUTO-ENTMCNC: 31.9 G/DL (ref 31.4–35)
MCV RBC AUTO: 81.7 FL (ref 82–102)
MONOCYTES # BLD: 0.4 K/UL (ref 0.1–1.3)
MONOCYTES NFR BLD: 12 % (ref 4–12)
MUCOUS THREADS URNS QL MICRO: 0 /LPF
NEUTS SEG # BLD: 2.5 K/UL (ref 1.7–8.2)
NEUTS SEG NFR BLD: 72 % (ref 43–78)
NITRITE UR QL STRIP.AUTO: NEGATIVE
NRBC # BLD: 0 K/UL (ref 0–0.2)
OTHER OBSERVATIONS: ABNORMAL
PH UR STRIP: 5.5 (ref 5–9)
PLATELET # BLD AUTO: 113 K/UL (ref 150–450)
PMV BLD AUTO: ABNORMAL FL (ref 9.4–12.3)
POTASSIUM SERPL-SCNC: 4.3 MMOL/L (ref 3.5–5.1)
PROT SERPL-MCNC: 6.5 G/DL (ref 6.3–8.2)
PROT UR STRIP-MCNC: NEGATIVE MG/DL
PROTHROMBIN TIME: 13.9 SEC (ref 12.6–14.3)
RBC # BLD AUTO: 4.6 M/UL (ref 4.05–5.2)
RBC #/AREA URNS HPF: 0 /HPF
SERVICE CMNT-IMP: ABNORMAL
SODIUM SERPL-SCNC: 132 MMOL/L (ref 133–143)
SP GR UR REFRACTOMETRY: 1.04 (ref 1–1.02)
T4 FREE SERPL-MCNC: 0.9 NG/DL (ref 0.78–1.46)
TSH W FREE THYROID IF ABNORMAL: 15.9 UIU/ML (ref 0.36–3.74)
URINE CULTURE IF INDICATED: ABNORMAL
UROBILINOGEN UR QL STRIP.AUTO: 1 EU/DL (ref 0.2–1)
WBC # BLD AUTO: 3.6 K/UL (ref 4.3–11.1)
WBC URNS QL MICRO: ABNORMAL /HPF

## 2023-04-23 PROCEDURE — 80053 COMPREHEN METABOLIC PANEL: CPT

## 2023-04-23 PROCEDURE — 36415 COLL VENOUS BLD VENIPUNCTURE: CPT

## 2023-04-23 PROCEDURE — 1100000000 HC RM PRIVATE

## 2023-04-23 PROCEDURE — 2580000003 HC RX 258: Performed by: FAMILY MEDICINE

## 2023-04-23 PROCEDURE — 85610 PROTHROMBIN TIME: CPT

## 2023-04-23 PROCEDURE — 83605 ASSAY OF LACTIC ACID: CPT

## 2023-04-23 PROCEDURE — 94761 N-INVAS EAR/PLS OXIMETRY MLT: CPT

## 2023-04-23 PROCEDURE — 80074 ACUTE HEPATITIS PANEL: CPT

## 2023-04-23 PROCEDURE — 73030 X-RAY EXAM OF SHOULDER: CPT

## 2023-04-23 PROCEDURE — 82140 ASSAY OF AMMONIA: CPT

## 2023-04-23 PROCEDURE — 6370000000 HC RX 637 (ALT 250 FOR IP): Performed by: FAMILY MEDICINE

## 2023-04-23 PROCEDURE — 2580000003 HC RX 258: Performed by: EMERGENCY MEDICINE

## 2023-04-23 PROCEDURE — 93005 ELECTROCARDIOGRAM TRACING: CPT | Performed by: EMERGENCY MEDICINE

## 2023-04-23 PROCEDURE — 84439 ASSAY OF FREE THYROXINE: CPT

## 2023-04-23 PROCEDURE — 80143 DRUG ASSAY ACETAMINOPHEN: CPT

## 2023-04-23 PROCEDURE — 74177 CT ABD & PELVIS W/CONTRAST: CPT

## 2023-04-23 PROCEDURE — 83690 ASSAY OF LIPASE: CPT

## 2023-04-23 PROCEDURE — 81001 URINALYSIS AUTO W/SCOPE: CPT

## 2023-04-23 PROCEDURE — 71045 X-RAY EXAM CHEST 1 VIEW: CPT

## 2023-04-23 PROCEDURE — 83036 HEMOGLOBIN GLYCOSYLATED A1C: CPT

## 2023-04-23 PROCEDURE — 84443 ASSAY THYROID STIM HORMONE: CPT

## 2023-04-23 PROCEDURE — 2500000003 HC RX 250 WO HCPCS: Performed by: FAMILY MEDICINE

## 2023-04-23 PROCEDURE — 82962 GLUCOSE BLOOD TEST: CPT

## 2023-04-23 PROCEDURE — 85025 COMPLETE CBC W/AUTO DIFF WBC: CPT

## 2023-04-23 PROCEDURE — 99285 EMERGENCY DEPT VISIT HI MDM: CPT

## 2023-04-23 PROCEDURE — 6360000004 HC RX CONTRAST MEDICATION: Performed by: EMERGENCY MEDICINE

## 2023-04-23 RX ORDER — LEVOTHYROXINE SODIUM 0.1 MG/1
100 TABLET ORAL DAILY
Status: DISCONTINUED | OUTPATIENT
Start: 2023-04-23 | End: 2023-04-27 | Stop reason: HOSPADM

## 2023-04-23 RX ORDER — SODIUM CHLORIDE 9 MG/ML
INJECTION, SOLUTION INTRAVENOUS PRN
Status: DISCONTINUED | OUTPATIENT
Start: 2023-04-23 | End: 2023-04-27 | Stop reason: HOSPADM

## 2023-04-23 RX ORDER — INSULIN LISPRO 100 [IU]/ML
0-8 INJECTION, SOLUTION INTRAVENOUS; SUBCUTANEOUS
Status: DISCONTINUED | OUTPATIENT
Start: 2023-04-23 | End: 2023-04-27 | Stop reason: HOSPADM

## 2023-04-23 RX ORDER — DEXTROSE MONOHYDRATE 100 MG/ML
INJECTION, SOLUTION INTRAVENOUS CONTINUOUS PRN
Status: DISCONTINUED | OUTPATIENT
Start: 2023-04-23 | End: 2023-04-27 | Stop reason: HOSPADM

## 2023-04-23 RX ORDER — INSULIN LISPRO 100 [IU]/ML
0-4 INJECTION, SOLUTION INTRAVENOUS; SUBCUTANEOUS NIGHTLY
Status: DISCONTINUED | OUTPATIENT
Start: 2023-04-23 | End: 2023-04-27 | Stop reason: HOSPADM

## 2023-04-23 RX ORDER — POLYETHYLENE GLYCOL 3350 17 G/17G
17 POWDER, FOR SOLUTION ORAL DAILY
Status: DISCONTINUED | OUTPATIENT
Start: 2023-04-23 | End: 2023-04-24

## 2023-04-23 RX ORDER — POLYETHYLENE GLYCOL 3350 17 G/17G
17 POWDER, FOR SOLUTION ORAL DAILY PRN
Status: DISCONTINUED | OUTPATIENT
Start: 2023-04-23 | End: 2023-04-24 | Stop reason: SDUPTHER

## 2023-04-23 RX ORDER — SODIUM CHLORIDE 9 MG/ML
INJECTION, SOLUTION INTRAVENOUS CONTINUOUS
Status: ACTIVE | OUTPATIENT
Start: 2023-04-23 | End: 2023-04-24

## 2023-04-23 RX ORDER — 0.9 % SODIUM CHLORIDE 0.9 %
1000 INTRAVENOUS SOLUTION INTRAVENOUS ONCE
Status: COMPLETED | OUTPATIENT
Start: 2023-04-23 | End: 2023-04-23

## 2023-04-23 RX ORDER — SODIUM CHLORIDE 0.9 % (FLUSH) 0.9 %
5-40 SYRINGE (ML) INJECTION EVERY 12 HOURS SCHEDULED
Status: DISCONTINUED | OUTPATIENT
Start: 2023-04-23 | End: 2023-04-27 | Stop reason: HOSPADM

## 2023-04-23 RX ORDER — SODIUM CHLORIDE 0.9 % (FLUSH) 0.9 %
5-40 SYRINGE (ML) INJECTION PRN
Status: DISCONTINUED | OUTPATIENT
Start: 2023-04-23 | End: 2023-04-27 | Stop reason: HOSPADM

## 2023-04-23 RX ADMIN — SODIUM CHLORIDE: 9 INJECTION, SOLUTION INTRAVENOUS at 19:48

## 2023-04-23 RX ADMIN — SODIUM CHLORIDE, PRESERVATIVE FREE 10 ML: 5 INJECTION INTRAVENOUS at 20:51

## 2023-04-23 RX ADMIN — TUBERCULIN PURIFIED PROTEIN DERIVATIVE 5 UNITS: 5 INJECTION, SOLUTION INTRADERMAL at 20:50

## 2023-04-23 RX ADMIN — IOPAMIDOL 100 ML: 755 INJECTION, SOLUTION INTRAVENOUS at 15:35

## 2023-04-23 RX ADMIN — SODIUM CHLORIDE 1000 ML: 9 INJECTION, SOLUTION INTRAVENOUS at 15:09

## 2023-04-23 RX ADMIN — NYSTATIN 500000 UNITS: 100000 SUSPENSION ORAL at 21:42

## 2023-04-23 ASSESSMENT — PAIN - FUNCTIONAL ASSESSMENT: PAIN_FUNCTIONAL_ASSESSMENT: NONE - DENIES PAIN

## 2023-04-23 ASSESSMENT — PAIN SCALES - GENERAL
PAINLEVEL_OUTOF10: 0
PAINLEVEL_OUTOF10: 0

## 2023-04-23 ASSESSMENT — ENCOUNTER SYMPTOMS
GASTROINTESTINAL NEGATIVE: 1
RESPIRATORY NEGATIVE: 1
BACK PAIN: 0

## 2023-04-24 PROBLEM — E44.0 MODERATE PROTEIN-CALORIE MALNUTRITION (HCC): Status: ACTIVE | Noted: 2023-04-24

## 2023-04-24 LAB
ALBUMIN SERPL-MCNC: 2.1 G/DL (ref 3.2–4.6)
ALBUMIN/GLOB SERPL: 0.6 (ref 0.4–1.6)
ALP SERPL-CCNC: 213 U/L (ref 50–136)
ALT SERPL-CCNC: 54 U/L (ref 12–65)
ANION GAP SERPL CALC-SCNC: 6 MMOL/L (ref 2–11)
AST SERPL-CCNC: 44 U/L (ref 15–37)
BASOPHILS # BLD: 0 K/UL (ref 0–0.2)
BASOPHILS NFR BLD: 1 % (ref 0–2)
BILIRUB DIRECT SERPL-MCNC: 7.9 MG/DL
BILIRUB SERPL-MCNC: 9.8 MG/DL (ref 0.2–1.1)
BUN SERPL-MCNC: 8 MG/DL (ref 8–23)
CALCIUM SERPL-MCNC: 8.6 MG/DL (ref 8.3–10.4)
CHLORIDE SERPL-SCNC: 107 MMOL/L (ref 101–110)
CO2 SERPL-SCNC: 23 MMOL/L (ref 21–32)
CREAT SERPL-MCNC: 0.4 MG/DL (ref 0.6–1)
DIFFERENTIAL METHOD BLD: ABNORMAL
EOSINOPHIL # BLD: 0.1 K/UL (ref 0–0.8)
EOSINOPHIL NFR BLD: 4 % (ref 0.5–7.8)
ERYTHROCYTE [DISTWIDTH] IN BLOOD BY AUTOMATED COUNT: 18.7 % (ref 11.9–14.6)
FERRITIN SERPL-MCNC: 156 NG/ML (ref 8–388)
GLOBULIN SER CALC-MCNC: 3.6 G/DL (ref 2.8–4.5)
GLUCOSE BLD STRIP.AUTO-MCNC: 191 MG/DL (ref 65–100)
GLUCOSE BLD STRIP.AUTO-MCNC: 217 MG/DL (ref 65–100)
GLUCOSE BLD STRIP.AUTO-MCNC: 300 MG/DL (ref 65–100)
GLUCOSE BLD STRIP.AUTO-MCNC: 306 MG/DL (ref 65–100)
GLUCOSE BLD STRIP.AUTO-MCNC: 313 MG/DL (ref 65–100)
GLUCOSE SERPL-MCNC: 195 MG/DL (ref 65–100)
HAV IGM SER QL: NONREACTIVE
HBV CORE IGM SER QL: NONREACTIVE
HBV SURFACE AG SER QL: NONREACTIVE
HCT VFR BLD AUTO: 34.5 % (ref 35.8–46.3)
HCV AB SER QL: NONREACTIVE
HGB BLD-MCNC: 11 G/DL (ref 11.7–15.4)
IMM GRANULOCYTES # BLD AUTO: 0.1 K/UL (ref 0–0.5)
IMM GRANULOCYTES NFR BLD AUTO: 2 % (ref 0–5)
IRON SATN MFR SERPL: 14 %
IRON SATN MFR SERPL: 25 %
IRON SERPL-MCNC: 40 UG/DL (ref 35–150)
IRON SERPL-MCNC: 75 UG/DL (ref 35–150)
LYMPHOCYTES # BLD: 0.5 K/UL (ref 0.5–4.6)
LYMPHOCYTES NFR BLD: 14 % (ref 13–44)
MAGNESIUM SERPL-MCNC: 1.6 MG/DL (ref 1.8–2.4)
MCH RBC QN AUTO: 26.1 PG (ref 26.1–32.9)
MCHC RBC AUTO-ENTMCNC: 31.9 G/DL (ref 31.4–35)
MCV RBC AUTO: 81.9 FL (ref 82–102)
MM INDURATION, POC: 0 MM (ref 0–5)
MONOCYTES # BLD: 0.4 K/UL (ref 0.1–1.3)
MONOCYTES NFR BLD: 13 % (ref 4–12)
NEUTS SEG # BLD: 2.3 K/UL (ref 1.7–8.2)
NEUTS SEG NFR BLD: 66 % (ref 43–78)
NRBC # BLD: 0 K/UL (ref 0–0.2)
PLATELET # BLD AUTO: 116 K/UL (ref 150–450)
PMV BLD AUTO: 11.1 FL (ref 9.4–12.3)
POTASSIUM SERPL-SCNC: 3.2 MMOL/L (ref 3.5–5.1)
PPD, POC: NEGATIVE
PROT SERPL-MCNC: 5.7 G/DL (ref 6.3–8.2)
RBC # BLD AUTO: 4.21 M/UL (ref 4.05–5.2)
SERVICE CMNT-IMP: ABNORMAL
SODIUM SERPL-SCNC: 136 MMOL/L (ref 133–143)
TIBC SERPL-MCNC: 290 UG/DL (ref 250–450)
TIBC SERPL-MCNC: 301 UG/DL (ref 250–450)
WBC # BLD AUTO: 3.4 K/UL (ref 4.3–11.1)

## 2023-04-24 PROCEDURE — 82103 ALPHA-1-ANTITRYPSIN TOTAL: CPT

## 2023-04-24 PROCEDURE — 83516 IMMUNOASSAY NONANTIBODY: CPT

## 2023-04-24 PROCEDURE — 80074 ACUTE HEPATITIS PANEL: CPT

## 2023-04-24 PROCEDURE — 6370000000 HC RX 637 (ALT 250 FOR IP): Performed by: FAMILY MEDICINE

## 2023-04-24 PROCEDURE — 83550 IRON BINDING TEST: CPT

## 2023-04-24 PROCEDURE — 97166 OT EVAL MOD COMPLEX 45 MIN: CPT

## 2023-04-24 PROCEDURE — 82962 GLUCOSE BLOOD TEST: CPT

## 2023-04-24 PROCEDURE — 86037 ANCA TITER EACH ANTIBODY: CPT

## 2023-04-24 PROCEDURE — 97535 SELF CARE MNGMENT TRAINING: CPT

## 2023-04-24 PROCEDURE — 97161 PT EVAL LOW COMPLEX 20 MIN: CPT

## 2023-04-24 PROCEDURE — 82248 BILIRUBIN DIRECT: CPT

## 2023-04-24 PROCEDURE — 6370000000 HC RX 637 (ALT 250 FOR IP): Performed by: HOSPITALIST

## 2023-04-24 PROCEDURE — 86381 MITOCHONDRIAL ANTIBODY EACH: CPT

## 2023-04-24 PROCEDURE — 86038 ANTINUCLEAR ANTIBODIES: CPT

## 2023-04-24 PROCEDURE — 97530 THERAPEUTIC ACTIVITIES: CPT

## 2023-04-24 PROCEDURE — 86015 ACTIN ANTIBODY EACH: CPT

## 2023-04-24 PROCEDURE — 83735 ASSAY OF MAGNESIUM: CPT

## 2023-04-24 PROCEDURE — 85025 COMPLETE CBC W/AUTO DIFF WBC: CPT

## 2023-04-24 PROCEDURE — 36415 COLL VENOUS BLD VENIPUNCTURE: CPT

## 2023-04-24 PROCEDURE — 82390 ASSAY OF CERULOPLASMIN: CPT

## 2023-04-24 PROCEDURE — 82728 ASSAY OF FERRITIN: CPT

## 2023-04-24 PROCEDURE — 2580000003 HC RX 258: Performed by: FAMILY MEDICINE

## 2023-04-24 PROCEDURE — 82104 ALPHA-1-ANTITRYPSIN PHENO: CPT

## 2023-04-24 PROCEDURE — 1100000000 HC RM PRIVATE

## 2023-04-24 PROCEDURE — 83540 ASSAY OF IRON: CPT

## 2023-04-24 PROCEDURE — 80053 COMPREHEN METABOLIC PANEL: CPT

## 2023-04-24 PROCEDURE — 82105 ALPHA-FETOPROTEIN SERUM: CPT

## 2023-04-24 RX ORDER — BISACODYL 10 MG
10 SUPPOSITORY, RECTAL RECTAL DAILY PRN
Status: DISCONTINUED | OUTPATIENT
Start: 2023-04-24 | End: 2023-04-24 | Stop reason: SDUPTHER

## 2023-04-24 RX ORDER — POLYETHYLENE GLYCOL 3350 17 G/17G
17 POWDER, FOR SOLUTION ORAL DAILY PRN
Status: DISCONTINUED | OUTPATIENT
Start: 2023-04-24 | End: 2023-04-27 | Stop reason: HOSPADM

## 2023-04-24 RX ORDER — LANOLIN ALCOHOL/MO/W.PET/CERES
400 CREAM (GRAM) TOPICAL 2 TIMES DAILY
Status: DISCONTINUED | OUTPATIENT
Start: 2023-04-24 | End: 2023-04-27 | Stop reason: HOSPADM

## 2023-04-24 RX ORDER — SENNA PLUS 8.6 MG/1
2 TABLET ORAL 2 TIMES DAILY
Status: DISPENSED | OUTPATIENT
Start: 2023-04-24 | End: 2023-04-26

## 2023-04-24 RX ORDER — POTASSIUM CHLORIDE 20 MEQ/1
40 TABLET, EXTENDED RELEASE ORAL
Status: COMPLETED | OUTPATIENT
Start: 2023-04-24 | End: 2023-04-25

## 2023-04-24 RX ORDER — INSULIN LISPRO 100 [IU]/ML
5 INJECTION, SOLUTION INTRAVENOUS; SUBCUTANEOUS
Status: DISCONTINUED | OUTPATIENT
Start: 2023-04-24 | End: 2023-04-25 | Stop reason: SDUPTHER

## 2023-04-24 RX ADMIN — INSULIN LISPRO 5 UNITS: 100 INJECTION, SOLUTION INTRAVENOUS; SUBCUTANEOUS at 17:31

## 2023-04-24 RX ADMIN — INSULIN LISPRO 2 UNITS: 100 INJECTION, SOLUTION INTRAVENOUS; SUBCUTANEOUS at 12:40

## 2023-04-24 RX ADMIN — NYSTATIN 500000 UNITS: 100000 SUSPENSION ORAL at 21:07

## 2023-04-24 RX ADMIN — MAGNESIUM GLUCONATE 500 MG ORAL TABLET 400 MG: 500 TABLET ORAL at 21:07

## 2023-04-24 RX ADMIN — MAGNESIUM GLUCONATE 500 MG ORAL TABLET 400 MG: 500 TABLET ORAL at 08:54

## 2023-04-24 RX ADMIN — SODIUM CHLORIDE, PRESERVATIVE FREE 10 ML: 5 INJECTION INTRAVENOUS at 21:10

## 2023-04-24 RX ADMIN — INSULIN LISPRO 5 UNITS: 100 INJECTION, SOLUTION INTRAVENOUS; SUBCUTANEOUS at 08:56

## 2023-04-24 RX ADMIN — SENNOSIDES 17.2 MG: 8.6 TABLET, FILM COATED ORAL at 11:50

## 2023-04-24 RX ADMIN — SENNOSIDES 17.2 MG: 8.6 TABLET, FILM COATED ORAL at 21:07

## 2023-04-24 RX ADMIN — LEVOTHYROXINE SODIUM 100 MCG: 0.1 TABLET ORAL at 06:04

## 2023-04-24 RX ADMIN — SODIUM CHLORIDE: 9 INJECTION, SOLUTION INTRAVENOUS at 05:14

## 2023-04-24 RX ADMIN — NYSTATIN 500000 UNITS: 100000 SUSPENSION ORAL at 14:04

## 2023-04-24 RX ADMIN — INSULIN LISPRO 6 UNITS: 100 INJECTION, SOLUTION INTRAVENOUS; SUBCUTANEOUS at 17:31

## 2023-04-24 RX ADMIN — NYSTATIN 500000 UNITS: 100000 SUSPENSION ORAL at 08:48

## 2023-04-24 RX ADMIN — INSULIN LISPRO 4 UNITS: 100 INJECTION, SOLUTION INTRAVENOUS; SUBCUTANEOUS at 21:06

## 2023-04-24 RX ADMIN — INSULIN LISPRO 5 UNITS: 100 INJECTION, SOLUTION INTRAVENOUS; SUBCUTANEOUS at 12:40

## 2023-04-24 RX ADMIN — NYSTATIN 500000 UNITS: 100000 SUSPENSION ORAL at 17:08

## 2023-04-24 RX ADMIN — POTASSIUM CHLORIDE 40 MEQ: 1500 TABLET, EXTENDED RELEASE ORAL at 08:54

## 2023-04-24 ASSESSMENT — PAIN SCALES - GENERAL: PAINLEVEL_OUTOF10: 0

## 2023-04-24 NOTE — CONSULTS
Reconciliation   mirabegron (MYRBETRIQ) 50 MG TB24 Take 50 mg by mouth daily  Patient not taking: Reported on 4/23/2023 7/27/21   Ar Automatic Reconciliation   mirtazapine (REMERON) 15 MG tablet Take 15 mg by mouth  Patient not taking: Reported on 4/23/2023    Ar Automatic Reconciliation        insulin lispro (HUMALOG) injection vial 5 Units, TID WC  magnesium oxide (MAG-OX) tablet 400 mg, BID  potassium chloride (KLOR-CON M) extended release tablet 40 mEq, Daily with breakfast  polyethylene glycol (GLYCOLAX) packet 17 g, Daily PRN  senna (SENOKOT) tablet 17.2 mg, BID  sodium chloride flush 0.9 % injection 5-40 mL, 2 times per day  sodium chloride flush 0.9 % injection 5-40 mL, PRN  0.9 % sodium chloride infusion, PRN  levothyroxine (SYNTHROID) tablet 100 mcg, Daily  insulin lispro (HUMALOG) injection vial 0-8 Units, TID WC  insulin lispro (HUMALOG) injection vial 0-4 Units, Nightly  glucose chewable tablet 16 g, PRN  dextrose bolus 10% 125 mL, PRN   Or  dextrose bolus 10% 250 mL, PRN  glucagon (rDNA) injection 1 mg, PRN  dextrose 10 % infusion, Continuous PRN  nystatin (MYCOSTATIN) 456841 UNIT/ML suspension 500,000 Units, 4x Daily  tuberculin injection 5 Units, Once        Allergies   Codeine and Tramadol    Social History     Social History       Tobacco History       Smoking Status  Never      Smokeless Tobacco Use  Never              Alcohol History       Alcohol Use Status  No              Drug Use       Drug Use Status  No              Sexual Activity       Sexually Active  Not Asked Birth Control/Protection  None                    Family History     Family History   Problem Relation Age of Onset    Cancer Mother     Cancer Father     No Known Problems Maternal Grandmother     No Known Problems Maternal Grandfather     No Known Problems Paternal Grandmother     No Known Problems Paternal Grandfather     Breast Cancer Neg Hx        Review of Systems   Review of Systems     Physical Exam   /65   Pulse 87

## 2023-04-24 NOTE — CARE COORDINATION
4:34PM:  [facility] prior authorization  ref # [id number].      4:13PM: This CM met with pt, spouse, and additional family member at bedside per their request to speak with a CM. Advised them that [facility] did offer STR bed for pt at discharge. They are agreeable to bed offer and accept - this CM selected facility. Prior authorization to be initiated. We discussed additional home services available to pt after discharge from SNF. Reviewed the role and recommendation of home health at discharge; any DME recommended would be arranged by SNF; medications will be managed at the facility. Spouse reports pt does not have a PCP because she does not leave the house- this CM recommended [address] referral and they are agreeable. Referral to be made. This CM also provided pt and spouse brochures for private caregiving companies. Finally, they are interested in [facility] referral.  Referral to be made. No additional CM needs at this time. Will continue to monitor.

## 2023-04-24 NOTE — PLAN OF CARE
Problem: Discharge Planning  Goal: Discharge to home or other facility with appropriate resources  4/24/2023 0754 by Shital Jordan RN  Outcome: Progressing     Problem: Safety - Adult  Goal: Free from fall injury  4/24/2023 0754 by Shital Jordan RN  Outcome: Progressing     Problem: Skin/Tissue Integrity  Goal: Absence of new skin breakdown  Description: 1. Monitor for areas of redness and/or skin breakdown  2. Assess vascular access sites hourly  3. Every 4-6 hours minimum:  Change oxygen saturation probe site  4. Every 4-6 hours:  If on nasal continuous positive airway pressure, respiratory therapy assess nares and determine need for appliance change or resting period.   4/24/2023 0754 by Shital Jordan RN  Outcome: Progressing     Problem: Risk for Elopement  Goal: Patient will not exit the unit/facility without proper excort  4/24/2023 0754 by Shital Jordan RN  Outcome: Progressing

## 2023-04-24 NOTE — CARE COORDINATION
04/24/23 1502   Service Assessment   Patient Orientation Alert and Oriented   Cognition Alert   History Provided By Patient   Primary [address] [facility] [address] is: Legal Next of Kin   PCP Verified by CM Yes   Last Visit to PCP Within last 3 months   Prior Functional Level Independent in ADLs/IADLs   Current Functional Level Assistance with the following:;Dressing; Toileting;Mobility   Can patient return to prior living arrangement No   Ability to make needs known: Good   Family able to assist with home care needs: Yes   Would you like for me to discuss the discharge plan with any other family members/significant others, and if so, who? No   Financial Resources None   Community Resources None   Discharge Planning   Type of Residence Skilled Nursing Facility   Living Arrangements Spouse/Significant Other   Patient expects to be discharged to: Skilled nursing facility   History of falls? 1   Services At/After Discharge   Transition of Care Consult (CM Consult) SNF   Services At/After Discharge Jose Angel Timothy (SNF)   Condition of Participation: Discharge Planning   The Plan for Transition of Care is related to the following treatment goals: SNF   The Patient and/or Patient Representative was provided with a Choice of Provider? Patient   The Patient and/Or Patient Representative agree with the Discharge Plan? Yes   Freedom of Choice list was provided with basic dialogue that supports the patient's individualized plan of care/goals, treatment preferences, and shares the quality data associated with the providers? Yes     Assessment completed with patient at bedside. Patient alert and oriented. Patient lives with her spouse. Patient reports fall at home. Patient is aware of Pts recommendation of SNF. Patient is agreeable. Referrals sent .

## 2023-04-25 ENCOUNTER — APPOINTMENT (OUTPATIENT)
Dept: ULTRASOUND IMAGING | Age: 73
DRG: 433 | End: 2023-04-25
Payer: MEDICARE

## 2023-04-25 LAB
AFP-TM SERPL-MCNC: 24.2 NG/ML
ALBUMIN SERPL-MCNC: 2.1 G/DL (ref 3.2–4.6)
ALBUMIN/GLOB SERPL: 0.5 (ref 0.4–1.6)
ALP SERPL-CCNC: 254 U/L (ref 50–136)
ALT SERPL-CCNC: 57 U/L (ref 12–65)
ANA SER QL: NEGATIVE
ANION GAP SERPL CALC-SCNC: 6 MMOL/L (ref 2–11)
AST SERPL-CCNC: 47 U/L (ref 15–37)
BILIRUB DIRECT SERPL-MCNC: 5.7 MG/DL
BILIRUB SERPL-MCNC: 7.8 MG/DL (ref 0.2–1.1)
BUN SERPL-MCNC: 8 MG/DL (ref 8–23)
CALCIUM SERPL-MCNC: 9.3 MG/DL (ref 8.3–10.4)
CERULOPLASMIN SERPL-MCNC: 32.5 MG/DL (ref 19–39)
CHLORIDE SERPL-SCNC: 104 MMOL/L (ref 101–110)
CO2 SERPL-SCNC: 23 MMOL/L (ref 21–32)
CREAT SERPL-MCNC: 0.4 MG/DL (ref 0.6–1)
GLOBULIN SER CALC-MCNC: 4 G/DL (ref 2.8–4.5)
GLUCOSE BLD STRIP.AUTO-MCNC: 287 MG/DL (ref 65–100)
GLUCOSE BLD STRIP.AUTO-MCNC: 303 MG/DL (ref 65–100)
GLUCOSE BLD STRIP.AUTO-MCNC: 306 MG/DL (ref 65–100)
GLUCOSE BLD STRIP.AUTO-MCNC: 383 MG/DL (ref 65–100)
GLUCOSE SERPL-MCNC: 310 MG/DL (ref 65–100)
INR PPP: 1
MITOCHONDRIA M2 IGG SER-ACNC: <20 UNITS (ref 0–20)
MM INDURATION, POC: NORMAL MM (ref 0–5)
POTASSIUM SERPL-SCNC: 3.9 MMOL/L (ref 3.5–5.1)
PPD, POC: NEGATIVE
PROT SERPL-MCNC: 6.1 G/DL (ref 6.3–8.2)
PROTHROMBIN TIME: 13.9 SEC (ref 12.6–14.3)
SERVICE CMNT-IMP: ABNORMAL
SODIUM SERPL-SCNC: 133 MMOL/L (ref 133–143)

## 2023-04-25 PROCEDURE — 6370000000 HC RX 637 (ALT 250 FOR IP): Performed by: HOSPITALIST

## 2023-04-25 PROCEDURE — 1100000000 HC RM PRIVATE

## 2023-04-25 PROCEDURE — 82962 GLUCOSE BLOOD TEST: CPT

## 2023-04-25 PROCEDURE — 82248 BILIRUBIN DIRECT: CPT

## 2023-04-25 PROCEDURE — 76700 US EXAM ABDOM COMPLETE: CPT

## 2023-04-25 PROCEDURE — 36415 COLL VENOUS BLD VENIPUNCTURE: CPT

## 2023-04-25 PROCEDURE — 2580000003 HC RX 258: Performed by: FAMILY MEDICINE

## 2023-04-25 PROCEDURE — 85610 PROTHROMBIN TIME: CPT

## 2023-04-25 PROCEDURE — 6370000000 HC RX 637 (ALT 250 FOR IP): Performed by: FAMILY MEDICINE

## 2023-04-25 PROCEDURE — 80053 COMPREHEN METABOLIC PANEL: CPT

## 2023-04-25 RX ORDER — INSULIN LISPRO 100 [IU]/ML
3 INJECTION, SOLUTION INTRAVENOUS; SUBCUTANEOUS
Status: DISCONTINUED | OUTPATIENT
Start: 2023-04-25 | End: 2023-04-27 | Stop reason: HOSPADM

## 2023-04-25 RX ORDER — INSULIN GLARGINE 100 [IU]/ML
12 INJECTION, SOLUTION SUBCUTANEOUS DAILY
Status: DISCONTINUED | OUTPATIENT
Start: 2023-04-25 | End: 2023-04-27 | Stop reason: HOSPADM

## 2023-04-25 RX ADMIN — POTASSIUM CHLORIDE 40 MEQ: 1500 TABLET, EXTENDED RELEASE ORAL at 07:57

## 2023-04-25 RX ADMIN — INSULIN LISPRO 3 UNITS: 100 INJECTION, SOLUTION INTRAVENOUS; SUBCUTANEOUS at 17:34

## 2023-04-25 RX ADMIN — INSULIN GLARGINE 12 UNITS: 100 INJECTION, SOLUTION SUBCUTANEOUS at 11:42

## 2023-04-25 RX ADMIN — NYSTATIN 500000 UNITS: 100000 SUSPENSION ORAL at 13:55

## 2023-04-25 RX ADMIN — INSULIN LISPRO 3 UNITS: 100 INJECTION, SOLUTION INTRAVENOUS; SUBCUTANEOUS at 11:45

## 2023-04-25 RX ADMIN — MAGNESIUM GLUCONATE 500 MG ORAL TABLET 400 MG: 500 TABLET ORAL at 20:27

## 2023-04-25 RX ADMIN — INSULIN LISPRO 6 UNITS: 100 INJECTION, SOLUTION INTRAVENOUS; SUBCUTANEOUS at 11:46

## 2023-04-25 RX ADMIN — INSULIN LISPRO 8 UNITS: 100 INJECTION, SOLUTION INTRAVENOUS; SUBCUTANEOUS at 17:34

## 2023-04-25 RX ADMIN — INSULIN LISPRO 4 UNITS: 100 INJECTION, SOLUTION INTRAVENOUS; SUBCUTANEOUS at 22:16

## 2023-04-25 RX ADMIN — NYSTATIN 500000 UNITS: 100000 SUSPENSION ORAL at 20:27

## 2023-04-25 RX ADMIN — NYSTATIN 500000 UNITS: 100000 SUSPENSION ORAL at 17:33

## 2023-04-25 RX ADMIN — INSULIN LISPRO 4 UNITS: 100 INJECTION, SOLUTION INTRAVENOUS; SUBCUTANEOUS at 08:01

## 2023-04-25 RX ADMIN — MAGNESIUM GLUCONATE 500 MG ORAL TABLET 400 MG: 500 TABLET ORAL at 07:57

## 2023-04-25 RX ADMIN — INSULIN LISPRO 5 UNITS: 100 INJECTION, SOLUTION INTRAVENOUS; SUBCUTANEOUS at 08:00

## 2023-04-25 RX ADMIN — SODIUM CHLORIDE, PRESERVATIVE FREE 10 ML: 5 INJECTION INTRAVENOUS at 07:58

## 2023-04-25 RX ADMIN — SENNOSIDES 17.2 MG: 8.6 TABLET, FILM COATED ORAL at 07:56

## 2023-04-25 RX ADMIN — NYSTATIN 500000 UNITS: 100000 SUSPENSION ORAL at 07:55

## 2023-04-25 RX ADMIN — SODIUM CHLORIDE, PRESERVATIVE FREE 10 ML: 5 INJECTION INTRAVENOUS at 20:27

## 2023-04-25 RX ADMIN — LEVOTHYROXINE SODIUM 100 MCG: 0.1 TABLET ORAL at 06:00

## 2023-04-25 ASSESSMENT — PAIN SCALES - GENERAL: PAINLEVEL_OUTOF10: 0

## 2023-04-25 NOTE — PLAN OF CARE
Problem: Discharge Planning  Goal: Discharge to home or other facility with appropriate resources  4/25/2023 0726 by Casey Garcia RN  Outcome: Progressing     Problem: Safety - Adult  Goal: Free from fall injury  4/25/2023 0726 by Casey Garcia RN  Outcome: Progressing     Problem: Skin/Tissue Integrity  Goal: Absence of new skin breakdown  Description: 1. Monitor for areas of redness and/or skin breakdown  2. Assess vascular access sites hourly  3. Every 4-6 hours minimum:  Change oxygen saturation probe site  4. Every 4-6 hours:  If on nasal continuous positive airway pressure, respiratory therapy assess nares and determine need for appliance change or resting period.   4/25/2023 0726 by Casey Garcia RN  Outcome: Progressing     Problem: Risk for Elopement  Goal: Patient will not exit the unit/facility without proper excort  4/25/2023 0726 by Casey Garcia RN  Outcome: Progressing

## 2023-04-25 NOTE — PLAN OF CARE
Problem: Discharge Planning  Goal: Discharge to home or other facility with appropriate resources  Outcome: Progressing     Problem: Safety - Adult  Goal: Free from fall injury  Outcome: Progressing     Problem: Skin/Tissue Integrity  Goal: Absence of new skin breakdown  Description: 1. Monitor for areas of redness and/or skin breakdown  2. Assess vascular access sites hourly  3. Every 4-6 hours minimum:  Change oxygen saturation probe site  4. Every 4-6 hours:  If on nasal continuous positive airway pressure, respiratory therapy assess nares and determine need for appliance change or resting period.   Outcome: Progressing     Problem: Risk for Elopement  Goal: Patient will not exit the unit/facility without proper excort  Outcome: Progressing     Problem: Chronic Conditions and Co-morbidities  Goal: Patient's chronic conditions and co-morbidity symptoms are monitored and maintained or improved  Outcome: Progressing

## 2023-04-25 NOTE — CARE COORDINATION
Patient's [employer] authorization has been approved for admission to [facility]. Per report, patient is scheduled to have an EGD tomorrow 4.26.2023. Patient will potentially be ready for d/c to [facility] after EGD. Updated [facility] liaison. CM will follow patient's plan of care and assist with supportive care referrals pending patient's clinical progress. Please consult case management if specific needs arise.

## 2023-04-26 ENCOUNTER — ANESTHESIA EVENT (OUTPATIENT)
Dept: ENDOSCOPY | Age: 73
DRG: 433 | End: 2023-04-26
Payer: MEDICARE

## 2023-04-26 ENCOUNTER — ANESTHESIA (OUTPATIENT)
Dept: ENDOSCOPY | Age: 73
DRG: 433 | End: 2023-04-26
Payer: MEDICARE

## 2023-04-26 LAB
A1AT PHENOTYP SERPL IFE: ABNORMAL
A1AT SERPL-MCNC: 198 MG/DL (ref 101–187)
ALBUMIN SERPL-MCNC: 2.1 G/DL (ref 3.2–4.6)
ALBUMIN/GLOB SERPL: 0.6 (ref 0.4–1.6)
ALP SERPL-CCNC: 238 U/L (ref 50–136)
ALT SERPL-CCNC: 49 U/L (ref 12–65)
ANION GAP SERPL CALC-SCNC: 5 MMOL/L (ref 2–11)
AST SERPL-CCNC: 43 U/L (ref 15–37)
BILIRUB SERPL-MCNC: 6.7 MG/DL (ref 0.2–1.1)
BUN SERPL-MCNC: 8 MG/DL (ref 8–23)
C-ANCA TITR SER IF: ABNORMAL TITER
CALCIUM SERPL-MCNC: 8.9 MG/DL (ref 8.3–10.4)
CHLORIDE SERPL-SCNC: 103 MMOL/L (ref 101–110)
CO2 SERPL-SCNC: 24 MMOL/L (ref 21–32)
CREAT SERPL-MCNC: 0.3 MG/DL (ref 0.6–1)
GLOBULIN SER CALC-MCNC: 3.8 G/DL (ref 2.8–4.5)
GLUCOSE BLD STRIP.AUTO-MCNC: 183 MG/DL (ref 65–100)
GLUCOSE BLD STRIP.AUTO-MCNC: 205 MG/DL (ref 65–100)
GLUCOSE BLD STRIP.AUTO-MCNC: 210 MG/DL (ref 65–100)
GLUCOSE BLD STRIP.AUTO-MCNC: 244 MG/DL (ref 65–100)
GLUCOSE SERPL-MCNC: 225 MG/DL (ref 65–100)
MYELOPEROXIDASE AB SER IA-ACNC: <0.2 UNITS (ref 0–0.9)
P-ANCA ATYPICAL TITR SER IF: ABNORMAL TITER
P-ANCA TITR SER IF: ABNORMAL TITER
POTASSIUM SERPL-SCNC: 3.9 MMOL/L (ref 3.5–5.1)
PROT SERPL-MCNC: 5.9 G/DL (ref 6.3–8.2)
PROTEINASE3 AB SER IA-ACNC: <0.2 UNITS (ref 0–0.9)
SERVICE CMNT-IMP: ABNORMAL
SODIUM SERPL-SCNC: 132 MMOL/L (ref 133–143)

## 2023-04-26 PROCEDURE — 2580000003 HC RX 258: Performed by: FAMILY MEDICINE

## 2023-04-26 PROCEDURE — 7100000011 HC PHASE II RECOVERY - ADDTL 15 MIN: Performed by: INTERNAL MEDICINE

## 2023-04-26 PROCEDURE — 6370000000 HC RX 637 (ALT 250 FOR IP): Performed by: HOSPITALIST

## 2023-04-26 PROCEDURE — 2500000003 HC RX 250 WO HCPCS: Performed by: NURSE ANESTHETIST, CERTIFIED REGISTERED

## 2023-04-26 PROCEDURE — 82962 GLUCOSE BLOOD TEST: CPT

## 2023-04-26 PROCEDURE — 36415 COLL VENOUS BLD VENIPUNCTURE: CPT

## 2023-04-26 PROCEDURE — 2709999900 HC NON-CHARGEABLE SUPPLY: Performed by: INTERNAL MEDICINE

## 2023-04-26 PROCEDURE — 3700000001 HC ADD 15 MINUTES (ANESTHESIA): Performed by: INTERNAL MEDICINE

## 2023-04-26 PROCEDURE — 2580000003 HC RX 258: Performed by: INTERNAL MEDICINE

## 2023-04-26 PROCEDURE — 80053 COMPREHEN METABOLIC PANEL: CPT

## 2023-04-26 PROCEDURE — 7100000010 HC PHASE II RECOVERY - FIRST 15 MIN: Performed by: INTERNAL MEDICINE

## 2023-04-26 PROCEDURE — 2720000010 HC SURG SUPPLY STERILE: Performed by: INTERNAL MEDICINE

## 2023-04-26 PROCEDURE — 6370000000 HC RX 637 (ALT 250 FOR IP): Performed by: FAMILY MEDICINE

## 2023-04-26 PROCEDURE — 1100000000 HC RM PRIVATE

## 2023-04-26 PROCEDURE — 2500000003 HC RX 250 WO HCPCS: Performed by: INTERNAL MEDICINE

## 2023-04-26 PROCEDURE — 6370000000 HC RX 637 (ALT 250 FOR IP): Performed by: INTERNAL MEDICINE

## 2023-04-26 PROCEDURE — 3700000000 HC ANESTHESIA ATTENDED CARE: Performed by: INTERNAL MEDICINE

## 2023-04-26 PROCEDURE — 06L38CZ OCCLUSION OF ESOPHAGEAL VEIN WITH EXTRALUMINAL DEVICE, VIA NATURAL OR ARTIFICIAL OPENING ENDOSCOPIC: ICD-10-PCS | Performed by: INTERNAL MEDICINE

## 2023-04-26 PROCEDURE — 3609017100 HC EGD: Performed by: INTERNAL MEDICINE

## 2023-04-26 PROCEDURE — 6360000002 HC RX W HCPCS: Performed by: NURSE ANESTHETIST, CERTIFIED REGISTERED

## 2023-04-26 RX ORDER — DIMETHICONE, CAMPHOR (SYNTHETIC), MENTHOL, AND PHENOL 1.1; .5; .625; .5 G/100G; G/100G; G/100G; G/100G
OINTMENT TOPICAL PRN
Status: DISCONTINUED | OUTPATIENT
Start: 2023-04-26 | End: 2023-04-27 | Stop reason: HOSPADM

## 2023-04-26 RX ORDER — DEXTROSE, SODIUM CHLORIDE, AND POTASSIUM CHLORIDE 5; .45; .075 G/100ML; G/100ML; G/100ML
INJECTION INTRAVENOUS CONTINUOUS
Status: DISCONTINUED | OUTPATIENT
Start: 2023-04-26 | End: 2023-04-27 | Stop reason: HOSPADM

## 2023-04-26 RX ORDER — PROPOFOL 10 MG/ML
INJECTION, EMULSION INTRAVENOUS PRN
Status: DISCONTINUED | OUTPATIENT
Start: 2023-04-26 | End: 2023-04-26 | Stop reason: SDUPTHER

## 2023-04-26 RX ORDER — LIDOCAINE HYDROCHLORIDE 20 MG/ML
INJECTION, SOLUTION EPIDURAL; INFILTRATION; INTRACAUDAL; PERINEURAL PRN
Status: DISCONTINUED | OUTPATIENT
Start: 2023-04-26 | End: 2023-04-26 | Stop reason: SDUPTHER

## 2023-04-26 RX ORDER — PROPOFOL 10 MG/ML
INJECTION, EMULSION INTRAVENOUS CONTINUOUS PRN
Status: DISCONTINUED | OUTPATIENT
Start: 2023-04-26 | End: 2023-04-26 | Stop reason: SDUPTHER

## 2023-04-26 RX ORDER — SODIUM CHLORIDE, SODIUM LACTATE, POTASSIUM CHLORIDE, CALCIUM CHLORIDE 600; 310; 30; 20 MG/100ML; MG/100ML; MG/100ML; MG/100ML
INJECTION, SOLUTION INTRAVENOUS CONTINUOUS PRN
Status: COMPLETED | OUTPATIENT
Start: 2023-04-26 | End: 2023-04-26

## 2023-04-26 RX ADMIN — LEVOTHYROXINE SODIUM 100 MCG: 0.1 TABLET ORAL at 05:36

## 2023-04-26 RX ADMIN — NYSTATIN 500000 UNITS: 100000 SUSPENSION ORAL at 09:20

## 2023-04-26 RX ADMIN — PROPOFOL 180 MCG/KG/MIN: 10 INJECTION, EMULSION INTRAVENOUS at 11:55

## 2023-04-26 RX ADMIN — MAGNESIUM GLUCONATE 500 MG ORAL TABLET 400 MG: 500 TABLET ORAL at 09:21

## 2023-04-26 RX ADMIN — SODIUM CHLORIDE, PRESERVATIVE FREE 10 ML: 5 INJECTION INTRAVENOUS at 09:21

## 2023-04-26 RX ADMIN — INSULIN GLARGINE 12 UNITS: 100 INJECTION, SOLUTION SUBCUTANEOUS at 09:00

## 2023-04-26 RX ADMIN — LIDOCAINE HYDROCHLORIDE 60 MG: 20 INJECTION, SOLUTION EPIDURAL; INFILTRATION; INTRACAUDAL; PERINEURAL at 11:55

## 2023-04-26 RX ADMIN — INSULIN LISPRO 3 UNITS: 100 INJECTION, SOLUTION INTRAVENOUS; SUBCUTANEOUS at 09:00

## 2023-04-26 RX ADMIN — PROPOFOL 20 MG: 10 INJECTION, EMULSION INTRAVENOUS at 11:57

## 2023-04-26 RX ADMIN — INSULIN LISPRO 2 UNITS: 100 INJECTION, SOLUTION INTRAVENOUS; SUBCUTANEOUS at 17:31

## 2023-04-26 RX ADMIN — PROPOFOL 40 MG: 10 INJECTION, EMULSION INTRAVENOUS at 11:55

## 2023-04-26 RX ADMIN — POTASSIUM CHLORIDE, DEXTROSE MONOHYDRATE AND SODIUM CHLORIDE: 75; 5; 450 INJECTION, SOLUTION INTRAVENOUS at 15:32

## 2023-04-26 RX ADMIN — PROPOFOL 20 MG: 10 INJECTION, EMULSION INTRAVENOUS at 11:58

## 2023-04-26 RX ADMIN — INSULIN LISPRO 3 UNITS: 100 INJECTION, SOLUTION INTRAVENOUS; SUBCUTANEOUS at 17:32

## 2023-04-26 RX ADMIN — Medication: at 17:55

## 2023-04-26 ASSESSMENT — PAIN - FUNCTIONAL ASSESSMENT: PAIN_FUNCTIONAL_ASSESSMENT: NONE - DENIES PAIN

## 2023-04-26 ASSESSMENT — PAIN SCALES - GENERAL
PAINLEVEL_OUTOF10: 0

## 2023-04-26 NOTE — OP NOTE
Operative Note      Patient: Jose Teague  YOB: 1950  MRN: [id number]    Date of Procedure: 4/26/2023    Pre-Op Diagnosis Codes:     * Esophageal varices without bleeding, unspecified esophageal varices type ([facility] [address] [I85.00]    Post-Op Diagnosis:  Large Grade 3 esophageal varices s/p banding x 4, and Mild portal hypertensive gastropathy. Procedure(s):  EGD ESOPHAGOGASTRODUODENOSCOPY LIGATION (BANDING) *room 205    Surgeon(s):  Teresa Carlos MD    Assistant:   * No surgical staff found *    Anesthesia: Monitor Anesthesia Care    Estimated Blood Loss (mL): Minimal    Complications: None    Specimens:   * No specimens in log *    Implants:  * No implants in log *      Drains:   External Urinary Catheter (Active)   Site Assessment Clean,dry & intact 04/25/23 1950   Placement Repositioned 04/25/23 1843   Catheter Care Catheter/Wick replaced;Suction Canister/Tubing changed 04/25/23 1450   Perineal Care Yes 04/25/23 1950   Suction 40 mmgHg continuous 04/25/23 1950   Urine Color Karey 04/25/23 1950   Urine Appearance Cloudy 04/25/23 1950   Output (mL) 325 mL 04/26/23 0422       Findings:   1-Normal upper esophagus. 2-Large, grade 3 esophageal varices with red nas signs and cherry red spots in distal esophagus. No active bleeding. Esophageal banding x 4 bands placed. GEJ-38 cm. 3-No gastric varices noted. 4-Mild diffuse portal hypertensive gastropathy. The rest of the stomach normal.  5-Brunners gland hyperplasia noted in duodenal bulb. The rest of the examined duodenum normal.     Detailed Description of Procedure:   After informed consent was obtained from the patient the upper endoscope was placed in the oropharynx and advanced under direct visualization to the 2nd portion of the duodenum. The scope was removed from the duodenum examining the entire mucosa all the way back to the oropharynx, with retroflexion in the gastric body to view the gastric cardia and fundus.  The scope was then

## 2023-04-26 NOTE — ANESTHESIA PRE PROCEDURE
Department of Anesthesiology  Preprocedure Note       Name:  Artemio Strange   Age:  68 y.o.  :  1950                                          MRN:  [id number]         Date:  2023      Surgeon: Carol Joseph):  Alexa Lock MD    Procedure: Procedure(s):  EGD ESOPHAGOGASTRODUODENOSCOPY *room 205    Medications prior to admission:   Prior to Admission medications    Medication Sig Start Date End Date Taking?  Authorizing Provider   alendronate (FOSAMAX) 70 MG tablet Take 70 mg by mouth every 7 days  Patient not taking: Reported on 2023 10/22/21   Ar Automatic Reconciliation   ARIPiprazole (ABILIFY) 15 MG tablet Take 7.5 mg by mouth daily  Patient not taking: Reported on 21   Ar Automatic Reconciliation   aspirin 81 MG EC tablet Take 81 mg by mouth daily  Patient not taking: Reported on 2023   Ar Automatic Reconciliation   atorvastatin (LIPITOR) 40 MG tablet Take 40 mg by mouth daily  Patient not taking: Reported on 21   Ar Automatic Reconciliation   vitamin D3 (CHOLECALCIFEROL) 125 MCG (5000 UT) TABS tablet Take 5,000 Units by mouth daily  Patient not taking: Reported on 20   Ar Automatic Reconciliation   clopidogrel (PLAVIX) 75 MG tablet Take 75 mg by mouth daily  Patient not taking: Reported on 2023 10/22/21   Ar Automatic Reconciliation   escitalopram (LEXAPRO) 20 MG tablet Take 20 mg by mouth daily  Patient not taking: Reported on 2023    Ar Automatic Reconciliation   ferrous sulfate (IRON 325) 325 (65 Fe) MG tablet Take 325 mg by mouth every morning (before breakfast)  Patient not taking: Reported on 21   Ar Automatic Reconciliation   fluticasone (FLONASE) 50 MCG/ACT nasal spray 2 sprays by Nasal route daily  Patient not taking: Reported on 2023   Ar Automatic Reconciliation   ibuprofen (ADVIL;MOTRIN) 600 MG tablet Take 600 mg by mouth 2 times daily  Patient not taking: Reported on 2023
Crosby...

## 2023-04-26 NOTE — ANESTHESIA POSTPROCEDURE EVALUATION
Department of Anesthesiology  Postprocedure Note    Patient: Cynthia Marina  MRN: [id number]  YOB: 1950  Date of evaluation: 4/26/2023      Procedure Summary     Date: 04/26/23 Room / Location: [facility] ENDO 05 / [facility] ENDOSCOPY    Anesthesia Start: 1148 Anesthesia Stop: 1214    Procedure: EGD ESOPHAGOGASTRODUODENOSCOPY LIGATION (BANDING) *room 205 (Upper GI Region) Diagnosis:       Esophageal varices without bleeding, unspecified esophageal varices type (HCC)      (Esophageal varices without bleeding, unspecified esophageal varices type ([facility] [address] [I85.00])    Surgeons: Nloan Rivera MD Responsible Provider: Susan Chawla MD    Anesthesia Type: TIVA ASA Status: 4          Anesthesia Type: TIVA    Mindy Phase I: Mindy Score: 10    Mindy Phase II: Mindy Score: 7      Anesthesia Post Evaluation    Patient location during evaluation: PACU  Patient participation: complete - patient participated  Level of consciousness: awake  Airway patency: patent  Nausea & Vomiting: no nausea and no vomiting  Complications: no  Cardiovascular status: blood pressure returned to baseline  Respiratory status: acceptable  Hydration status: euvolemic

## 2023-04-26 NOTE — CARE COORDINATION
RNCM: Patient admitted 4/23 for painless jaundice. Patient to [facility] for EGD today. Patient accepted to [facility] when stable. CM following.

## 2023-04-26 NOTE — PLAN OF CARE
Problem: Discharge Planning  Goal: Discharge to home or other facility with appropriate resources  Outcome: Progressing  Flowsheets (Taken 4/25/2023 1950 by Verona Moran RN)  Discharge to home or other facility with appropriate resources:   Identify discharge learning needs (meds, wound care, etc)   Refer to discharge planning if patient needs post-hospital services based on physician order or complex needs related to functional status, cognitive ability or social support system     Problem: Safety - Adult  Goal: Free from fall injury  Outcome: Progressing  Flowsheets (Taken 4/25/2023 1950 by Verona Moran RN)  Free From Fall Injury: Instruct family/caregiver on patient safety     Problem: Skin/Tissue Integrity  Goal: Absence of new skin breakdown  Description: 1. Monitor for areas of redness and/or skin breakdown  2. Assess vascular access sites hourly  3. Every 4-6 hours minimum:  Change oxygen saturation probe site  4. Every 4-6 hours:  If on nasal continuous positive airway pressure, respiratory therapy assess nares and determine need for appliance change or resting period.   Outcome: Progressing     Problem: Risk for Elopement  Goal: Patient will not exit the unit/facility without proper excort  Outcome: Progressing     Problem: Chronic Conditions and Co-morbidities  Goal: Patient's chronic conditions and co-morbidity symptoms are monitored and maintained or improved  Outcome: Progressing  Flowsheets (Taken 4/25/2023 1950 by Verona Moran RN)  Care Plan - Patient's Chronic Conditions and Co-Morbidity Symptoms are Monitored and Maintained or Improved: Monitor and assess patient's chronic conditions and comorbid symptoms for stability, deterioration, or improvement

## 2023-04-27 VITALS
HEART RATE: 82 BPM | OXYGEN SATURATION: 93 % | WEIGHT: 111 LBS | SYSTOLIC BLOOD PRESSURE: 122 MMHG | TEMPERATURE: 97.9 F | HEIGHT: 67 IN | BODY MASS INDEX: 17.42 KG/M2 | RESPIRATION RATE: 19 BRPM | DIASTOLIC BLOOD PRESSURE: 74 MMHG

## 2023-04-27 LAB
GLUCOSE BLD STRIP.AUTO-MCNC: 246 MG/DL (ref 65–100)
GLUCOSE BLD STRIP.AUTO-MCNC: 264 MG/DL (ref 65–100)
SARS-COV-2 RDRP RESP QL NAA+PROBE: NOT DETECTED
SERVICE CMNT-IMP: ABNORMAL
SERVICE CMNT-IMP: ABNORMAL
SMA IGG SER-ACNC: 12 UNITS (ref 0–19)
SOURCE: NORMAL

## 2023-04-27 PROCEDURE — 6370000000 HC RX 637 (ALT 250 FOR IP): Performed by: INTERNAL MEDICINE

## 2023-04-27 PROCEDURE — 2580000003 HC RX 258: Performed by: FAMILY MEDICINE

## 2023-04-27 PROCEDURE — 6370000000 HC RX 637 (ALT 250 FOR IP): Performed by: FAMILY MEDICINE

## 2023-04-27 PROCEDURE — 6370000000 HC RX 637 (ALT 250 FOR IP): Performed by: HOSPITALIST

## 2023-04-27 PROCEDURE — 87635 SARS-COV-2 COVID-19 AMP PRB: CPT

## 2023-04-27 PROCEDURE — 97530 THERAPEUTIC ACTIVITIES: CPT

## 2023-04-27 PROCEDURE — 82962 GLUCOSE BLOOD TEST: CPT

## 2023-04-27 RX ORDER — INSULIN GLARGINE 100 [IU]/ML
12 INJECTION, SOLUTION SUBCUTANEOUS DAILY
Qty: 10 ML | Refills: 0
Start: 2023-04-28

## 2023-04-27 RX ORDER — LEVOTHYROXINE SODIUM 0.1 MG/1
100 TABLET ORAL DAILY
Qty: 30 TABLET | Refills: 0
Start: 2023-04-27

## 2023-04-27 RX ORDER — PANTOPRAZOLE SODIUM 40 MG/1
40 TABLET, DELAYED RELEASE ORAL
Qty: 60 TABLET | Refills: 0
Start: 2023-04-27

## 2023-04-27 RX ORDER — INSULIN LISPRO 100 [IU]/ML
0-8 INJECTION, SOLUTION INTRAVENOUS; SUBCUTANEOUS
Qty: 3 EACH | Refills: 0
Start: 2023-04-27

## 2023-04-27 RX ORDER — NADOLOL 40 MG/1
40 TABLET ORAL DAILY
Qty: 30 TABLET | Refills: 0
Start: 2023-04-27

## 2023-04-27 RX ORDER — PANTOPRAZOLE SODIUM 40 MG/1
40 TABLET, DELAYED RELEASE ORAL
Status: DISCONTINUED | OUTPATIENT
Start: 2023-04-27 | End: 2023-04-27 | Stop reason: HOSPADM

## 2023-04-27 RX ORDER — NADOLOL 40 MG/1
40 TABLET ORAL DAILY
Status: DISCONTINUED | OUTPATIENT
Start: 2023-04-27 | End: 2023-04-27 | Stop reason: HOSPADM

## 2023-04-27 RX ADMIN — INSULIN LISPRO 2 UNITS: 100 INJECTION, SOLUTION INTRAVENOUS; SUBCUTANEOUS at 12:39

## 2023-04-27 RX ADMIN — NADOLOL 40 MG: 40 TABLET ORAL at 12:35

## 2023-04-27 RX ADMIN — SODIUM CHLORIDE, PRESERVATIVE FREE 10 ML: 5 INJECTION INTRAVENOUS at 09:10

## 2023-04-27 RX ADMIN — NYSTATIN 500000 UNITS: 100000 SUSPENSION ORAL at 12:35

## 2023-04-27 RX ADMIN — PANTOPRAZOLE SODIUM 40 MG: 40 TABLET, DELAYED RELEASE ORAL at 12:35

## 2023-04-27 RX ADMIN — NYSTATIN 500000 UNITS: 100000 SUSPENSION ORAL at 09:10

## 2023-04-27 RX ADMIN — INSULIN GLARGINE 12 UNITS: 100 INJECTION, SOLUTION SUBCUTANEOUS at 09:07

## 2023-04-27 RX ADMIN — INSULIN LISPRO 3 UNITS: 100 INJECTION, SOLUTION INTRAVENOUS; SUBCUTANEOUS at 12:38

## 2023-04-27 RX ADMIN — INSULIN LISPRO 3 UNITS: 100 INJECTION, SOLUTION INTRAVENOUS; SUBCUTANEOUS at 09:08

## 2023-04-27 RX ADMIN — INSULIN LISPRO 2 UNITS: 100 INJECTION, SOLUTION INTRAVENOUS; SUBCUTANEOUS at 09:09

## 2023-04-27 NOTE — DISCHARGE SUMMARY
Report called to nurse receiving the patient at [facility]. [facility] transport in to get patient.
Skilled Nursing  Contact information:  [address] [place] [address]  [phone]                           Time spent in patient discharge and coordination 33 minutes. Follow up labs/diagnostics (ultimately defer to outpatient provider):  As above    Plan was discussed with patient. All questions answered. Patient was stable at time of discharge. Instructions given to call a physician or return if any concerns.     Current Discharge Medication List        START taking these medications    Details   insulin glargine (LANTUS) 100 UNIT/ML injection vial Inject 12 Units into the skin daily  Qty: 10 mL, Refills: 0      insulin lispro (HUMALOG) 100 UNIT/ML SOLN injection vial Inject 0-8 Units into the skin 3 times daily (with meals)  Qty: 3 each, Refills: 0      nystatin (MYCOSTATIN) 055227 UNIT/ML suspension Take 5 mLs by mouth 4 times daily  Qty: 1 each, Refills: 0      nadolol (CORGARD) 40 MG tablet Take 1 tablet by mouth daily  Qty: 30 tablet, Refills: 0      pantoprazole (PROTONIX) 40 MG tablet Take 1 tablet by mouth 2 times daily (before meals)  Qty: 60 tablet, Refills: 0           CONTINUE these medications which have CHANGED    Details   levothyroxine (SYNTHROID) 100 MCG tablet Take 1 tablet by mouth Daily TAKE ONE TABLET BY MOUTH ONCE DAILY BEFORE BREAKFAST FOR HYPOTHYROIDISM  Qty: 30 tablet, Refills: 0           STOP taking these medications       alendronate (FOSAMAX) 70 MG tablet Comments:   Reason for Stopping:         ARIPiprazole (ABILIFY) 15 MG tablet Comments:   Reason for Stopping:         aspirin 81 MG EC tablet Comments:   Reason for Stopping:         atorvastatin (LIPITOR) 40 MG tablet Comments:   Reason for Stopping:         vitamin D3 (CHOLECALCIFEROL) 125 MCG (5000 UT) TABS tablet Comments:   Reason for Stopping:         clopidogrel (PLAVIX) 75 MG tablet Comments:   Reason for Stopping:         escitalopram (LEXAPRO) 20 MG tablet Comments:   Reason for Stopping:

## 2023-04-27 NOTE — DISCHARGE INSTRUCTIONS
DISCHARGE SUMMARY from Nurse    PATIENT INSTRUCTIONS:    After general anesthesia or intravenous sedation, for 24 hours or while taking prescription Narcotics:  Limit your activities  Do not drive and operate hazardous machinery  Do not make important personal or business decisions  Do  not drink alcoholic beverages  If you have not urinated within 8 hours after discharge, please contact your surgeon on call. Report the following to your surgeon:  Excessive pain, swelling, redness or odor of or around the surgical area  Temperature over 100.5  Nausea and vomiting lasting longer than 4 hours or if unable to take medications  Any signs of decreased circulation or nerve impairment to extremity: change in color, persistent  numbness, tingling, coldness or increase pain  Any questions    What to do at Home:  Recommended activity: activity as tolerated and no driving for today or if taking narcotic pain medication. If you experience any of the following symptoms fever of 101 or greater, pain unrelieved by medication, uncontrollable nausea or vomiting, vomiting blood, please follow up with PCP. *  Please give a list of your current medications to your Primary Care Provider. *  Please update this list whenever your medications are discontinued, doses are      changed, or new medications (including over-the-counter products) are added. *  Please carry medication information at all times in case of emergency situations. These are general instructions for a healthy lifestyle:    No smoking/ No tobacco products/ Avoid exposure to second hand smoke  Surgeon General's Warning:  Quitting smoking now greatly reduces serious risk to your health.     Obesity, smoking, and sedentary lifestyle greatly increases your risk for illness    A healthy diet, regular physical exercise & weight monitoring are important for maintaining a healthy lifestyle    You may be retaining fluid if you have a history of heart failure or

## 2023-04-27 NOTE — CARE COORDINATION
Patient with discharge orders for today. Patient discharging to [facility]. [facility] to provide transport. Pick-up time 1400. Patient and family in agreement with discharge plan. Patient has met all treatment goals and milestones for discharge. CM following until patient is discharged. 04/27/23 1441   Service Assessment   Patient Orientation Alert and [address] [place] Discharge   Transition of Care Consult (CM Consult) Discharge Planning   Services [facility] ([facility]); Transport   [employer] Information Provided? No   Mode of Transport at Discharge BLS   Confirm Follow Up Transport Family   Condition of Participation: Discharge Planning   The Plan for Transition of Care is related to the following treatment goals: Return to baseline   The Patient and/or Patient Representative was provided with a Choice of Provider? Patient   The Patient and/Or Patient Representative agree with the Discharge Plan? Yes   Freedom of Choice list was provided with basic dialogue that supports the patient's individualized plan of care/goals, treatment preferences, and shares the quality data associated with the providers?   Yes

## 2023-04-27 NOTE — PROGRESS NOTES
.END OF SHIFT NOTE:    INTAKE/OUTPUT  04/25 0701 - 04/26 0700  In: 240 [P.O.:240]  Out: 1475 [Urine:1475]  Voiding: Yes  Catheter: No  Drain:   External Urinary Catheter (Active)   Site Assessment Clean,dry & intact 04/26/23 1927   Placement Repositioned 04/25/23 1843   Catheter Care Catheter/Wick replaced;Suction Canister/Tubing changed 04/25/23 1450   Perineal Care Yes 04/25/23 1950   Suction 40 mmgHg continuous 04/26/23 1927   Urine Color Karey 04/26/23 1927   Urine Appearance Clear 04/26/23 1927   Output (mL) 325 mL 04/26/23 0422               Flatus: Patient does have flatus present. Stool:  occurrences. Characteristics:  Stool Appearance: Unable to assess  Stool Color: Unable to assess  Stool Amount: Unable to assess  Stool Assessment  Stool Appearance: Unable to assess  Stool Color: Unable to assess  Stool Amount: Unable to assess  Last BM (including prior to admit): 04/25/23    Emesis:  occurrences. Characteristics:        VITAL SIGNS  Patient Vitals for the past 12 hrs:   Temp Pulse Resp BP SpO2   04/26/23 1615 97.7 °F (36.5 °C) 72 15 122/63 95 %   04/26/23 1240 -- 76 16 125/66 98 %   04/26/23 1230 -- 88 16 116/64 95 %   04/26/23 1220 -- 86 16 115/61 97 %   04/26/23 1213 96.8 °F (36 °C) 89 16 (!) 98/57 97 %   04/26/23 1155 -- 92 20 121/65 96 %   04/26/23 1016 98.1 °F (36.7 °C) 86 18 113/62 93 %       Pain Assessment  Pain Level: 0 (04/26/23 1927)          Ambulating  No    Shift report given to oncoming nurse at the bedside.     Moss Pallas, RN
ACUTE OCCUPATIONAL THERAPY GOALS:   (Developed with and agreed upon by patient and/or caregiver.)  1. Patient will complete lower body bathing and dressing with SBA and adaptive equipment as   needed. 2. Patient will completed upper body bathing and dressing with Mod I and adaptive equipment as needed. 3. Patient will complete grooming standing at sink with Mod I and adaptive equipment as needed. 4. Patient will complete toileting with SBA and adaptive equipment as needed. 5. Patient will tolerate 30 minutes of OT treatment with no rest breaks to increase activity tolerance for ADLs. 6. Patient will complete functional transfers with SBA and adaptive equipment as needed. Timeframe: 7 visits       OCCUPATIONAL THERAPY Initial Assessment, Daily Note, and AM       OT Visit Days: 1  Acknowledge Orders  Time  OT Charge Capture  Rehab Caseload Tracker      Phu Hernandez is a 68 y.o. female   PRIMARY DIAGNOSIS: Painless jaundice  Jaundice [R17]  Cirrhosis of liver with ascites, unspecified hepatic cirrhosis type ([facility] [address] [K74.60, R18.8]  Closed nondisplaced fracture of right clavicle, unspecified part of clavicle, initial encounter [S42.001A]  Painless jaundice [R17]       Reason for Referral: Generalized Muscle Weakness (M62.81)  Other abnormalities of gait and mobility (R26.89)  Repeated Falls (R29.6)  History of falling (Z91.81)  Inpatient: Payor: Tabitha Erickson / Plan: [employer] / Product Type: *No Product type* /     ASSESSMENT:     REHAB RECOMMENDATIONS:   Recommendation to date pending progress:  Setting:  Short-term Rehab    Equipment:    To Be Determined     ASSESSMENT:  Ms. Rosa Gordillo is a 67 y/o F presenting with painless jaundice. Pt supine on entry on RA with spouse present, A/O x 4. Applicable PMHx: CVA, speech difficulty, DM II, constipation, HTN, depression. Pt deneid light headedness, dizziness or SOB. Pt spouse assisted with PLOF and hx.  Pt spouse reports 5 fall(s) in past 6
ACUTE PHYSICAL THERAPY GOALS:   (Developed with and agreed upon by patient and/or caregiver. )  LTG:  (1.)Ms. Maliha Norris will move from supine to sit and sit to supine, scoot up and down and roll side to side in bed with INDEPENDENT within 7 day(s). (2.)Ms. Maliha Norris will transfer from bed to chair and chair to bed with SBA using the least restrictive device within 7 day(s). (3.)Ms. Sparks will ambulate with SBA for 150+ feet with the least restrictive device within 7 day(s). (4.)Ms. Maliha Norris will perform standing static and dynamic balance activities x 8 minutes with CGA to improve safety within 7 day(s). PHYSICAL THERAPY Initial Assessment and Daily Note  (Link to Caseload Tracking: PT Visit Days : 1  Acknowledge Orders  Time In/Out  PT Charge Capture  Rehab Caseload Tracker    Sabrina Castanon is a 68 y.o. female   PRIMARY DIAGNOSIS: Painless jaundice  Jaundice [R17]  Cirrhosis of liver with ascites, unspecified hepatic cirrhosis type ([facility] [address] [K74.60, R18.8]  Closed nondisplaced fracture of right clavicle, unspecified part of clavicle, initial encounter [S42.001A]  Painless jaundice [R17]       Reason for Referral: Other abnormalities of gait and mobility (R26.89)  History of falling (Z91.81)  Inpatient: Payor: Axel Salgado / [facility] / Product Type: *No Product type* /     ASSESSMENT:     REHAB RECOMMENDATIONS:   Recommendation to date pending progress:  Setting:  Short-term Rehab    Equipment:    Rolling Walker  To Be Determined     ASSESSMENT:  Ms. Maliha Norris lives with her . She is typically independent in home but admits to falling recently. Patient today with flat affect and speaks very little. Room is dark and patient appears depressed. She is thin and appears jaundiced. She transitioned to sit with CGA. Stood with min A and ambulated 25' with RW and min A to navigate through tight spaces. . Patient then sat in recliner in performed LE ex's.   Patient encouraged patient to
ACUTE PHYSICAL THERAPY GOALS:   (Developed with and agreed upon by patient and/or caregiver. )  LTG:  (1.)Ms. Veena Alegria will move from supine to sit and sit to supine, scoot up and down and roll side to side in bed with INDEPENDENT within 7 day(s). (2.)Ms. Veena Alegria will transfer from bed to chair and chair to bed with SBA using the least restrictive device within 7 day(s). (3.)Ms. Sparks will ambulate with SBA for 150+ feet with the least restrictive device within 7 day(s). (4.)Ms. Veena Alegria will perform standing static and dynamic balance activities x 8 minutes with CGA to improve safety within 7 day(s). PHYSICAL THERAPY: Daily Note AM   (Link to Caseload Tracking: PT Visit Days : 2  Time In/Out PT Charge Capture  Rehab Caseload Tracker  Orders    Marilyn Dorado is a 68 y.o. female   PRIMARY DIAGNOSIS: Painless jaundice  Jaundice [R17]  Cirrhosis of liver with ascites, unspecified hepatic cirrhosis type ([facility] [address] [K74.60, R18.8]  Closed nondisplaced fracture of right clavicle, unspecified part of clavicle, initial encounter [S42.001A]  Painless jaundice [R17]  Procedure(s) (LRB):  EGD ESOPHAGOGASTRODUODENOSCOPY LIGATION (BANDING) *room 205 (N/A)  1 Day Post-Op  Inpatient: Payor: Stewart Moore / Plan: [employer] / Product Type: *No Product type* /     ASSESSMENT:     REHAB RECOMMENDATIONS:   Recommendation to date pending progress:  Setting:  Short-term Rehab    Equipment:    To Be Determined     ASSESSMENT:  Ms. Veena Alegria was supine in bed with  at bedside. Very flat affect. She required CGA for all mobility today. She increased gait distance to 76' with RW with slow, short steps. She required cueing for safe technique with transfers. Patient progressing slowly towards goals. .     SUBJECTIVE:   Ms. Veena Alegria states, \"No.\"     Social/Functional Lives With: Spouse  Home Layout: One level  Home Access: Level entry  Active : No  OBJECTIVE:     PAIN: [place]a [place] / O2: Amaury Rumple / [place] / Kayla Jalloh:
At this time, MRI does not have a camera to do abdominal imaging. We should be getting a loaner camera soon. Not sure of the time line on that right now. [facility] does have the camera if the MRI is needed sooner rather than later they could be transferred. [facility] doc has been made aware of the delay via [employer].
Attempted to see patient this AM for occupational therapy treatment  session. Patient off floor for procedure. Will follow and re-attempt as schedule permits/patient available.  Thank you,    Shalom Julio, OT    Rehab Caseload Tracker
Both pt and  adamantly state that  she \"hasn't taken any medicines in a year and a half. \" \" She'll only take a drink of water. \"    states  \" I have boxes of medicines at home a year and half old. \" When I asked pt why she didn't take any medicines she said, \" I don't want to. \"   states she was in a facility (not sure of name but thinks it was a psychiatric facility) 18 months ago and that was the last time she saw a MD.
Comprehensive Nutrition Assessment    Type and Reason for Visit: Initial, Positive Nutrition Screen  Malnutrition Screening Tool: Malnutrition Screen  Have you recently lost weight without trying?: 24 to 33 pounds (3 points)  Have you been eating poorly because of a decreased appetite?: Yes (1 point)  Malnutrition Screening Tool Score: 4    Nutrition Recommendations/Plan:   Meals and Snacks:  Diet: Continue current order  Nutrition Supplement Therapy:  Medical food supplement therapy:  Initiate Glucerna Shake three times per day (this provides 220 kcal and 10 grams protein per bottle)       Malnutrition Assessment:  Malnutrition Status: Moderate malnutrition  Context: Chronic Illness  Findings of clinical characteristics of malnutrition:   Energy Intake:     Weight Loss:  Unable to assess (potentially 16% in over 1 year. Current weight stated and last recorded wt from Jan 2022.)     Body Fat Loss:  Mild body fat loss Buccal region   Muscle Mass Loss:  Severe muscle mass loss Temples (temporalis), Clavicles (pectoralis & deltoids)  Fluid Accumulation:  Unable to assess     Strength:  Not Performed  extreme jaundice  Nutrition Assessment:  Nutrition History: Patient and  gave report.  reports this issue started in Nov of 2021. Patient adds that she started to lose her appetite and just had slow loss of wt and appetite over time. She reports current appetite 2 out of 10 (with 10 being great). She denies any N/V over the last 18 months.  states that daily intake looks like a Cutie orange, 10 grapes and a yogurt and then supper which he cooks for them. Patient denies using any protein shakes in past.   Patient unsure of past weight per EMR was 61.7 in Jan 2022, no wt history since then. Current weight stated. Do You Have Any Cultural, Scientologist, or Ethnic Food Preferences?: No   Nutrition Background:       PMH: DM2, HTN, PVD.  Patient presented with weakness, poor po, wt loss and severe
END OF SHIFT NOTE:    INTAKE/OUTPUT  04/23 0701 - 04/24 0700  In: 1934.9 [I.V.:934.9]  Out: 400 [Urine:400]  Voiding: Yes  Catheter: Yes - External  Drain:   External Urinary Catheter (Active)   Site Assessment Clean,dry & intact 04/23/23 2334   Placement Initiated 04/23/23 1926   Perineal Care Yes 04/23/23 1926   Suction 40 mmgHg continuous 04/23/23 2334   Urine Color Brown 04/23/23 2334   Urine Appearance Clear 04/23/23 2334   Output (mL) 100 mL 04/24/23 0318               Flatus: Patient does not have flatus present. Stool:  0 occurrences. Last BM (including prior to admit): 04/21/2023       Emesis: 0 occurrences. VITAL SIGNS  Patient Vitals for the past 12 hrs:   Temp Pulse Resp BP SpO2   04/24/23 0318 97.7 °F (36.5 °C) 67 16 (!) 117/56 96 %   04/23/23 2334 99 °F (37.2 °C) 70 17 (!) 111/55 95 %   04/23/23 1946 98.2 °F (36.8 °C) 80 17 122/61 95 %   04/23/23 1926 -- 85 -- (!) 113/56 --   04/23/23 1817 -- 85 17 (!) 113/56 --       Ambulating  No    Shift report given to oncoming nurse at the bedside.     Vikram Harvey RN
END OF SHIFT NOTE:    INTAKE/OUTPUT  04/24 0701 - 04/25 0700  In: -   Out: 1425 [NCQIM:4290]  Voiding: Yes  Catheter: No  Drain:   External Urinary Catheter (Active)   Site Assessment Clean,dry & intact 04/24/23 1931   Placement Replaced 04/24/23 1612   Perineal Care Yes 04/24/23 1612   Suction 40 mmgHg continuous 04/24/23 1931   Urine Color Karey 04/25/23 0420   Urine Appearance Cloudy 04/24/23 1931   Output (mL) 250 mL 04/25/23 0420               Flatus: Patient does have flatus present. Stool:  occurrences. Characteristics:  Stool Appearance: Unable to assess  Stool Color: Unable to assess  Stool Amount: Unable to assess  Stool Assessment  Stool Appearance: Unable to assess  Stool Color: Unable to assess  Stool Amount: Unable to assess  Last BM (including prior to admit): 04/21/23 (Per patients )    Emesis:  occurrences. Characteristics:        VITAL SIGNS  Patient Vitals for the past 12 hrs:   Temp Pulse Resp BP SpO2   04/25/23 0420 98.3 °F (36.8 °C) 86 18 138/69 94 %   04/24/23 2343 98.8 °F (37.1 °C) 96 18 109/67 94 %   04/24/23 1935 98.8 °F (37.1 °C) 89 -- 129/69 95 %       Pain Assessment  Pain Level: 0 (04/24/23 1931)          Ambulating  Yes    Shift report given to oncoming nurse at the bedside.     Elia Nicole RN
END OF SHIFT NOTE:    INTAKE/OUTPUT  04/26 0701 - 04/27 0700  In: 344.4 [I.V.:344.4]  Out: 5438 [Urine:1185]  Voiding: Yes  Catheter: No  Drain:   External Urinary Catheter (Active)   Site Assessment Clean,dry & intact 04/27/23 0343   Placement Repositioned 04/25/23 1843   Catheter Care Catheter/Wick replaced;Suction Canister/Tubing changed 04/25/23 1450   Perineal Care Yes 04/25/23 1950   Suction 40 mmgHg continuous 04/27/23 0343   Urine Color Other (Comment) 04/27/23 0343   Urine Appearance Clear 04/27/23 0343   Output (mL) 760 mL 04/27/23 0343               Flatus: Patient does have flatus present. Stool:  occurrences. Characteristics:  Stool Appearance: Unable to assess  Stool Color: Unable to assess  Stool Amount: Unable to assess  Stool Assessment  Stool Appearance: Unable to assess  Stool Color: Unable to assess  Stool Amount: Unable to assess  Last BM (including prior to admit): 04/25/23    Emesis:  occurrences. Characteristics:        VITAL SIGNS  Patient Vitals for the past 12 hrs:   Temp Pulse Resp BP SpO2   04/27/23 0343 98.2 °F (36.8 °C) 96 17 (!) 141/80 97 %   04/26/23 2307 98.6 °F (37 °C) 79 16 125/68 97 %   04/26/23 1959 98.6 °F (37 °C) 71 17 129/65 96 %       Pain Assessment  Pain Level: 0 (04/26/23 1927)          Ambulating  Yes    Shift report given to oncoming nurse at the bedside.     Trudy Hurd RN
Hospitalist   Admit Date:  2023  2:19 PM   Name:  Eleanor Kingsley   Age:  68 y.o. Sex:  female  :  1950   MRN:  [id number]   Room:        History of Present Illness:   Eleanor Kingsley is a 68 y.o. female who presented to the ED for cc weakness, poor oral intake, weight loss, and jaundice. Wife is not speaking much to me so most of the hx is obtained by the .  states he noticed worsening jaundice over the past two days. Nothing seems to make better or worse. Has not been taking any medications for the past year. Denies homicidal or suicidal ideation. Hx of CVA, difficulty with speech, PVD, DM type II, constipation, HTN, depression. Ammonia <11, glucose 264, LFTS elevated and total bili 12.8. TSH 15.9  Right shoulder x ray -   Minimal cortical irregularity of the clavicle lateral shaft. Correlate with   focal pain for possible nondisplaced fracture. CT abdomen pelvis with contrast -   Cirrhosis, portal hypertension, splenomegaly. 2. Trace ascites. 3. Gallstones. 4. Constipation and diverticulosis. 5. Gas within urinary bladder may be iatrogenic if recently catheterized,   otherwise concerning for infection. 6. Tiny nonobstructing bilateral renal calculi. Today, pt is icteric, in depressed mood/ not talking much,  at bedside    Assessment & Plan:     1- Uncontrolled hypothyroidism, due to non-compliance, stopped all meds 1.5y ago. TSH elevated. With weakness and depression, mood changes. 2- Liver cirrhosis, new diagnosis, icteric  3- Hx of DM, HTN, stroke. Stable.   4- Non-compliance, counseled     Rx:  Started back on Synthroid  Follow up work up ordered for Liver dz and Gi consult  Insulin scale  BP control    Dipso; TBD, PT/OT consulted     Objective:   Patient Vitals for the past 24 hrs:   Temp Pulse Resp BP SpO2   23 0744 97.9 °F (36.6 °C) 67 16 119/63 97 %   23 0318 97.7 °F (36.5 °C) 67 16 (!) 117/56 96 %   23 2334 99 °F
Hospitalist   Admit Date:  2023  2:19 PM   Name:  Raj Ellis   Age:  68 y.o. Sex:  female  :  1950   MRN:  [id number]   Room:        History of Present Illness:   Raj Ellis is a 68 y.o. female who presented to the ED for cc weakness, poor oral intake, weight loss, and jaundice. Wife is not speaking much to me so most of the hx is obtained by the .  states he noticed worsening jaundice over the past two days. Nothing seems to make better or worse. Has not been taking any medications for the past year. Denies homicidal or suicidal ideation. Hx of CVA, difficulty with speech, PVD, DM type II, constipation, HTN, depression. Ammonia <11, glucose 264, LFTS elevated and total bili 12.8. TSH 15.9  Right shoulder x ray -   Minimal cortical irregularity of the clavicle lateral shaft. Correlate with   focal pain for possible nondisplaced fracture. CT abdomen pelvis with contrast -   Cirrhosis, portal hypertension, splenomegaly. 2. Trace ascites. 3. Gallstones. 4. Constipation and diverticulosis. 5. Gas within urinary bladder may be iatrogenic if recently catheterized,   otherwise concerning for infection. 6. Tiny nonobstructing bilateral renal calculi. Today, icteric, in depressed mood/ not talking much,  at bedside, no ac events    Assessment & Plan:     1- Uncontrolled hypothyroidism, due to non-compliance, stopped all meds 1.5y ago. TSH elevated. With weakness and depression, mood changes. 2- Liver cirrhosis, w/ conjugated hyperbili, new diagnosis, icteric, suspected fatty changes. Viral Hepatitis and ceruplasmin negative  3- Hx of DM, HTN, stroke. Stable.   4- Non-compliance, counseled     Rx:  Started back on Synthroid  Follow abdominal US, rest of work up ordered for Liver dz and Gi consult  EGD planned tomorrow to screen for eso. varices  Insulin scale  BP control    Dipso; rehab, tomorrow after EGD  I spoke to pt, , Gi, CM
Myla Albarran TRANSFER - IN REPORT:    Verbal report received from RN Cecile Knight on Renell Dad  being received from GI Lab for routine progression of patient care      Report consisted of patient's Situation, Background, Assessment and Recommendations(SBAR). Information from the following report(s) Surgery Report was reviewed with the receiving nurse. EGD x4 bands applied to esophageal varices. Opportunity for questions and clarification was provided. Assessment completed upon patient's arrival to unit and care assumed.
PT was in bed with Family at bedside. [address] introduced self. [address] checked for unmet needs and offered support      Rev. Chani Laurent M.Div.   
Physical Therapy Note:    Attempted to see patient this AM for physical therapy treatment  session. Patient off the floor for EGD. . Will follow and re-attempt as schedule permits/patient available.  Thank you,    A Ade Valles, PT     Rehab Caseload Tracker
Physician Progress Note      Kavita Cavanaugh  CSN #:                  [id number]  :                       1950  ADMIT DATE:       2023 2:19 PM  [address] DATE:  RESPONDING  PROVIDER #:        Oumou Carrasco MD          QUERY TEXT:    Patient admitted with jaundice, noted to have decreased platelets. If   possible, please document in progress notes and discharge summary if you are   evaluating and/or treating any of the following: The medical record reflects the following:  Risk Factors: 73 YOF, Moderate protein malnutrition,  Clinical Indicators:  PLTS 113,  PLTs 116  Treatment: Monitoring,    Thank you,  Phi Rich RN,C BSN  Clinical   [email or web address]  Options provided:  -- Thrombocytopenia  -- Thrombocytopenia not clinically significant  -- Other - I will add my own diagnosis  -- Disagree - Not applicable / Not valid  -- Disagree - Clinically unable to determine / Unknown  -- Refer to Clinical Documentation Reviewer    PROVIDER RESPONSE TEXT:    This patient has Thrombocytopenia. Query created by:  Rajan Melara on 2023 10:28 AM      Electronically signed by:  Oumou Carrasco MD 2023 2:21 PM
Progress Note  Date:2023       Room:[facility]  Patient Paloma Vann     YOB: 1950     Age:73 y.o. Subjective    Subjective   No overnight events. Review of Systems  Objective         Vitals Last 24 Hours:  TEMPERATURE:  Temp  Av.6 °F (37 °C)  Min: 98.2 °F (36.8 °C)  Max: 98.8 °F (37.1 °C)  RESPIRATIONS RANGE: Resp  Av.8  Min: 14  Max: 18  PULSE OXIMETRY RANGE: SpO2  Av.7 %  Min: 94 %  Max: 97 %  PULSE RANGE: Pulse  Av.3  Min: 86  Max: 97  BLOOD PRESSURE RANGE: Systolic (47EPX), FMS:725 , Min:108 , LCQ:357   ; Diastolic (00ICO), XNJ:92, Min:58, Max:69    I/O (24Hr): Intake/Output Summary (Last 24 hours) at 2023 1832  Last data filed at 2023 1450  Gross per 24 hour   Intake 240 ml   Output 1175 ml   Net -935 ml     Objective  Labs/Imaging/Diagnostics    Labs:  CBC:  Recent Labs     23  14323  0635   WBC 3.6* 3.4*   RBC 4.60 4.21   HGB 12.0 11.0*   HCT 37.6 34.5*   MCV 81.7* 81.9*   RDW 18.1* 18.7*   * 116*     CHEMISTRIES:  Recent Labs     23  1437 23  0635 23  0619   * 136 133   K 4.3 3.2* 3.9    107 104   CO2    BUN 11 8 8   CREATININE 0.40* 0.40* 0.40*   GLUCOSE 264* 195* 310*   MG  --  1.6*  --      PT/INR:  Recent Labs     23  19423  0619   PROTIME 13.9 13.9   INR 1.0 1.0     APTT:No results for input(s): APTT in the last 72 hours. LIVER PROFILE:  Recent Labs     23  1437 23  0635 23  0920 23  0619   AST 79* 44*  --  47*   ALT 74* 54  --  57   BILIDIR  --   --  7.9* 5.7*   BILITOT 12.8* 9.8*  --  7.8*   ALKPHOS 247* 213*  --  254*       Imaging Last 24 Hours:  No results found.   Assessment//Plan           Hospital Problems             Last Modified POA    * (Principal) Painless jaundice 2023 Yes    Other fatigue 2023 Yes    Peripheral vascular disease ([address] 2023 Yes    Essential hypertension 2023 Yes    Peripheral neuropathy
Spiritual Care Visit. Initial visit. Patient was visited by [facility]. Entered by Ankit Lucero, Staff Chaplain. Chacko, Niko
TRANSFER - IN REPORT:    Verbal report received from [facility] on Fara Rebollar  being received from  205 for ordered procedure      Report consisted of patient's Situation, Background, Assessment and   Recommendations(SBAR). Information from the following report(s) Procedure Verification was reviewed with the receiving nurse. Opportunity for questions and clarification was provided. Assessment completed upon patient's arrival to unit and care assumed.
Tonya Diaz TRANSFER - OUT REPORT:    Verbal report given to CIARA Ornelas on Lazarus Hollins  being transferred to GI Lab for ordered procedure       Report consisted of patient's Situation, Background, Assessment and   Recommendations(SBAR). Information from the following report(s) MAR, Recent Results, and Neuro Assessment was reviewed with the receiving nurse. [place] Assessment: No data recorded  Lines:   Peripheral IV 04/23/23 Left;Dorsal Forearm (Active)   Site Assessment Clean, dry & intact 04/25/23 1950   Line Status Flushed;Capped 04/25/23 1950   Line Care Cap changed 04/25/23 1950   Phlebitis Assessment No symptoms 04/25/23 1950   Infiltration Assessment 0 04/25/23 1950   Alcohol Cap Used Yes 04/25/23 1950   Dressing Status Clean, dry & intact 04/25/23 1950   Dressing Type Transparent 04/25/23 1950        Opportunity for questions and clarification was provided.       Patient transported with:  [place]
Units  3 Units SubCUTAneous TID WC    magnesium oxide (MAG-OX) tablet 400 mg  400 mg Oral BID    polyethylene glycol (GLYCOLAX) packet 17 g  17 g Oral Daily PRN    sodium chloride flush 0.9 % injection 5-40 mL  5-40 mL IntraVENous 2 times per day    sodium chloride flush 0.9 % injection 5-40 mL  5-40 mL IntraVENous PRN    0.9 % sodium chloride infusion   IntraVENous PRN    levothyroxine (SYNTHROID) tablet 100 mcg  100 mcg Oral Daily    insulin lispro (HUMALOG) injection vial 0-8 Units  0-8 Units SubCUTAneous TID WC    insulin lispro (HUMALOG) injection vial 0-4 Units  0-4 Units SubCUTAneous Nightly    glucose chewable tablet 16 g  4 tablet Oral PRN    dextrose bolus 10% 125 mL  125 mL IntraVENous PRN    Or    dextrose bolus 10% 250 mL  250 mL IntraVENous PRN    glucagon (rDNA) injection 1 mg  1 mg SubCUTAneous PRN    dextrose 10 % infusion   IntraVENous Continuous PRN    nystatin (MYCOSTATIN) [id number] UNIT/ML suspension 500,000 Units  5 mL Oral 4x Daily       Signed:  Mary Grace Zapata MD    Part of this note may have been written by using a voice dictation software. The note has been proof read but may still contain some grammatical/other typographical errors.

## 2023-04-27 NOTE — PLAN OF CARE
Problem: Discharge Planning  Goal: Discharge to home or other facility with appropriate resources  Outcome: Progressing     Problem: Safety - Adult  Goal: Free from fall injury  Outcome: Progressing     Problem: Skin/Tissue Integrity  Goal: Absence of new skin breakdown  Description: 1. Monitor for areas of redness and/or skin breakdown  2. Assess vascular access sites hourly  3. Every 4-6 hours minimum:  Change oxygen saturation probe site  4. Every 4-6 hours:  If on nasal continuous positive airway pressure, respiratory therapy assess nares and determine need for appliance change or resting period.   Outcome: Progressing     Problem: Risk for Elopement  Goal: Patient will not exit the unit/facility without proper excort  Outcome: Progressing     Problem: Chronic Conditions and Co-morbidities  Goal: Patient's chronic conditions and co-morbidity symptoms are monitored and maintained or improved  Outcome: Progressing     Problem: Pain  Goal: Verbalizes/displays adequate comfort level or baseline comfort level  Outcome: Progressing

## 2023-04-28 ENCOUNTER — CARE COORDINATION (OUTPATIENT)
Dept: CARE COORDINATION | Facility: CLINIC | Age: 73
End: 2023-04-28

## 2023-04-28 NOTE — CARE COORDINATION
Patient discharged to [facility] on 4.27.23. [facility] outreach postponed for 21 days due to discharge to non-preferred network SNF.

## 2023-05-19 ENCOUNTER — CARE COORDINATION (OUTPATIENT)
Dept: CARE COORDINATION | Facility: CLINIC | Age: 73
End: 2023-05-19

## 2023-05-19 NOTE — CARE COORDINATION
Attempted [facility] outreach after discharge from [address] [place] [address], spoke with patient's . No transition of care outreach indicated due to patient discharge to hospice care.

## 2023-07-10 NOTE — DISCHARGE INSTRUCTIONS
Stroke: After Your Visit     Your Care Instructions     You have had a stroke. Risk factors for stroke include being overweight, smoking, and sedentary lifestyle. This means that the blood flow to a part of your brain was blocked for some time, which damages the nerve cells in that part of the brain. The part of your body controlled by that part of your brain may not function properly now. The brain is an amazing organ that can heal itself to some degree. The stroke you had damaged part of your brain, but other parts of your brain may take over in some way for the damaged areas. You have already started this process. Going home may be hard for you and your family. The more you can try to do for yourself, the better. Remember to take each day one at a time. Follow-up care is a key part of your treatment and safety. Be sure to make and go to all appointments, and call your doctor if you are having problems. Its also a good idea to know your test results and keep a list of the medicines you take. How can you care for yourself at home? Enter a stroke rehabilitation (rehab) program, if your doctor recommends it. Physical, speech, and occupational therapies can help you manage bathing, dressing, eating, and other basics of daily living. Eat a heart-healthy diet that is low in cholesterol, saturated fat, and salt. Eat lots of fresh fruits and vegetables and foods high in fiber. Increase your activities slowly. Take short rest breaks when you get tired. Gradually increase the amount you walk. Start out by walking a little more than you did the day before. Do not drive until your doctor says it is okay. It is normal to feel sad or depressed after a stroke. If the blues last, talk to your doctor. If you are having problems with urine leakage, go to the bathroom at regular times, including when you first wake up and at bedtime. Also, limit fluids after dinner.   If you are constipated, drink plenty of fluids, enough so that your urine is light yellow or clear like water. If you have kidney, heart, or liver disease and have to limit fluids, talk with your doctor before you increase the amount of fluids you drink. Set up a regular time for using the toilet. If you continue to have constipation, your doctor may suggest using a bulking agent, such as Metamucil, or a stool softener, laxative, or enema. Medicines  Take your medicines exactly as prescribed. Call your doctor if you think you are having a problem with your medicine. You may be taking several medicines. ACE (angiotensin-converting enzyme) inhibitors, angiotensin II receptor blockers (ARBs), beta-blockers, diuretics (water pills), and calcium channel blockers control your blood pressure. Statins help lower cholesterol. Your doctor may also prescribe medicines for depression, pain, sleep problems, anxiety, or agitation. If your doctor has given you medicine that prevents blood clots, such as warfarin (Coumadin), aspirin combined with extended-release dipyridamole (Aggrenox), clopidogrel (Plavix), or aspirin to prevent another stroke, you should:  Tell your dentist, pharmacist, and other health professionals that you take these medicines. Watch for unusual bruising or bleeding, such as blood in your urine, red or black stools, or bleeding from your nose or gums. Get regular blood tests to check your clotting time if you are taking Coumadin. Wear medical alert jewelry that says you take blood thinners. You can buy this at most drugstores. Do not take any over-the-counter medicines or herbal products without talking to your doctor first.  If you take birth control pills or hormone replacement therapy, talk to your doctor about whether they are right for you. For family members and caregivers  Make the home safe. Set up a room so that your loved one does not have to climb stairs. Be sure the bathroom is on the same floor.  Move throw rugs and furniture that could cause falls, and make sure that the lighting is good. Put grab bars and seats in tubs and showers. Find out what your loved one can do and what he or she needs help with. Try not to do things for your loved one that your loved one can do on his or her own. Help him or her learn and practice new skills. Visit and talk with your loved one often. Try doing activities together that you both enjoy, such as playing cards or board games. Keep in touch with your loved one's friends as much as you can, and encourage them to visit. Take care of yourself. Do not try to do everything yourself. Ask other family members to help. Eat well, get enough rest, and take time to do things that you enjoy. Keep up with your own doctor visits, and make sure to take your medicines regularly. Get out of the house as much as you can. Join a local support group. Find out if you qualify for home health care visits to help with rehab or for adult day care. When should you call for help? Call 911 anytime you think you may need emergency care. For example, call if:  You have signs of another stroke. These may include:  Sudden numbness, paralysis, or weakness in your face, arm, or leg, especially on only one side of your body. New problems with walking or balance. Sudden vision changes. Drooling or slurred speech. New problems speaking or understanding simple statements, or you feel confused. A sudden, severe headache that is different from past headaches. Call 911 even if these symptoms go away in a few minutes. You cough up blood. You vomit blood or what looks like coffee grounds. You pass maroon or very bloody stools. Call your doctor now or seek immediate medical care if:  You have new bruises or blood spots under your skin. You have a nosebleed. Your gums bleed when you brush your teeth. You have blood in your urine. Your stools are black and tarlike or have streaks of blood.   You have vaginal bleeding when you are not having your period, or heavy period bleeding. You have new symptoms that may be related to your stroke, such as falls or trouble swallowing. Watch closely for changes in your health, and be sure to contact your doctor if you have any problems. Where can you learn more? Go to [employer]    Enter C294  in the search box to learn more about \"Stroke: After Your Visit\". © 4466-2796 [employer], [employer]. Care instructions adapted under license by [employer] (which disclaims liability or warranty for this information). This care instruction is for use with your licensed healthcare professional. If you have questions about a medical condition or this instruction, always ask your healthcare professional. Napolean Jean any warranty or liability for your use of this information. DISCHARGE SUMMARY from Nurse    PATIENT INSTRUCTIONS:    After general anesthesia or intravenous sedation, for 24 hours or while taking prescription Narcotics:  · Limit your activities  · Do not drive and operate hazardous machinery  · Do not make important personal or business decisions  · Do  not drink alcoholic beverages  · If you have not urinated within 8 hours after discharge, please contact your surgeon on call. Report the following to your surgeon:  · Excessive pain, swelling, redness or odor of or around the surgical area  · Temperature over 100.5  · Nausea and vomiting lasting longer than 4 hours or if unable to take medications  · Any signs of decreased circulation or nerve impairment to extremity: change in color, persistent  numbness, tingling, coldness or increase pain  · Any questions    What to do at Home:  Recommended activity: Activity as tolerated, diabetic diet as tolerated. *  Please give a list of your current medications to your Primary Care Provider.     *  Please update this list whenever your medications are discontinued, doses are      changed, or new medications (including over-the-counter products) are added. *  Please carry medication information at all times in case of emergency situations. These are general instructions for a healthy lifestyle:    No smoking/ No tobacco products/ Avoid exposure to second hand smoke  Surgeon General's Warning:  Quitting smoking now greatly reduces serious risk to your health. Obesity, smoking, and sedentary lifestyle greatly increases your risk for illness    A healthy diet, regular physical exercise & weight monitoring are important for maintaining a healthy lifestyle    You may be retaining fluid if you have a history of heart failure or if you experience any of the following symptoms:  Weight gain of 3 pounds or more overnight or 5 pounds in a week, increased swelling in our hands or feet or shortness of breath while lying flat in bed. Please call your doctor as soon as you notice any of these symptoms; do not wait until your next office visit. Recognize signs and symptoms of STROKE:    F-face looks uneven    A-arms unable to move or move unevenly    S-speech slurred or non-existent    T-time-call 911 as soon as signs and symptoms begin-DO NOT go       Back to bed or wait to see if you get better-TIME IS BRAIN. Warning Signs of HEART ATTACK     Call 911 if you have these symptoms:   Chest discomfort. Most heart attacks involve discomfort in the center of the chest that lasts more than a few minutes, or that goes away and comes back. It can feel like uncomfortable pressure, squeezing, fullness, or pain.  Discomfort in other areas of the upper body. Symptoms can include pain or discomfort in one or both arms, the back, neck, jaw, or stomach.  Shortness of breath with or without chest discomfort.  Other signs may include breaking out in a cold sweat, nausea, or lightheadedness. Don't wait more than five minutes to call 911 - MINUTES MATTER! Fast action can save your life.  Calling 911 is almost always the fastest way to get lifesaving treatment. Emergency Medical Services staff can begin treatment when they arrive -- up to an hour sooner than if someone gets to the hospital by car. The discharge information has been reviewed with the patient. The patient verbalized understanding. Discharge medications reviewed with the patient and appropriate educational materials and side effects teaching were provided.   ___________________________________________________________________________________________________________________________________ [id number]

## 2024-05-22 NOTE — PROGRESS NOTES
In accordance with Medicare guidelines, a copy of the Important Letter from Medicare was presented to the patient/family in anticipation of expected discharge within 48 hours. One copy was given to the patient, and a signed copy was placed in the patients chart.   notified Left message for Grecia to give us a call back.

## (undated) DEVICE — YANKAUER,BULB TIP,W/O VENT,RIGID,STERILE: Brand: MEDLINE

## (undated) DEVICE — NEEDLE SYR 18GA L1.5IN RED PLAS HUB S STL BLNT FILL W/O

## (undated) DEVICE — LUBE JELLY FOIL PACK 1.4 OZ: Brand: MEDLINE INDUSTRIES, INC.

## (undated) DEVICE — SINGLE PORT MANIFOLD: Brand: NEPTUNE 2

## (undated) DEVICE — MULTIPLE BAND LIGATOR: Brand: SPEEDBAND SUPERVIEW SUPER 7

## (undated) DEVICE — SYRINGE MED 3ML CLR PLAS STD N CTRL LUERLOCK TIP DISP

## (undated) DEVICE — BLOCK BITE AD 60FR W/ VELC STRP ADDRESSES MOST PT AND

## (undated) DEVICE — AIRLIFE™ OXYGEN TUBING 7 FEET (2.1 M) CRUSH RESISTANT OXYGEN TUBING, VINYL TIPPED: Brand: AIRLIFE™

## (undated) DEVICE — GAUZE,SPONGE,4"X4",12PLY,WOVEN,NS,LF: Brand: MEDLINE

## (undated) DEVICE — CANNULA NSL ORAL AD FOR CAPNOFLEX CO2 O2 AIRLFE

## (undated) DEVICE — SYRINGE MED 10ML LUERLOCK TIP W/O SFTY DISP

## (undated) DEVICE — CONNECTOR TBNG OD5-7MM O2 END DISP

## (undated) DEVICE — KENDALL RADIOLUCENT FOAM MONITORING ELECTRODE RECTANGULAR SHAPE: Brand: KENDALL